# Patient Record
Sex: FEMALE | Employment: UNEMPLOYED | URBAN - METROPOLITAN AREA
[De-identification: names, ages, dates, MRNs, and addresses within clinical notes are randomized per-mention and may not be internally consistent; named-entity substitution may affect disease eponyms.]

---

## 2021-04-01 PROBLEM — F32.9 MAJOR DEPRESSION, SINGLE EPISODE: Status: ACTIVE | Noted: 2021-04-01

## 2021-04-02 ENCOUNTER — HOSPITAL ENCOUNTER (INPATIENT)
Age: 61
LOS: 7 days | Discharge: HOME | DRG: 885 | End: 2021-04-09
Attending: PSYCHIATRY & NEUROLOGY | Admitting: PSYCHIATRY & NEUROLOGY
Payer: COMMERCIAL

## 2021-04-02 PROBLEM — F33.3 SEVERE RECURRENT MAJOR DEPRESSION WITH PSYCHOTIC FEATURES (HCC): Status: ACTIVE | Noted: 2021-04-02

## 2021-04-02 PROCEDURE — 99223 1ST HOSP IP/OBS HIGH 75: CPT | Performed by: PSYCHIATRY & NEUROLOGY

## 2021-04-02 PROCEDURE — 6370000000 HC RX 637 (ALT 250 FOR IP): Performed by: PSYCHIATRY & NEUROLOGY

## 2021-04-02 PROCEDURE — 1240000000 HC EMOTIONAL WELLNESS R&B

## 2021-04-02 RX ORDER — OMEPRAZOLE 20 MG/1
20 CAPSULE, DELAYED RELEASE ORAL DAILY
Status: DISCONTINUED | OUTPATIENT
Start: 2021-04-02 | End: 2021-04-09 | Stop reason: HOSPADM

## 2021-04-02 RX ORDER — TRAZODONE HYDROCHLORIDE 50 MG/1
50 TABLET ORAL NIGHTLY PRN
Status: DISCONTINUED | OUTPATIENT
Start: 2021-04-02 | End: 2021-04-09 | Stop reason: HOSPADM

## 2021-04-02 RX ORDER — POLYETHYLENE GLYCOL 3350 17 G/17G
17 POWDER, FOR SOLUTION ORAL DAILY PRN
Status: DISCONTINUED | OUTPATIENT
Start: 2021-04-02 | End: 2021-04-09 | Stop reason: HOSPADM

## 2021-04-02 RX ORDER — CLONIDINE HYDROCHLORIDE 0.1 MG/1
0.1 TABLET ORAL NIGHTLY
Status: DISCONTINUED | OUTPATIENT
Start: 2021-04-02 | End: 2021-04-09 | Stop reason: HOSPADM

## 2021-04-02 RX ORDER — MAGNESIUM HYDROXIDE/ALUMINUM HYDROXICE/SIMETHICONE 120; 1200; 1200 MG/30ML; MG/30ML; MG/30ML
30 SUSPENSION ORAL EVERY 6 HOURS PRN
Status: DISCONTINUED | OUTPATIENT
Start: 2021-04-02 | End: 2021-04-09 | Stop reason: HOSPADM

## 2021-04-02 RX ORDER — BUSPIRONE HYDROCHLORIDE 15 MG/1
15 TABLET ORAL 2 TIMES DAILY
Status: DISCONTINUED | OUTPATIENT
Start: 2021-04-02 | End: 2021-04-09 | Stop reason: HOSPADM

## 2021-04-02 RX ORDER — ATORVASTATIN CALCIUM 20 MG/1
20 TABLET, FILM COATED ORAL DAILY
Status: DISCONTINUED | OUTPATIENT
Start: 2021-04-02 | End: 2021-04-09 | Stop reason: HOSPADM

## 2021-04-02 RX ORDER — HYDROXYZINE 50 MG/1
50 TABLET, FILM COATED ORAL 3 TIMES DAILY PRN
Status: DISCONTINUED | OUTPATIENT
Start: 2021-04-02 | End: 2021-04-09 | Stop reason: HOSPADM

## 2021-04-02 RX ORDER — OMEPRAZOLE 10 MG/1
20 CAPSULE, DELAYED RELEASE ORAL DAILY
Status: ON HOLD | COMMUNITY
End: 2021-04-08 | Stop reason: HOSPADM

## 2021-04-02 RX ORDER — ACETAMINOPHEN 325 MG/1
650 TABLET ORAL EVERY 4 HOURS PRN
Status: DISCONTINUED | OUTPATIENT
Start: 2021-04-02 | End: 2021-04-09 | Stop reason: HOSPADM

## 2021-04-02 RX ORDER — LITHIUM CARBONATE 300 MG/1
300 TABLET, FILM COATED, EXTENDED RELEASE ORAL DAILY
Status: DISCONTINUED | OUTPATIENT
Start: 2021-04-02 | End: 2021-04-09 | Stop reason: HOSPADM

## 2021-04-02 RX ORDER — BUSPIRONE HYDROCHLORIDE 15 MG/1
15 TABLET ORAL 2 TIMES DAILY
Status: ON HOLD | COMMUNITY
Start: 2021-03-05 | End: 2021-04-08 | Stop reason: SDUPTHER

## 2021-04-02 RX ORDER — SERTRALINE HYDROCHLORIDE 100 MG/1
150 TABLET, FILM COATED ORAL DAILY
Status: ON HOLD | COMMUNITY
Start: 2020-11-27 | End: 2021-04-08 | Stop reason: HOSPADM

## 2021-04-02 RX ORDER — ATORVASTATIN CALCIUM 10 MG/1
20 TABLET, FILM COATED ORAL DAILY
Status: ON HOLD | COMMUNITY
End: 2021-04-08 | Stop reason: SDUPTHER

## 2021-04-02 RX ORDER — IBUPROFEN 400 MG/1
400 TABLET ORAL EVERY 6 HOURS PRN
Status: DISCONTINUED | OUTPATIENT
Start: 2021-04-02 | End: 2021-04-09 | Stop reason: HOSPADM

## 2021-04-02 RX ORDER — ZOLPIDEM TARTRATE 10 MG/1
10 TABLET ORAL NIGHTLY PRN
Status: ON HOLD | COMMUNITY
End: 2021-04-02

## 2021-04-02 RX ADMIN — IBUPROFEN 400 MG: 400 TABLET, FILM COATED ORAL at 21:00

## 2021-04-02 RX ADMIN — BUSPIRONE HYDROCHLORIDE 15 MG: 15 TABLET ORAL at 20:58

## 2021-04-02 RX ADMIN — OMEPRAZOLE 20 MG: 20 CAPSULE, DELAYED RELEASE ORAL at 16:32

## 2021-04-02 RX ADMIN — LITHIUM CARBONATE 300 MG: 300 TABLET, FILM COATED, EXTENDED RELEASE ORAL at 18:33

## 2021-04-02 RX ADMIN — HYDROXYZINE HYDROCHLORIDE 50 MG: 50 TABLET, FILM COATED ORAL at 01:51

## 2021-04-02 RX ADMIN — CLONIDINE HYDROCHLORIDE 0.1 MG: 0.1 TABLET ORAL at 20:59

## 2021-04-02 RX ADMIN — HYDROXYZINE HYDROCHLORIDE 50 MG: 50 TABLET, FILM COATED ORAL at 21:00

## 2021-04-02 RX ADMIN — ATORVASTATIN CALCIUM 20 MG: 20 TABLET, FILM COATED ORAL at 16:32

## 2021-04-02 RX ADMIN — SERTRALINE HYDROCHLORIDE 150 MG: 100 TABLET ORAL at 16:32

## 2021-04-02 ASSESSMENT — SLEEP AND FATIGUE QUESTIONNAIRES
DIFFICULTY ARISING: NO
RESTFUL SLEEP: NO
DIFFICULTY STAYING ASLEEP: YES
RESTFUL SLEEP: NO
DO YOU USE A SLEEP AID: NO
AVERAGE NUMBER OF SLEEP HOURS: 6
DIFFICULTY ARISING: NO
DIFFICULTY STAYING ASLEEP: NO
SLEEP PATTERN: RESTLESSNESS
DIFFICULTY FALLING ASLEEP: YES
DO YOU HAVE DIFFICULTY SLEEPING: NO

## 2021-04-02 ASSESSMENT — PATIENT HEALTH QUESTIONNAIRE - PHQ9: SUM OF ALL RESPONSES TO PHQ QUESTIONS 1-9: 14

## 2021-04-02 NOTE — PROGRESS NOTES
Pharmacy Medication History Note      List of current medications patient is taking is complete. Source of information: [facility] [phone]    Changes made to medication list:  Medications removed (include reason, ex. therapy complete or physician discontinued, noncompliance):  Zolpidem (Ambien)- list clean up    Medications added/doses adjusted:  Atorvastatin (Lipitor) 20 mg daily  Omeprazole (Prilosec) 20 mg daily    Please let me know if you have any questions about this encounter. Thank you!     Electronically signed by Rupesh Navarrete on 4/2/2021 at 10:57 AM

## 2021-04-02 NOTE — PLAN OF CARE
Problem: Altered Mood, Depressive Behavior:  Goal: Ability to disclose and discuss suicidal ideas will improve  Description: Ability to disclose and discuss suicidal ideas will improve  Outcome: Ongoing  Note: Patient denies suicidal ideation at this time. Problem: Altered Mood, Depressive Behavior:  Goal: Absence of self-harm  Description: Absence of self-harm  Outcome: Ongoing  Note: Patient remains free from self-harm.

## 2021-04-02 NOTE — GROUP NOTE
Group Therapy Note    Date: 4/2/2021    Group Start Time: 1330  Group End Time: 1410  Group Topic: Psychoeducation    MARCOS Roberson, DANGELOS    Group Therapy Note    Attendees: 9    Patient's Goal:  Pt will demonstrate improved interpersonal skills    Notes:  Pt attended group and participated. Status After Intervention:  Unchanged    Participation Level:  Active Listener    Participation Quality: Appropriate, Attentive and Resistant      Speech:  normal      Thought Process/Content: Logical      Affective Functioning: Constricted/Restricted      Mood: anxious      Level of consciousness:  Alert and Inattentive      Response to Learning: Able to verbalize current knowledge/experience and Resistant      Endings: None Reported    Modes of Intervention: Education, Support, Socialization, Exploration and Activity      Discipline Responsible: Psychoeducational Specialist      Signature:  Mackenzie Arita

## 2021-04-02 NOTE — PLAN OF CARE
[facility]  Initial Interdisciplinary Treatment Plan NO      Original treatment plan Date & Time: 4/2/21    Admission Type:  Admission Type: Involuntary    Reason for admission:   Reason for Admission: Increased life stressors, Suicidal ideations related to financial problems with her . Estimated Length of Stay:  5-7days  Estimated Discharge Date: to be determined by physician    PATIENT STRENGTHS:  Patient Strengths:Strengths: Communication, Connection to output provider  Patient Strengths and Limitations:Limitations: Difficulty problem solving/relies on others to help solve problems, Hopeless about future  Addictive Behavior: Addictive Behavior  In the past 3 months, have you felt or has someone told you that you have a problem with:  : None  Do you have a history of Chemical Use?: No  Do you have a history of Alcohol Use?: No  Do you have a history of Street Drug Abuse?: No  Histroy of Prescripton Drug Abuse?: No  Medical Problems:History reviewed. No pertinent past medical history.   Status EXAM:Status and Exam  Normal: No  Facial Expression: Sad  Affect: Appropriate  Level of Consciousness: Alert  Mood:Normal: No  Mood: Depressed, Anxious, Sad, Terrified  Motor Activity:Normal: Yes  Interview Behavior: Cooperative  Preception: [place] to Person, Alanda Marten to Time, [place] to Place, [place] to Situation  Attention:Normal: No  Attention: Distractible, Unable to Concentrate  Thought Processes: Other(See comment)(WDL)  Thought Content:Normal: Yes  Hallucinations: None  Delusions: No  Memory:Normal: Yes  Insight and Judgment: No  Insight and Judgment: Poor Judgment  Present Suicidal Ideation: No  Present Homicidal Ideation: No    EDUCATION:   Learner Progress Toward Treatment Goals: reviewed group plans and strategies for care    Method:group therapy, medication compliance, individualized assessments and care planning    Outcome: needs reinforcement    PATIENT GOALS: to be discussed with patient within 72 hours    PLAN/TREATMENT RECOMMENDATIONS:     continue group therapy , medications compliance, goal setting, individualized assessments and care, continue to monitor pt on unit      SHORT-TERM GOALS:   Time frame for Short-Term Goals: 5-7 days    LONG-TERM GOALS:  Time frame for Long-Term Goals: 6 months  Members Present in Team Meeting: [facility]

## 2021-04-02 NOTE — BH NOTE
`[facility]  Admission Note     Admission Type:   Admission Type: Involuntary    Reason for admission:  Reason for Admission: Increased life stressors, Suicidal ideations related to financial problems with her . PATIENT STRENGTHS:  Strengths: Communication, Connection to output provider    Patient Strengths and Limitations:  Limitations: Difficulty problem solving/relies on others to help solve problems, Hopeless about future    Addictive Behavior:   Addictive Behavior  In the past 3 months, have you felt or has someone told you that you have a problem with:  : None  Do you have a history of Chemical Use?: No  Do you have a history of Alcohol Use?: No  Do you have a history of Street Drug Abuse?: No  Histroy of Prescripton Drug Abuse?: No    Medical Problems:   History reviewed. No pertinent past medical history.     Status EXAM:  Status and Exam  Normal: No  Facial Expression: Sad  Affect: Appropriate  Level of Consciousness: Alert  Mood:Normal: No  Mood: Depressed, Anxious, Sad, Terrified  Motor Activity:Normal: Yes  Interview Behavior: Cooperative  Preception: [place] to Person, Karen Putty to Time, [place] to Place, [place] to Situation  Attention:Normal: No  Attention: Distractible, Unable to Concentrate  Thought Processes: Other(See comment)(WDL)  Thought Content:Normal: Yes  Hallucinations: None  Delusions: No  Memory:Normal: Yes  Insight and Judgment: No  Insight and Judgment: Poor Judgment  Present Suicidal Ideation: No  Present Homicidal Ideation: No    Tobacco Screening:  Practical Counseling, on admission, cale X, if applicable and completed (first 3 are required if patient doesn't refuse):            ( )  Recognizing danger situations (included triggers and roadblocks)                    ( )  Coping skills (new ways to manage stress, exercise, relaxation techniques, changing routine, distraction)                                                           ( )  Basic information about quitting (benefits of quitting, techniques in how to quit, available resources  ( ) Referral for counseling faxed to Jessica                                           ( x) Patient refused counseling  ( ) Patient has not smoked in the last 30 days    Metabolic Screening:    No results found for: LABA1C    No results found for: CHOL  No results found for: TRIG  No results found for: HDL  No components found for: LDLCAL  No results found for: LABVLDL      Body mass index is 28.08 kg/m². BP Readings from Last 2 Encounters:   21 115/74           Pt admitted with followings belongings:  Dentures: None  Vision - Corrective Lenses: Glasses  Hearing Aid: None  Jewelry: None  Body Piercings Removed: N/A  Clothing: Footwear, Pants, Shirt, Socks, Undergarments (Comment), Pajamas  Were All Patient Medications Collected?: Not Applicable  Other Valuables: None     Valuables placed in safe in security envelope, number:  N/a. Patient's home medications need verified. Patient oriented to surroundings and program expectations and copy of patient rights given. Received admission packet:  no.  Consents reviewed, signed no. Refused yes. Patient verbalize understanding:  yes. Patient education on precautions: yes      Pt admitted from [address] for suicidal thoughts. Pt states she is really depressed and stressed over financial issues. Pt's  lost his job. Pt states her dog  recently as well. Pt denies drugs or alcohol use. Pt wanded and changed into hospital attire for safety. Pt sees Olya Barr out patient for therapy and Rosalina Anthony prescribes her medications.                    Martita Finn RN

## 2021-04-02 NOTE — CARE COORDINATION
BHI Biopsychosocial Assessment    Current Level of Psychosocial Functioning     Independent x  Dependent    Minimal Assist     Comments:    Psychosocial High Risk Factors (check all that apply)    Unable to obtain meds   Chronic illness/pain    Substance abuse   Lack of Family Support   Financial stress xx  Isolation xx  Inadequate Community Resources  Suicide attempt(s)  Not taking medications   Victim of crime   Developmental Delay  Unable to manage personal needs    Age 74 or older   Homeless  No transportation   Readmission within 30 days  Unemployment  Traumatic Event    Comments:   Psychiatric Advanced Directives: pt denies     Family to Involve in Treatment: pt reports her  and her adult children are supportive     Sexual Orientation:  N/A    Patient Strengths: pt has stable housing, supportive family, is linked with outpatient provider     Patient Barriers: pt reports her  lost long-term job 1 year ago and since the loss, the family has been struggling financially       Opiate Education Provided:  Pt denies       [facility]/mental health history: Pt is linked with Jorge [facility]as  in [place], [place]; Maria Eugenia John NP for medications     Plan of Care   medication management, group/individual therapies, family meetings, psycho -education, treatment team meetings to assist with stabilization    Initial Discharge Plan:  Pt to return home at discharge and f/up with Bella Garcia in [address] Summary:  Danette Winkler is a 64year old single female who has been admitted to [place] [address] after being brought to emergency department by her  and adult daughter with goal of mental health support as patient reports she has been having increased anxiety and suicidal ideation over the last 2 weeks. Pt reports history of outpatient mental health treatment with psychiatrist and therapist for treatment of anxiety disorder, denies history of inpatient admission.  Pt reports increase in excessive and constant worry, castastrophizing, feelings of panic. Pt reports disrupted sleep, feelings of restlessness and hopelessness. Pt reports history of anxiety since her teens, states increase in the last 1 year due to loss of job by  and financial struggle and application for bankruptcy. Pt reports her  of 36 years and 4 adult children are supportive. Pt reports she has attempted to work outside the home but is not able due to chronic feelings of panic and worry, pt reports she has applied for social security disability income but has been denied. Pt denies history of suicide attempts. Pt denies legal issues. Pt denies AOD history/issues. SW offered instruction on mindfulness techniques for coping/relaxation. Pt did attend psychotherapy group following assessment and participated appropriately. SW offered ongoing support, encouragement.

## 2021-04-02 NOTE — BH NOTE
Patient given tobacco quitline number 07832865015 at this time, refusing to call at this time, states \" I just dont want to quit now\"- patient given information as to the dangers of long term tobacco use. Continue to reinforce the importance of tobacco cessation.

## 2021-04-02 NOTE — H&P
Department of Psychiatry  Attending Physician Psychiatric Assessment     Reason for Admission to Psychiatric Unit:  Threat to self requiring 24 hour professional observation  Concerns about patient's safety in the community    CHIEF COMPLAINT:  Depression with suicidal ideation    History obtained from:  Patient and electronic medical record     HISTORY OF PRESENT ILLNESS:    Junito Pavon is a 64 y.o. female with significant past medical history of GERD and hyperlipidemia who was brought to the ER but her  and adult daughter for suicidal thoughts. Patient states she had a plan to stab herself with a knife but told her daughter. Patient reports that there has been a significant amount of stressors that have led to this including filling bankruptcy and \"having to be responsible. \"   Patient reports that she has been feeling down and depressed all day, everyday for a period of time longer than 2 weeks. She reports poor sleep despite the use of Ambien. She states she sleeps with a CPAP machine. She endorses significant anhedonia, decreased concentration, decreased energy, and feelings of hopeless and helplessness. She reports that her appetite is poor. She endorses feelings of guilt that she lacks so much energy and states that she just feels \"weak. \"  Patient has chronic passive suicidal thoughts. She states, \"I'm angry with God because he won't just let me die naturally. \" She states that she wants to die but she is \"too scared\" to do anything. Patient denies a time in her past where she has had grandiose thoughts of herself, gone 5 days or more without sleep, and made impulsive decisions. Patient endorses auditory hallucinations of doors opening and closing and of music but states this only happens when she is feeling down and depressed. She also reports she sometimes sees shadows when she is down and depressed. She endorses anxiety about anything and everything.   She reports she is often on edge and Wt 174 lb (78.9 kg)   BMI 28.08 kg/m²      Review of Systems   Constitutional: Negative for chills and weight loss. HENT: Negative for ear pain and nosebleeds. Eyes: Negative for blurred vision and photophobia. Respiratory: Negative for cough, shortness of breath and wheezing. Cardiovascular: Negative for chest pain and palpitations. Gastrointestinal: Negative for abdominal pain, diarrhea and vomiting. Genitourinary: Negative for dysuria and urgency. Musculoskeletal: Negative for falls and joint pain. Skin: Negative for itching and rash. Neurological: Negative for tremors, seizures and weakness. Endo/Heme/Allergies: Does not bruise/bleed easily. Physical Exam:      Constitutional:  Appears well-developed and well-nourished, no acute distress  HENT:   Head: Normocephalic and atraumatic. Eyes: Conjunctivae are normal. Right eye exhibits no discharge. Left eye exhibits no discharge. No scleral icterus. Neck: Normal range of motion. Neck supple. Pulmonary/Chest:  No respiratory distress or accessory muscle use, no wheezing. Abdominal: Soft. Exhibits no distension. Musculoskeletal: Normal range of motion. Exhibits no edema. Neurological: cranial nerves II-XII grossly in tact, normal gait and station  Skin: Skin is warm and dry. Patient is not diaphoretic. No erythema.          Mental Status Examination:    Level of consciousness:  within normal limits   Appearance:  Hospital attire, seated on the side of bed, fair grooming   Behavior/Motor: Tremerous  Attitude toward examiner:  Cooperative  Speech: normal rate and volume  Mood:  Depressed, anxious  Affect:  Mood-congruent, tearful  Thought processes:  Goal directed, linear  Thought content: active suicidal ideations without current plan or intent               Denies homicidal ideations               Endorses mood-congruent hallucinations              denies delusions  Cognition:  Oriented to self, location, time,
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
## 2021-04-02 NOTE — GROUP NOTE
Group Therapy Note    Date: 4/2/2021    Group Start Time: 1000  Group End Time: 3946  Group Topic: Psychotherapy    [facility] [address] DIANE Russo        Group Therapy Note    Attendees: 3/10         Patient's Goal:  Expression of feeling     Notes:  Therapeutic worksheet provided     Status After Intervention:  Unchanged    Participation Level:  Active Listener and Interactive    Participation Quality: Appropriate and Attentive      Speech:  normal      Thought Process/Content: Logical      Affective Functioning: Congruent      Mood: anxious      Level of consciousness:  Alert and Oriented x4      Response to Learning: Able to verbalize current knowledge/experience and Able to verbalize/acknowledge new learning      Endings: None Reported    Modes of Intervention: Education and Support      Discipline Responsible: /Counselor      Signature:  DIANE Moon

## 2021-04-02 NOTE — GROUP NOTE
Group Therapy Note    Date: 4/2/2021    Group Start Time: 1600  Group End Time: 36  Group Topic: Healthy Living/Wellness    MARCOS BHIDALMIS CARRASCO    Aneta Mackay RN        Group Therapy Note    Attendees: 10/18         Patient's Goal:   Increase intrapersonal skills, develop new coping skills    Notes:  Progressing to goal.    Status After Intervention:  Improved    Participation Level:  Active Listener    Participation Quality: Appropriate and Resistant      Speech:  normal      Thought Process/Content: Logical      Affective Functioning: Congruent      Mood: calm      Level of consciousness:  Alert and Oriented x4      Response to Learning: Progressing to goal      Endings: None Reported    Modes of Intervention: Education, Support, Socialization, Exploration and Problem-solving      Discipline Responsible: Registered Nurse      Signature:  Aneta Mackay RN

## 2021-04-03 PROCEDURE — 99232 SBSQ HOSP IP/OBS MODERATE 35: CPT | Performed by: NURSE PRACTITIONER

## 2021-04-03 PROCEDURE — 6370000000 HC RX 637 (ALT 250 FOR IP): Performed by: PSYCHIATRY & NEUROLOGY

## 2021-04-03 PROCEDURE — 1240000000 HC EMOTIONAL WELLNESS R&B

## 2021-04-03 RX ADMIN — OMEPRAZOLE 20 MG: 20 CAPSULE, DELAYED RELEASE ORAL at 12:02

## 2021-04-03 RX ADMIN — HYDROXYZINE HYDROCHLORIDE 50 MG: 50 TABLET, FILM COATED ORAL at 21:35

## 2021-04-03 RX ADMIN — BUSPIRONE HYDROCHLORIDE 15 MG: 15 TABLET ORAL at 12:02

## 2021-04-03 RX ADMIN — SERTRALINE HYDROCHLORIDE 150 MG: 100 TABLET ORAL at 12:02

## 2021-04-03 RX ADMIN — ATORVASTATIN CALCIUM 20 MG: 20 TABLET, FILM COATED ORAL at 12:02

## 2021-04-03 RX ADMIN — TRAZODONE HYDROCHLORIDE 50 MG: 50 TABLET ORAL at 21:35

## 2021-04-03 RX ADMIN — LITHIUM CARBONATE 300 MG: 300 TABLET, FILM COATED, EXTENDED RELEASE ORAL at 12:02

## 2021-04-03 RX ADMIN — BUSPIRONE HYDROCHLORIDE 15 MG: 15 TABLET ORAL at 21:35

## 2021-04-03 NOTE — GROUP NOTE
Group Therapy Note    Date: 4/3/2021    Group Start Time: 1100  Group End Time: 1130  Group Topic: Healthy Living/Wellness    [address], RN        Group Therapy Note    Attendees: 11/19         Patient refused to attend Wellness group at 1100 after encouragement from staff. 1:1 talk time offered as alternative to group session.       Signature:  Gaby Mcintyre RN

## 2021-04-03 NOTE — GROUP NOTE
Group Therapy Note    Date: 4/3/2021    Group Start Time: 1330  Group End Time: 2867  Group Topic: Cognitive Skills    MARCOS Jose    Patient declined to attend social skills group at 1330 despite encouragement from staff. 1:1 talk time offered by staff as alternative to group session.         Signature:  Becca Jose

## 2021-04-03 NOTE — PLAN OF CARE
[facility]  Day 3 Interdisciplinary Treatment Plan NOTE    Review Date & Time: 4/3/2021 1313    Admission Type:   Admission Type: Involuntary    Reason for admission:  Reason for Admission: Increased life stressors, Suicidal ideations related to financial problems with her . Estimated Length of Stay: 5-7 days  Estimated Discharge Date Update: to be determined by physician    PATIENT STRENGTHS:  Patient Strengths Strengths: Communication, Positive Support  Patient Strengths and Limitations:Limitations: Tendency to isolate self, Multiple barriers to leisure interests, General negative or hopeless attitude about future/recovery  Addictive Behavior:Addictive Behavior  In the past 3 months, have you felt or has someone told you that you have a problem with:  : None  Do you have a history of Chemical Use?: No  Do you have a history of Alcohol Use?: No  Do you have a history of Street Drug Abuse?: No  Histroy of Prescripton Drug Abuse?: No  Medical Problems:History reviewed. No pertinent past medical history.     Risk:  Fall RiskTotal: 55  Jeovn Scale Jevon Scale Score: 22  BVC Total: 0  Change in scores no Changes to plan of Care no    Status EXAM:   Status and Exam  Normal: No  Facial Expression: Flat, Sad  Affect: Appropriate  Level of Consciousness: Alert  Mood:Normal: No  Mood: Depressed, Sad  Motor Activity:Normal: No  Motor Activity: Decreased  Interview Behavior: Cooperative  Preception: [place] to Person, [place]abeline Keyes to Time, [place] to Place, [place] to Situation  Attention:Normal: Yes  Attention: Distractible  Thought Processes: Circumstantial  Thought Content:Normal: Yes  Hallucinations: None  Delusions: No  Memory:Normal: Yes  Insight and Judgment: Yes  Insight and Judgment: Poor Judgment  Present Suicidal Ideation: No  Present Homicidal Ideation: No    Daily Assessment Last Entry:   Daily Sleep (WDL): Within Defined Limits         Patient Currently in Pain: Denies  Daily Nutrition (WDL): Signature Sheet    Anjel Bachelor

## 2021-04-03 NOTE — GROUP NOTE
Group Therapy Note    Date: 4/3/2021    Group Start Time: 0900  Group End Time: 0920  Group Topic: Community Meeting    [address] [facility] [address]    Patient declined to attend community meeting/goal setting group at 0900 despite encouragement from staff. 1:1 talk time offered by staff as alternative to group session.       Signature:  Andrew Conteh

## 2021-04-03 NOTE — PLAN OF CARE
Problem: Altered Mood, Depressive Behavior:  Goal: Ability to disclose and discuss suicidal ideas will improve  Description: Ability to disclose and discuss suicidal ideas will improve  4/3/2021 0534 by Cora Hagan LPN  Outcome: Ongoing  Note: Patient denies suicidal ideation at this time. Problem: Altered Mood, Depressive Behavior:  Goal: Absence of self-harm  Description: Absence of self-harm  4/3/2021 0534 by Cora Hagan LPN  Outcome: Ongoing  Patient remains free from self harm. Patient agrees to seek staff if thoughts arise. 15 minute checks maintained for patient safety. Will continue to monitor and provide support and reassurance as needed.

## 2021-04-03 NOTE — GROUP NOTE
Group Therapy Note    Date: 4/3/2021    Group Start Time: 1000  Group End Time: 1050  Group Topic: Psychoeducation    MARCOS CARRASCO    DOTTIE Bhatia LSW        Group Therapy Note    Attendees: 9         Patient's Goal:  Patient will demonstrate increased interpersonal communication skills. Notes:  Patient participated appropriately in 10 am Psychoeducational group. Group topic was Boundaries setting. Status After Intervention:  Improved    Participation Level:  Active Listener    Participation Quality: Appropriate      Speech:  normal      Thought Process/Content: Logical      Affective Functioning: Congruent      Mood: euthymic      Level of consciousness:  Alert and Oriented x4      Response to Learning: Able to verbalize current knowledge/experience and Able to verbalize/acknowledge new learning      Endings: None Reported    Modes of Intervention: Education, Socialization and Problem-solving      Discipline Responsible: /Counselor      Signature:  DOTTIE Bhatia LSW

## 2021-04-03 NOTE — PROGRESS NOTES
Daily Progress Note  Humza Antunez, CNP  4/3/2021      CHIEF COMPLAINT: Depression with suicidal ideation    Reviewed patient's current plan of care and vital signs with nursing staff. Sleep:  a few hours last night  Attending groups: No, isolating to room    SUBJECTIVE:    Staff reports the patient remains medication compliant, they report that she remains mostly isolative to her room. She is using as needed Atarax on occasion for anxiety. She did not take trazodone last night to help with sleep, she reports that the clonidine was somewhat helpful in letting her obtain \"deeper\" sleep but she continues to endorse early awakening. We did discuss that she could try the trazodone tonight to see if it helps lengthen the amount of sleep she receives. She reports that she only had one nightmare that she recalls and denies any negative side effects from the clonidine which is new to her medication regimen. She continues to endorse significant depression, she is quite tearful throughout our entire interview. She reports that she feels guilty and feels like she is a burden on her family. She states that she is frustrated because she has \"no reason to feel the way that I do\". She states that she tries to enjoy life with her family and her grandchildren who she describes as \"wonderful\" but reports that she is still so sad and down. She endorses significant lack of motivation, she reports that she wants to work so that she can help out financially but she is just unable to do it. She states that she is scared because she has never, \"this close\" to wanting to kill herself. She states that she has often had thoughts that life is not worth living and had hoped that she would naturally die but describes this as \"really scary\". She reports frustration that medications have not worked, she states that the best she ever felt on medication was Wellbutrin and she had an allergic reaction to it.   She reports that she has had a

## 2021-04-03 NOTE — PLAN OF CARE
Problem: Altered Mood, Depressive Behavior:  Goal: Absence of self-harm  Description: Absence of self-harm  4/3/2021 1115 by Selwyn Nazario LPN  Outcome: Ongoing   Patient remains free from self harm.     Problem: Altered Mood, Depressive Behavior:  Goal: Ability to disclose and discuss suicidal ideas will improve  Description: Ability to disclose and discuss suicidal ideas will improve  4/3/2021 1115 by Selwyn Nazario LPN  Outcome: Ongoing   Patient denies suicidal ideations

## 2021-04-04 PROCEDURE — 6370000000 HC RX 637 (ALT 250 FOR IP): Performed by: PSYCHIATRY & NEUROLOGY

## 2021-04-04 PROCEDURE — 1240000000 HC EMOTIONAL WELLNESS R&B

## 2021-04-04 PROCEDURE — 99232 SBSQ HOSP IP/OBS MODERATE 35: CPT | Performed by: NURSE PRACTITIONER

## 2021-04-04 RX ADMIN — OMEPRAZOLE 20 MG: 20 CAPSULE, DELAYED RELEASE ORAL at 08:02

## 2021-04-04 RX ADMIN — BUSPIRONE HYDROCHLORIDE 15 MG: 15 TABLET ORAL at 20:46

## 2021-04-04 RX ADMIN — LITHIUM CARBONATE 300 MG: 300 TABLET, FILM COATED, EXTENDED RELEASE ORAL at 08:02

## 2021-04-04 RX ADMIN — ATORVASTATIN CALCIUM 20 MG: 20 TABLET, FILM COATED ORAL at 08:02

## 2021-04-04 RX ADMIN — HYDROXYZINE HYDROCHLORIDE 50 MG: 50 TABLET, FILM COATED ORAL at 20:46

## 2021-04-04 RX ADMIN — TRAZODONE HYDROCHLORIDE 50 MG: 50 TABLET ORAL at 20:46

## 2021-04-04 RX ADMIN — CLONIDINE HYDROCHLORIDE 0.1 MG: 0.1 TABLET ORAL at 20:46

## 2021-04-04 RX ADMIN — BUSPIRONE HYDROCHLORIDE 15 MG: 15 TABLET ORAL at 08:02

## 2021-04-04 RX ADMIN — SERTRALINE HYDROCHLORIDE 150 MG: 100 TABLET ORAL at 08:02

## 2021-04-04 NOTE — PROGRESS NOTES
Daily Progress Note  Lonnie Rey CNP  4/4/2021      CHIEF COMPLAINT: Depression with suicidal ideation    Reviewed patient's current plan of care and vital signs with nursing staff. Sleep: Slept better, continues to have early awakening  Attending groups: Yes, selectively    SUBJECTIVE:    Staff reports medication compliance, no overnight events. Rodríguez Modi was social with peers today and active in rec therapy this afternoon. She reports that she has been trying to come out of her room more to pass the time. She reports that she is \"forcing herself\" to engage to get her mind off of things. She remains depressed and continues to endorse a lot of tearfulness. She reports that she thought about our interview yesterday and states that she has in the past experienced dissociation where she feels \"outside of my body looking in\". She denies feeling that at present. She denies auditory or visual hallucinations. She continues to endorse suicidal ideation, she reports that she now wishes that God would take her more than having active intent or plan to harm herself. Rodríguez Modi reports that she spoke with her family last night regarding ECT, her family encouraged her to consider it. We did watch the video today and discussed at length the procedure itself, requirements for inpatient versus outpatient as well as potential side effects. She reports some concern that she would have to remain in the hospital for all treatments. We discussed that it is possible for her to transition to outpatient ECT once we have established enough stability of symptoms that she is safe and no longer suicidal.  She would like to engage in further discussion tomorrow with Dr. Brown Boards, we will move forward with consulting internal medicine for medical clearance in addition to prior authorization with her insurance. She remains somewhat reluctant though feels that she is at a point where she cannot control her depressive symptoms herself.   She repeats on multiple occasions \"I cannot believe I let things get this bad\". Agatha Schwartz is right-handed, she reports history of surgical procedure and denies any problems with anesthesia. Mental Status Exam  Level of consciousness:  Awake and alert  Appearance: Personal attire, seated on chair, with good grooming and hygiene   Behavior/Motor: Pleasant upon approach, less tearful today  Attitude toward examiner:  Cooperative, attentive, good eye contact  Speech:  spontaneous, normal rate, normal volume and well articulated  Mood: Depressed  Affect: Mood congruent, anxious-began shaking legs repeatedly while watching ECT video  Thought processes:  linear, goal directed and coherent  Thought content:  denies homicidal ideation  Suicidal Ideation: Endorses active suicidal ideation, verbally contracts for safety  Delusions:  no evidence of delusions  Perceptual Disturbance: Endorses improving auditory hallucinations  Cognition:  Oriented to person, place, time/date and situation  Memory: age appropriate  Insight & Judgement: Fair, improving  Medication side effects:  denies       Data   height is 5' 6\" (1.676 m) and weight is 174 lb (78.9 kg). Her oral temperature is 97.8 °F (36.6 °C). Her blood pressure is 92/68 and her pulse is 84. Her respiration is 14. Labs:   No results found for any previous visit.             Medications  Current Facility-Administered Medications: acetaminophen (TYLENOL) tablet 650 mg, 650 mg, Oral, Q4H PRN  aluminum & magnesium hydroxide-simethicone (MAALOX) 200-200-20 MG/5ML suspension 30 mL, 30 mL, Oral, Q6H PRN  hydrOXYzine (ATARAX) tablet 50 mg, 50 mg, Oral, TID PRN  ibuprofen (ADVIL;MOTRIN) tablet 400 mg, 400 mg, Oral, Q6H PRN  polyethylene glycol (GLYCOLAX) packet 17 g, 17 g, Oral, Daily PRN  traZODone (DESYREL) tablet 50 mg, 50 mg, Oral, Nightly PRN  nicotine polacrilex (NICORETTE) gum 2 mg, 2 mg, Oral, PRN  atorvastatin (LIPITOR) tablet 20 mg, 20 mg, Oral, Daily  busPIRone (BUSPAR) tablet 15 mg, 15 mg, Oral, BID  omeprazole (PRILOSEC) delayed release capsule 20 mg, 20 mg, Oral, Daily  sertraline (ZOLOFT) tablet 150 mg, 150 mg, Oral, Daily  cloNIDine (CATAPRES) tablet 0.1 mg, 0.1 mg, Oral, Nightly  lithium (LITHOBID) extended release tablet 300 mg, 300 mg, Oral, Daily    ASSESSMENT  Severe recurrent major depression with psychotic features (HCC)     PLAN  Patients symptoms show some improvement  Continue current medication regimen  Monitor need and frequency of as needed medication  Encourage participation in groups and milieu  Will prior auth for ECT, discussed with Dr. Lesly Ford and likely have a family meeting prior to moving forward with treatment  Consult internal medicine for medical clearance for ECT  Attempt to develop insight  Psycho-education conducted. Supportive Therapy conducted. Probable discharge is undetermined at this time  Follow-up daily while in the inpatient unit    Electronically signed by BLADIMIR Christensen CNP on 4/3/21 at 4:11 PM EDT    **This report has been created using voice recognition software. It may contain minor errors which are inherent in voice recognition technology. **

## 2021-04-04 NOTE — PLAN OF CARE
Problem: Altered Mood, Depressive Behavior:  Goal: Absence of self-harm  Description: Absence of self-harm  4/4/2021 0938 by Michelle Olmos LPN  Outcome: Ongoing   Patient remains free from self harm. Problem: Altered Mood, Depressive Behavior:  Goal: Ability to disclose and discuss suicidal ideas will improve  Description: Ability to disclose and discuss suicidal ideas will improve  4/4/2021 0938 by Michelle Olmos LPN  Outcome: Ongoing    Patient denies suicidal ideations.

## 2021-04-04 NOTE — PLAN OF CARE
Problem: Altered Mood, Depressive Behavior:  Goal: Absence of self-harm  Description: Absence of self-harm  4/3/2021 2136 by Alvaro Weaver LPN  Outcome: Ongoing     Problem: Altered Mood, Depressive Behavior:  Goal: Ability to disclose and discuss suicidal ideas will improve  Description: Ability to disclose and discuss suicidal ideas will improve  4/3/2021 2136 by Alvaro Weaver LPN  Outcome: Ongoing     Patient denies all thoughts or ideations to harm self. Patient states her anxiety and depression is a 4/10 but is improving. Patient is medication and behavorial compliant. Patient is provided with safe environment. Patient encouraged to seek nursing staff if thoughts of self harm arise, patient verbalized understanding. Will continue to monitor Q15 minute safety checks.

## 2021-04-04 NOTE — GROUP NOTE
Group Therapy Note    Date: 4/4/2021    Group Start Time: 1000  Group End Time: 1100  Group Topic: Psychoeducation    MAROCS CARRASCO    DOTTIE Pierson, DIANE        Group Therapy Note    Attendees: 12         Patient's Goal:  Pt will demonstrate increased interpersonal interaction. Notes:  Group topic was Strengths.      Status After Intervention:  Improved    Participation Level: Interactive    Participation Quality: Appropriate      Speech:  normal      Thought Process/Content: Logical      Affective Functioning: Congruent      Mood: anxious      Level of consciousness:  Alert      Response to Learning: Progressing to goal      Endings: None Reported    Modes of Intervention: Education      Discipline Responsible: /Counselor      Signature:  DOTTIE Pierson, DIANE

## 2021-04-04 NOTE — GROUP NOTE
Group Therapy Note    Date: 4/4/2021    Group Start Time: 1600  Group End Time: 5355  Group Topic: Healthy Living/Wellness    [address], RN        Group Therapy Note    Attendees: 11/19           Patient refused to attend Wellness group at 1600 after encouragement from staff. 1:1 talk time offered as alternative to group session.       Signature:  Kandice Paz RN

## 2021-04-04 NOTE — GROUP NOTE
Group Therapy Note    Date: 4/4/2021    Group Start Time: 0900  Group End Time: 1740  Group Topic: Community Meeting    [facility]    [place], [place]        Group Therapy Note    Attendees: 13/21         Patient's Goal:  To increase interpersonal interaction. Notes:  Pt attended and participated in group. Status After Intervention:  Improved    Participation Level:  Active Listener and Interactive    Participation Quality: Appropriate, Attentive and Sharing      Speech:  normal      Thought Process/Content: Logical      Affective Functioning: Congruent      Mood: euthymic      Level of consciousness:  Alert and Attentive      Response to Learning: Able to verbalize current knowledge/experience, Able to retain information and Progressing to goal      Endings: None Reported    Modes of Intervention: Education, Support, Socialization, Clarifying and Reality-testing      Discipline Responsible: Psychoeducational Specialist      Signature:  Russ Villa

## 2021-04-05 ENCOUNTER — APPOINTMENT (OUTPATIENT)
Dept: GENERAL RADIOLOGY | Age: 61
DRG: 885 | End: 2021-04-05
Attending: PSYCHIATRY & NEUROLOGY
Payer: COMMERCIAL

## 2021-04-05 LAB
ABSOLUTE EOS #: 0.1 K/UL (ref 0–0.4)
ABSOLUTE IMMATURE GRANULOCYTE: NORMAL K/UL (ref 0–0.3)
ABSOLUTE LYMPH #: 2 K/UL (ref 1–4.8)
ABSOLUTE MONO #: 0.4 K/UL (ref 0.1–1.3)
ALBUMIN SERPL-MCNC: 4.3 G/DL (ref 3.5–5.2)
ALBUMIN/GLOBULIN RATIO: ABNORMAL (ref 1–2.5)
ALP BLD-CCNC: 58 U/L (ref 35–104)
ALT SERPL-CCNC: 15 U/L (ref 5–33)
ANION GAP SERPL CALCULATED.3IONS-SCNC: 10 MMOL/L (ref 9–17)
ANION GAP SERPL CALCULATED.3IONS-SCNC: 7 MMOL/L (ref 9–17)
AST SERPL-CCNC: 11 U/L
BASOPHILS # BLD: 1 % (ref 0–2)
BASOPHILS ABSOLUTE: 0.1 K/UL (ref 0–0.2)
BILIRUB SERPL-MCNC: 0.29 MG/DL (ref 0.3–1.2)
BUN BLDV-MCNC: 14 MG/DL (ref 6–20)
BUN BLDV-MCNC: 15 MG/DL (ref 6–20)
BUN/CREAT BLD: ABNORMAL (ref 9–20)
BUN/CREAT BLD: ABNORMAL (ref 9–20)
CALCIUM SERPL-MCNC: 9 MG/DL (ref 8.6–10.4)
CALCIUM SERPL-MCNC: 9.2 MG/DL (ref 8.6–10.4)
CHLORIDE BLD-SCNC: 103 MMOL/L (ref 98–107)
CHLORIDE BLD-SCNC: 105 MMOL/L (ref 98–107)
CO2: 27 MMOL/L (ref 20–31)
CO2: 27 MMOL/L (ref 20–31)
CREAT SERPL-MCNC: 1.05 MG/DL (ref 0.5–0.9)
CREAT SERPL-MCNC: 1.07 MG/DL (ref 0.5–0.9)
DIFFERENTIAL TYPE: NORMAL
EOSINOPHILS RELATIVE PERCENT: 2 % (ref 0–4)
GFR AFRICAN AMERICAN: >60 ML/MIN
GFR AFRICAN AMERICAN: >60 ML/MIN
GFR NON-AFRICAN AMERICAN: 53 ML/MIN
GFR NON-AFRICAN AMERICAN: 54 ML/MIN
GFR SERPL CREATININE-BSD FRML MDRD: ABNORMAL ML/MIN/{1.73_M2}
GLUCOSE BLD-MCNC: 103 MG/DL (ref 70–99)
GLUCOSE BLD-MCNC: 74 MG/DL (ref 70–99)
HCT VFR BLD CALC: 41.6 % (ref 36–46)
HEMOGLOBIN: 13.9 G/DL (ref 12–16)
IMMATURE GRANULOCYTES: NORMAL %
LYMPHOCYTES # BLD: 34 % (ref 24–44)
MCH RBC QN AUTO: 30.9 PG (ref 26–34)
MCHC RBC AUTO-ENTMCNC: 33.4 G/DL (ref 31–37)
MCV RBC AUTO: 92.3 FL (ref 80–100)
MONOCYTES # BLD: 7 % (ref 1–7)
NRBC AUTOMATED: NORMAL PER 100 WBC
PDW BLD-RTO: 12.9 % (ref 11.5–14.9)
PLATELET # BLD: 283 K/UL (ref 150–450)
PLATELET ESTIMATE: NORMAL
PMV BLD AUTO: 7.4 FL (ref 6–12)
POTASSIUM SERPL-SCNC: 4.3 MMOL/L (ref 3.7–5.3)
POTASSIUM SERPL-SCNC: 5.1 MMOL/L (ref 3.7–5.3)
RBC # BLD: 4.51 M/UL (ref 4–5.2)
RBC # BLD: NORMAL 10*6/UL
SEG NEUTROPHILS: 56 % (ref 36–66)
SEGMENTED NEUTROPHILS ABSOLUTE COUNT: 3.3 K/UL (ref 1.3–9.1)
SODIUM BLD-SCNC: 139 MMOL/L (ref 135–144)
SODIUM BLD-SCNC: 140 MMOL/L (ref 135–144)
TOTAL PROTEIN: 6.4 G/DL (ref 6.4–8.3)
WBC # BLD: 5.9 K/UL (ref 3.5–11)
WBC # BLD: NORMAL 10*3/UL

## 2021-04-05 PROCEDURE — 6370000000 HC RX 637 (ALT 250 FOR IP): Performed by: INTERNAL MEDICINE

## 2021-04-05 PROCEDURE — 99232 SBSQ HOSP IP/OBS MODERATE 35: CPT | Performed by: PSYCHIATRY & NEUROLOGY

## 2021-04-05 PROCEDURE — 80053 COMPREHEN METABOLIC PANEL: CPT

## 2021-04-05 PROCEDURE — 6370000000 HC RX 637 (ALT 250 FOR IP): Performed by: PSYCHIATRY & NEUROLOGY

## 2021-04-05 PROCEDURE — 99222 1ST HOSP IP/OBS MODERATE 55: CPT | Performed by: INTERNAL MEDICINE

## 2021-04-05 PROCEDURE — 36415 COLL VENOUS BLD VENIPUNCTURE: CPT

## 2021-04-05 PROCEDURE — 71045 X-RAY EXAM CHEST 1 VIEW: CPT

## 2021-04-05 PROCEDURE — 1240000000 HC EMOTIONAL WELLNESS R&B

## 2021-04-05 PROCEDURE — 80048 BASIC METABOLIC PNL TOTAL CA: CPT

## 2021-04-05 PROCEDURE — 90833 PSYTX W PT W E/M 30 MIN: CPT | Performed by: PSYCHIATRY & NEUROLOGY

## 2021-04-05 PROCEDURE — 85025 COMPLETE CBC W/AUTO DIFF WBC: CPT

## 2021-04-05 RX ORDER — SODIUM POLYSTYRENE SULFONATE 4.1 MEQ/G
15 POWDER, FOR SUSPENSION ORAL; RECTAL ONCE
Status: COMPLETED | OUTPATIENT
Start: 2021-04-05 | End: 2021-04-05

## 2021-04-05 RX ADMIN — SERTRALINE HYDROCHLORIDE 150 MG: 100 TABLET ORAL at 08:28

## 2021-04-05 RX ADMIN — BUSPIRONE HYDROCHLORIDE 15 MG: 15 TABLET ORAL at 08:29

## 2021-04-05 RX ADMIN — SODIUM POLYSTYRENE SULFONATE 15 G: 1 POWDER ORAL; RECTAL at 10:46

## 2021-04-05 RX ADMIN — ATORVASTATIN CALCIUM 20 MG: 20 TABLET, FILM COATED ORAL at 08:29

## 2021-04-05 RX ADMIN — BUSPIRONE HYDROCHLORIDE 15 MG: 15 TABLET ORAL at 20:40

## 2021-04-05 RX ADMIN — CLONIDINE HYDROCHLORIDE 0.1 MG: 0.1 TABLET ORAL at 20:40

## 2021-04-05 RX ADMIN — OMEPRAZOLE 20 MG: 20 CAPSULE, DELAYED RELEASE ORAL at 08:29

## 2021-04-05 RX ADMIN — LITHIUM CARBONATE 300 MG: 300 TABLET, FILM COATED, EXTENDED RELEASE ORAL at 08:29

## 2021-04-05 RX ADMIN — TRAZODONE HYDROCHLORIDE 50 MG: 50 TABLET ORAL at 20:40

## 2021-04-05 NOTE — FLOWSHEET NOTE
*Patient participated in the Spirituality Group       04/05/21 1514   Encounter Summary   Services provided to: Patient   Referral/Consult From: Rounding   Continue Visiting   (4/5/21)   Complexity of Encounter Moderate   Length of Encounter 30 minutes   Spiritual/[facility]   Type Spiritual support   Assessment Calm; Approachable   Intervention Active listening   Outcome Receptive;Engaged in conversation

## 2021-04-05 NOTE — GROUP NOTE
Group Therapy Note    Date: 4/5/2021    Group Start Time: 1005  Group End Time: 9647  Group Topic: Psychoeducation    DANGELO RocaS    Group Therapy Note    Attendees: 8    Patient's Goal:  Pt will identify ways to advocate for mental health and identify ways to seek help when needed. Notes:  Pt attended group and participated    Status After Intervention:  Improved    Participation Level:  Active Listener and Interactive    Participation Quality: Appropriate, Attentive, Sharing and Supportive      Speech:  normal      Thought Process/Content: Logical  Linear      Affective Functioning: Congruent      Mood: euthymic      Level of consciousness:  Alert, Oriented x4 and Attentive      Response to Learning: Able to verbalize current knowledge/experience, Able to verbalize/acknowledge new learning, Able to retain information, Capable of insight, Able to change behavior and Progressing to goal      Endings: None Reported    Modes of Intervention: Education, Support, Socialization, Exploration and Problem-solving      Discipline Responsible: Psychoeducational Specialist      Signature:  Richard Rousseau [place]

## 2021-04-05 NOTE — GROUP NOTE
Group Therapy Note    Date: 4/4/2021    Group Start Time: 2000  Group End Time: 2025  Group Topic: Wrap-Up    [facility]    Allison Buckley RN        Group Therapy Note    Attendees: 14         Patient's Goal:  To relax and get ready for bed    Notes:  Patient was an active listener and participant    Status After Intervention:  Unchanged    Participation Level:  Active Listener    Participation Quality: Appropriate, Attentive, Sharing and Supportive      Speech:  normal      Thought Process/Content: Logical      Affective Functioning: Congruent      Mood: anxious and depressed      Level of consciousness:  Alert, Oriented x4 and Attentive      Response to Learning: Able to verbalize current knowledge/experience, Able to verbalize/acknowledge new learning, Able to retain information, Capable of insight and Able to change behavior, progressing towards goal      Endings: None Reported    Modes of Intervention: Education, Support, Socialization, Activity and Media      Discipline Responsible: Registered Nurse      Signature:  Allison Buckley RN

## 2021-04-05 NOTE — PLAN OF CARE
Patient has been out in the milieu social with peers. Patient states she has been eating and sleeping okay. Patient was compliant with scheduled medications this evening. Patient denies any suicidal thoughts at this time. Patient agrees to come talk with staff if having any thoughts to harm herself this shift. 15 min rounds continued for patient safety.    Problem: Altered Mood, Depressive Behavior:  Goal: Ability to disclose and discuss suicidal ideas will improve  Description: Ability to disclose and discuss suicidal ideas will improve  Outcome: Ongoing  Goal: Absence of self-harm  Description: Absence of self-harm  Outcome: Ongoing     Problem: Tobacco Use:  Goal: Inpatient tobacco use cessation counseling participation  Description: Inpatient tobacco use cessation counseling participation  Outcome: Ongoing

## 2021-04-05 NOTE — CONSULTS
[facility] Internal Medicine    CONSULTATION / HISTORY AND PHYSICAL EXAMINATION            Date:   4/5/2021  Patient name:  Yuliya Herzog  Date of admission:  4/2/2021 12:54 AM  MRN:   [id number]  Account:  [de-identified]  YOB: 1960  PCP:    No primary care provider on file. Room:   [address] [place] [address]  Code Status:    Full Code    Physician Requesting Consult: Wojciech Milner MD    Reason for Consult:  medical management    Chief Complaint:     No chief complaint on file. Hyperkalemia    History Obtained From:     Patient medical record nursing staff    History of Present Illness:     61-year-old lady with a history of depression anxiety for over 40 years  History of hyperlipidemia on Lipitor GERD on Prilosec  HLD  Onset more than 5 years ago  Severity is mild, not getting worse  Not associated with pancreatitis  Tolerating statin well no muscle pain        Past Medical History:     History reviewed. No pertinent past medical history. Past Surgical History:     History reviewed. No pertinent surgical history. Medications Prior to Admission:     Prior to Admission medications    Medication Sig Start Date End Date Taking? Authorizing Provider   atorvastatin (LIPITOR) 10 MG tablet Take 20 mg by mouth daily    Yes Historical Provider, MD   busPIRone (BUSPAR) 15 MG tablet Take 15 mg by mouth 2 times daily 3/5/21  Yes Historical Provider, MD   omeprazole (PRILOSEC) 10 MG delayed release capsule Take 20 mg by mouth daily    Yes Historical Provider, MD   sertraline (ZOLOFT) 100 MG tablet Take 150 mg by mouth daily 11/27/20  Yes Historical Provider, MD        Allergies:     Bupropion    Social History:     Tobacco:    reports that she has quit smoking. She has never used smokeless tobacco.  Alcohol:      has no history on file for alcohol. Drug Use:  has no history on file for drug. Family History:     History reviewed. No pertinent family history.     Review of Systems: Positive and Negative as described in HPI. CONSTITUTIONAL:  negative for fevers, chills, sweats, fatigue, weight loss  HEENT:  negative for vision, hearing changes, runny nose, throat pain  RESPIRATORY:  negative for shortness of breath, cough, congestion, wheezing. CARDIOVASCULAR:  negative for chest pain, palpitations. GASTROINTESTINAL:  negative for nausea, vomiting, diarrhea, constipation, change in bowel habits, abdominal pain   GENITOURINARY:  negative for difficulty of urination, burning with urination, frequency   INTEGUMENT:  negative for rash, skin lesions, easy bruising   HEMATOLOGIC/LYMPHATIC:  negative for swelling/edema   ALLERGIC/IMMUNOLOGIC:  negative for urticaria , itching  ENDOCRINE:  negative increase in drinking, increase in urination, hot or cold intolerance  MUSCULOSKELETAL:  negative joint pains, muscle aches, swelling of joints  NEUROLOGICAL:  negative for headaches, dizziness, lightheadedness, numbness, pain, tingling extremities       Physical Exam:     BP 96/62   Pulse 76   Temp 98 °F (36.7 °C) (Oral)   Resp 14   Ht 5' 6\" (1.676 m)   Wt 174 lb (78.9 kg)   BMI 28.08 kg/m²   Temp (24hrs), Av.2 °F (36.8 °C), Min:98 °F (36.7 °C), Max:98.4 °F (36.9 °C)    No results for input(s): POCGLU in the last 72 hours. No intake or output data in the 24 hours ending 21 1713    General Appearance:  alert, well appearing, and in no acute distress  Mental status: oriented to person, place, and time with normal affect  Head:  normocephalic, atraumatic.   Eye: no icterus, redness, pupils equal and reactive, extraocular eye movements intact, conjunctiva clear  Ear: normal external ear, no discharge, hearing intact  Nose:  no drainage noted  Mouth: mucous membranes moist  Neck: supple, no carotid bruits, thyroid not palpable  Lungs: Bilateral equal air entry, clear to ausculation, no wheezing, rales or rhonchi, normal effort  Cardiovascular: normal rate, regular rhythm, no murmur, gallop, rub.   Abdomen: Soft, nontender, nondistended, normal bowel sounds, no hepatomegaly or splenomegaly  Neurologic: There are no new focal motor or sensory deficits, normal muscle tone and bulk, no abnormal sensation, normal speech, cranial nerves II through XII grossly intact  Skin: No gross lesions, rashes, bruising or bleeding on exposed skin area  Extremities:  peripheral pulses palpable, no pedal edema or calf pain with palpation      Investigations:      Laboratory Testing:  Recent Results (from the past 24 hour(s))   EKG 12 Lead    Collection Time: 04/04/21  5:18 PM   Result Value Ref Range    Ventricular Rate 64 BPM    Atrial Rate 64 BPM    P-R Interval 144 ms    QRS Duration 104 ms    Q-T Interval 418 ms    QTc Calculation (Bazett) 431 ms    P Axis 28 degrees    R Axis 27 degrees    T Axis 27 degrees   Comp Metabolic Prof    Collection Time: 04/05/21  6:46 AM   Result Value Ref Range    Glucose 103 (H) 70 - 99 mg/dL    BUN 15 6 - 20 mg/dL    CREATININE 1.07 (H) 0.50 - 0.90 mg/dL    Bun/Cre Ratio NOT REPORTED 9 - 20    Calcium 9.2 8.6 - 10.4 mg/dL    Sodium 139 135 - 144 mmol/L    Potassium 5.1 3.7 - 5.3 mmol/L    Chloride 105 98 - 107 mmol/L    CO2 27 20 - 31 mmol/L    Anion Gap 7 (L) 9 - 17 mmol/L    Alkaline Phosphatase 58 35 - 104 U/L    ALT 15 5 - 33 U/L    AST 11 <32 U/L    Total Bilirubin 0.29 (L) 0.3 - 1.2 mg/dL    Total Protein 6.4 6.4 - 8.3 g/dL    Albumin 4.3 3.5 - 5.2 g/dL    Albumin/Globulin Ratio NOT REPORTED 1.0 - 2.5    GFR Non-African American 53 (L) >60 mL/min    GFR African American >60 >60 mL/min    GFR Comment          GFR Staging NOT REPORTED    CBC with DIFF    Collection Time: 04/05/21  6:46 AM   Result Value Ref Range    WBC 5.9 3.5 - 11.0 k/uL    RBC 4.51 4.0 - 5.2 m/uL    Hemoglobin 13.9 12.0 - 16.0 g/dL    Hematocrit 41.6 36 - 46 %    MCV 92.3 80 - 100 fL    MCH 30.9 26 - 34 pg    MCHC 33.4 31 - 37 g/dL    RDW 12.9 11.5 - 14.9 %    Platelets 166 011 - 923 k/uL    MPV 7.4 6.0 -

## 2021-04-05 NOTE — GROUP NOTE
Group Therapy Note    Date: 4/5/2021    Group Start Time: 0900  Group End Time: 8280  Group Topic: Community Meeting    [address] [place], [place]        Group Therapy Note    Attendees: 8/21           Patient's Goal: To set daily goals and discuss a safe discharge plan      Status After Intervention:  Improved    Participation Level:  Active Listener and Interactive    Participation Quality: Appropriate, Attentive, Sharing and Supportive      Speech:  normal      Thought Process/Content: Logical      Affective Functioning: Congruent      Mood: euphoric      Level of consciousness:  Alert, Oriented x4 and Attentive      Response to Learning: Able to verbalize current knowledge/experience, Able to verbalize/acknowledge new learning, Able to retain information and Capable of insight    Modes of Intervention: Education, Support, Socialization, Exploration, Clarifying and Problem-solving      Discipline Responsible: Psychoeducational Specialist      Signature:  Ara Patino

## 2021-04-05 NOTE — PROGRESS NOTES
Daily Progress Note  4/5/2021        CHIEF COMPLAINT: Depression with suicidal ideation    Reviewed patient's current plan of care and vital signs with nursing staff. Sleep: Patient reports restless sleep approximating 5+ hours last night  Attending groups: Yes, selectively    SUBJECTIVE:    Staff report patient has maintained medication adherence and has been without behavioral change in the last 24 hours. She did utilize as needed trazodone and hydroxyzine at 2046 with some effect. Patient shares that she is not accustomed to such a flat mattress and had multiple wakings during the night, and needed to use the bathroom. Additionally she shares that she has some anxiety regarding the decision to move forward with ECT and is looking forward to discussed with attending physician later in the day. Patient did speak to her  and is less concerned related to the wellbeing of her pets at home. She reports her mood today as \"hopeful \". She continues to endorse suicidal ideation but feels safe in current acute care setting. She denies auditory or visual hallucinations or side effects related to her current medication regimen.   She denies having questions or concerns related to current plan of care      Mental Status Exam  Level of consciousness:  Awake and alert  Appearance: Personal attire, seated on chair, with good grooming and hygiene   Behavior/Motor: Pleasant, no psychomotor abnormalities noted  Attitude toward examiner:  Cooperative, attentive, good eye contact  Speech:  spontaneous, normal rate, normal volume and well articulated  Mood: Depressed  Affect: Blunted  Thought processes:  linear, goal directed and coherent  Thought content:  denies homicidal ideation  Suicidal Ideation: Endorses active suicidal ideation, verbally contracts for safety  Delusions:  no evidence of delusions  Perceptual Disturbance: Endorses improving auditory hallucinations  Cognition:  Oriented to person, place, time/date and situation  Memory: age appropriate  Insight & Judgement: improving  Medication side effects:  denies       Data   height is 5' 6\" (1.676 m) and weight is 174 lb (78.9 kg). Her oral temperature is 98 °F (36.7 °C). Her blood pressure is 96/62 and her pulse is 76. Her respiration is 14.    Labs:   Admission on 04/02/2021   Component Date Value Ref Range Status    Ventricular Rate 04/04/2021 64  BPM Preliminary    Atrial Rate 04/04/2021 64  BPM Preliminary    P-R Interval 04/04/2021 144  ms Preliminary    QRS Duration 04/04/2021 104  ms Preliminary    Q-T Interval 04/04/2021 418  ms Preliminary    QTc Calculation (Bazett) 04/04/2021 431  ms Preliminary    P Axis 04/04/2021 28  degrees Preliminary    R Axis 04/04/2021 27  degrees Preliminary    T Axis 04/04/2021 27  degrees Preliminary    Glucose 04/05/2021 103* 70 - 99 mg/dL Final    BUN 04/05/2021 15  6 - 20 mg/dL Final    CREATININE 04/05/2021 1.07* 0.50 - 0.90 mg/dL Final    Bun/Cre Ratio 04/05/2021 NOT REPORTED  9 - 20 Final    Calcium 04/05/2021 9.2  8.6 - 10.4 mg/dL Final    Sodium 04/05/2021 139  135 - 144 mmol/L Final    Potassium 04/05/2021 5.1  3.7 - 5.3 mmol/L Final    Chloride 04/05/2021 105  98 - 107 mmol/L Final    CO2 04/05/2021 27  20 - 31 mmol/L Final    Anion Gap 04/05/2021 7* 9 - 17 mmol/L Final    Alkaline Phosphatase 04/05/2021 58  35 - 104 U/L Final    ALT 04/05/2021 15  5 - 33 U/L Final    AST 04/05/2021 11  <32 U/L Final    Total Bilirubin 04/05/2021 0.29* 0.3 - 1.2 mg/dL Final    Total Protein 04/05/2021 6.4  6.4 - 8.3 g/dL Final    Albumin 04/05/2021 4.3  3.5 - 5.2 g/dL Final    Albumin/Globulin Ratio 04/05/2021 NOT REPORTED  1.0 - 2.5 Final    GFR Non- 04/05/2021 53* >60 mL/min Final    GFR  04/05/2021 >60  >60 mL/min Final    GFR Comment 04/05/2021        Final    Comment: Average GFR for 52-63 years old:   80 mL/min/1.73sq m  Chronic Kidney Disease:   <60 mL/min/1.73sq m  Kidney failure:   <15 mL/min/1.73sq m              eGFR calculated using average adult body mass.  Additional eGFR calculator available at:        [email or web address]            GFR Staging 04/05/2021 NOT REPORTED   Final    WBC 04/05/2021 5.9  3.5 - 11.0 k/uL Final    RBC 04/05/2021 4.51  4.0 - 5.2 m/uL Final    Hemoglobin 04/05/2021 13.9  12.0 - 16.0 g/dL Final    Hematocrit 04/05/2021 41.6  36 - 46 % Final    MCV 04/05/2021 92.3  80 - 100 fL Final    MCH 04/05/2021 30.9  26 - 34 pg Final    MCHC 04/05/2021 33.4  31 - 37 g/dL Final    RDW 04/05/2021 12.9  11.5 - 14.9 % Final    Platelets 68/36/2033 283  150 - 450 k/uL Final    MPV 04/05/2021 7.4  6.0 - 12.0 fL Final    NRBC Automated 04/05/2021 NOT REPORTED  per 100 WBC Final    Differential Type 04/05/2021 NOT REPORTED   Final    Seg Neutrophils 04/05/2021 56  36 - 66 % Final    Lymphocytes 04/05/2021 34  24 - 44 % Final    Monocytes 04/05/2021 7  1 - 7 % Final    Eosinophils % 04/05/2021 2  0 - 4 % Final    Basophils 04/05/2021 1  0 - 2 % Final    Immature Granulocytes 04/05/2021 NOT REPORTED  0 % Final    Segs Absolute 04/05/2021 3.30  1.3 - 9.1 k/uL Final    Absolute Lymph # 04/05/2021 2.00  1.0 - 4.8 k/uL Final    Absolute Mono # 04/05/2021 0.40  0.1 - 1.3 k/uL Final    Absolute Eos # 04/05/2021 0.10  0.0 - 0.4 k/uL Final    Basophils Absolute 04/05/2021 0.10  0.0 - 0.2 k/uL Final    Absolute Immature Granulocyte 04/05/2021 NOT REPORTED  0.00 - 0.30 k/uL Final    WBC Morphology 04/05/2021 NOT REPORTED   Final    RBC Morphology 04/05/2021 NOT REPORTED   Final    Platelet Estimate 28/90/1913 NOT REPORTED   Final            Medications  Current Facility-Administered Medications: acetaminophen (TYLENOL) tablet 650 mg, 650 mg, Oral, Q4H PRN  aluminum & magnesium hydroxide-simethicone (MAALOX) 200-200-20 MG/5ML suspension 30 mL, 30 mL, Oral, Q6H PRN  hydrOXYzine (ATARAX) tablet 50 mg, 50 mg, Oral, TID PRN  ibuprofen (ADVIL;MOTRIN) tablet 400 mg, 400 mg, Oral, Q6H PRN  polyethylene glycol (GLYCOLAX) packet 17 g, 17 g, Oral, Daily PRN  traZODone (DESYREL) tablet 50 mg, 50 mg, Oral, Nightly PRN  nicotine polacrilex (NICORETTE) gum 2 mg, 2 mg, Oral, PRN  atorvastatin (LIPITOR) tablet 20 mg, 20 mg, Oral, Daily  busPIRone (BUSPAR) tablet 15 mg, 15 mg, Oral, BID  omeprazole (PRILOSEC) delayed release capsule 20 mg, 20 mg, Oral, Daily  sertraline (ZOLOFT) tablet 150 mg, 150 mg, Oral, Daily  cloNIDine (CATAPRES) tablet 0.1 mg, 0.1 mg, Oral, Nightly  lithium (LITHOBID) extended release tablet 300 mg, 300 mg, Oral, Daily    ASSESSMENT  Severe recurrent major depression with psychotic features (HCC)     PLAN  Discussed with Dr. Grace Steinberg and treatment team   Patients symptoms show some improvement  Continue current medication regimen  Monitor need and frequency of as needed medication  Encourage participation in groups and milieu  Moving forward with ECT after discussion with attending physician  Attempt to develop insight  Psycho-education conducted. Supportive Therapy conducted. Probable discharge is undetermined at this time  Follow-up daily while in the inpatient unit    Electronically signed by Alecia Ganser, APRN - CNP on 4/5/21 at 4:25 PM EDT    **This report has been created using voice recognition software. It may contain minor errors which are inherent in voice recognition technology. **    I independently saw and evaluated the patient. I reviewed the nurse practitioners documentation above. Any additional comments or changes to the nurse practitioners documentation are stated below otherwise agree with assessment. Plan will be as follows:  Spent 30 minutes with the patient. Of that greater than 16 minutes was spent in supportive psychotherapy. Discussed risk benefits and alternatives to treatment regimen. Also discussed risk benefits and alternatives to ECT.   Patient has now contemplated ECT for 3 days and wants to move

## 2021-04-05 NOTE — GROUP NOTE
Group Therapy Note    Date: 4/5/2021    Group Start Time: 8251  Group End Time: 7750  Group Topic: Psychoeducation    BEVERLY Roca    Group Therapy Note    Attendees: 9    Patient's Goal:  Pt will identify benefits of music for coping    Notes:  Pt attended group and participated    Status After Intervention:  Improved    Participation Level:  Active Listener and Interactive    Participation Quality: Appropriate, Attentive, Sharing and Supportive      Speech:  normal      Thought Process/Content: Logical  Linear      Affective Functioning: Congruent      Mood: euthymic      Level of consciousness:  Alert, Oriented x4 and Attentive      Response to Learning: Able to verbalize current knowledge/experience, Able to verbalize/acknowledge new learning, Able to retain information, Capable of insight, Able to change behavior and Progressing to goal      Endings: None Reported    Modes of Intervention: Education, Support, Socialization, Exploration and Media      Discipline Responsible: Psychoeducational Specialist      Signature:  Michael Theodore [place]

## 2021-04-06 DIAGNOSIS — F33.3 SEVERE RECURRENT MAJOR DEPRESSION WITH PSYCHOTIC FEATURES (HCC): Primary | ICD-10-CM

## 2021-04-06 LAB
ANION GAP SERPL CALCULATED.3IONS-SCNC: 10 MMOL/L (ref 9–17)
BUN BLDV-MCNC: 15 MG/DL (ref 6–20)
BUN/CREAT BLD: ABNORMAL (ref 9–20)
CALCIUM SERPL-MCNC: 9.5 MG/DL (ref 8.6–10.4)
CHLORIDE BLD-SCNC: 102 MMOL/L (ref 98–107)
CO2: 27 MMOL/L (ref 20–31)
CREAT SERPL-MCNC: 1.08 MG/DL (ref 0.5–0.9)
GFR AFRICAN AMERICAN: >60 ML/MIN
GFR NON-AFRICAN AMERICAN: 52 ML/MIN
GFR SERPL CREATININE-BSD FRML MDRD: ABNORMAL ML/MIN/{1.73_M2}
GFR SERPL CREATININE-BSD FRML MDRD: ABNORMAL ML/MIN/{1.73_M2}
GLUCOSE BLD-MCNC: 110 MG/DL (ref 70–99)
LITHIUM DATE LAST DOSE: ABNORMAL
LITHIUM DOSE AMOUNT: 300
LITHIUM DOSE TIME: 826
LITHIUM LEVEL: 0.2 MMOL/L (ref 0.6–1.2)
POTASSIUM SERPL-SCNC: 4.6 MMOL/L (ref 3.7–5.3)
SODIUM BLD-SCNC: 139 MMOL/L (ref 135–144)

## 2021-04-06 PROCEDURE — 80178 ASSAY OF LITHIUM: CPT

## 2021-04-06 PROCEDURE — 80048 BASIC METABOLIC PNL TOTAL CA: CPT

## 2021-04-06 PROCEDURE — 36415 COLL VENOUS BLD VENIPUNCTURE: CPT

## 2021-04-06 PROCEDURE — 6370000000 HC RX 637 (ALT 250 FOR IP): Performed by: PSYCHIATRY & NEUROLOGY

## 2021-04-06 PROCEDURE — 99231 SBSQ HOSP IP/OBS SF/LOW 25: CPT | Performed by: INTERNAL MEDICINE

## 2021-04-06 PROCEDURE — 99232 SBSQ HOSP IP/OBS MODERATE 35: CPT | Performed by: PSYCHIATRY & NEUROLOGY

## 2021-04-06 PROCEDURE — 1240000000 HC EMOTIONAL WELLNESS R&B

## 2021-04-06 RX ADMIN — BUSPIRONE HYDROCHLORIDE 15 MG: 15 TABLET ORAL at 08:12

## 2021-04-06 RX ADMIN — OMEPRAZOLE 20 MG: 20 CAPSULE, DELAYED RELEASE ORAL at 08:12

## 2021-04-06 RX ADMIN — CLONIDINE HYDROCHLORIDE 0.1 MG: 0.1 TABLET ORAL at 21:19

## 2021-04-06 RX ADMIN — BUSPIRONE HYDROCHLORIDE 15 MG: 15 TABLET ORAL at 21:19

## 2021-04-06 RX ADMIN — ATORVASTATIN CALCIUM 20 MG: 20 TABLET, FILM COATED ORAL at 08:12

## 2021-04-06 RX ADMIN — SERTRALINE HYDROCHLORIDE 150 MG: 100 TABLET ORAL at 08:12

## 2021-04-06 RX ADMIN — IBUPROFEN 400 MG: 400 TABLET, FILM COATED ORAL at 08:12

## 2021-04-06 RX ADMIN — LITHIUM CARBONATE 300 MG: 300 TABLET, FILM COATED, EXTENDED RELEASE ORAL at 08:12

## 2021-04-06 RX ADMIN — TRAZODONE HYDROCHLORIDE 50 MG: 50 TABLET ORAL at 21:19

## 2021-04-06 ASSESSMENT — PAIN SCALES - GENERAL: PAINLEVEL_OUTOF10: 8

## 2021-04-06 NOTE — GROUP NOTE
Group Therapy Note    Date: 4/6/2021    Group Start Time: 1100  Group End Time: 9862  Group Topic: Group Therapy    DOTTIE Dorado LSW        Group Therapy Note    Attendees: 4         Patient did not attend 1100 psychotherapy group.  1:1 talk time offered, patient refused    Signature:  DOTTIE Soriano LSW

## 2021-04-06 NOTE — GROUP NOTE
Group Therapy Note    Date: 4/6/2021    Group Start Time: 1600  Group End Time: 1700  Group Topic: Group Therapy    MARCOS Helton LPN        Group Therapy Note    Attendees: 8/17         Patient's Goal:  Healthy communication skills      Status After Intervention:  Improved    Participation Level: Interactive    Participation Quality: Appropriate      Speech:  normal      Thought Process/Content: Logical      Affective Functioning: Congruent      Mood: elevated      Level of consciousness:  Alert      Response to Learning: Able to verbalize current knowledge/experience      Endings: None Reported    Modes of Intervention: Activity      Discipline Responsible: Licensed Practical Nurse      Signature:  Yordan Helton LPN

## 2021-04-06 NOTE — GROUP NOTE
Group Therapy Note    Date: 4/6/2021    Group Start Time: 1100  Group End Time: 6642  Group Topic: Recreational    [address], [place]        Group Therapy Note    Attendees: 5         Patient's Goal:  Develop effective communication skills. Status After Intervention:  Improved    Participation Level:  Active Listener and Interactive    Participation Quality: Appropriate, Attentive, Sharing and Supportive      Speech:  normal      Thought Process/Content: Logical      Affective Functioning: Congruent      Mood: euphoric      Level of consciousness:  Alert, Oriented x4 and Attentive      Response to Learning: Able to verbalize current knowledge/experience, Able to verbalize/acknowledge new learning, Able to retain information and Capable of insight    Modes of Intervention: Education, Socialization, Exploration, Clarifying, Problem-solving and Activity      Discipline Responsible: Psychoeducational Specialist      Signature:  Barbra Alvarado

## 2021-04-06 NOTE — CONSULTS
Positive and Negative as described in HPI. CONSTITUTIONAL:  negative for fevers, chills, sweats, fatigue, weight loss  HEENT:  negative for vision, hearing changes, runny nose, throat pain  RESPIRATORY:  negative for shortness of breath, cough, congestion, wheezing. CARDIOVASCULAR:  negative for chest pain, palpitations. GASTROINTESTINAL:  negative for nausea, vomiting, diarrhea, constipation, change in bowel habits, abdominal pain   GENITOURINARY:  negative for difficulty of urination, burning with urination, frequency   INTEGUMENT:  negative for rash, skin lesions, easy bruising   HEMATOLOGIC/LYMPHATIC:  negative for swelling/edema   ALLERGIC/IMMUNOLOGIC:  negative for urticaria , itching  ENDOCRINE:  negative increase in drinking, increase in urination, hot or cold intolerance  MUSCULOSKELETAL:  negative joint pains, muscle aches, swelling of joints  NEUROLOGICAL:  negative for headaches, dizziness, lightheadedness, numbness, pain, tingling extremities       Physical Exam:     BP 99/61   Pulse 109   Temp 98 °F (36.7 °C) (Temporal)   Resp 15   Ht 5' 6\" (1.676 m)   Wt 174 lb (78.9 kg)   BMI 28.08 kg/m²   Temp (24hrs), Av °F (36.7 °C), Min:98 °F (36.7 °C), Max:98 °F (36.7 °C)    No results for input(s): POCGLU in the last 72 hours. No intake or output data in the 24 hours ending 21 1201    General Appearance:  alert, well appearing, and in no acute distress  Mental status: oriented to person, place, and time with normal affect  Head:  normocephalic, atraumatic.   Eye: no icterus, redness, pupils equal and reactive, extraocular eye movements intact, conjunctiva clear  Ear: normal external ear, no discharge, hearing intact  Nose:  no drainage noted  Mouth: mucous membranes moist  Neck: supple, no carotid bruits, thyroid not palpable  Lungs: Bilateral equal air entry, clear to ausculation, no wheezing, rales or rhonchi, normal effort  Cardiovascular: normal rate, regular rhythm, no murmur, gallop, rub.   Abdomen: Soft, nontender, nondistended, normal bowel sounds, no hepatomegaly or splenomegaly  Neurologic: There are no new focal motor or sensory deficits, normal muscle tone and bulk, no abnormal sensation, normal speech, cranial nerves II through XII grossly intact  Skin: No gross lesions, rashes, bruising or bleeding on exposed skin area  Extremities:  peripheral pulses palpable, no pedal edema or calf pain with palpation      Investigations:      Laboratory Testing:  Recent Results (from the past 24 hour(s))   BASIC METABOLIC PANEL    Collection Time: 04/05/21  5:34 PM   Result Value Ref Range    Glucose 74 70 - 99 mg/dL    BUN 14 6 - 20 mg/dL    CREATININE 1.05 (H) 0.50 - 0.90 mg/dL    Bun/Cre Ratio NOT REPORTED 9 - 20    Calcium 9.0 8.6 - 10.4 mg/dL    Sodium 140 135 - 144 mmol/L    Potassium 4.3 3.7 - 5.3 mmol/L    Chloride 103 98 - 107 mmol/L    CO2 27 20 - 31 mmol/L    Anion Gap 10 9 - 17 mmol/L    GFR Non-African American 54 (L) >60 mL/min    GFR African American >60 >60 mL/min    GFR Comment          GFR Staging NOT REPORTED    LITHIUM LEVEL    Collection Time: 04/06/21  7:44 AM   Result Value Ref Range    Lithium Lvl 0.2 (L) 0.6 - 1.2 mmol/L    Lithium Dose Amount 300     Lithium Date Last Dose 40,521     Lithium Dose Time 346    BASIC METABOLIC PANEL    Collection Time: 04/06/21  7:44 AM   Result Value Ref Range    Glucose 110 (H) 70 - 99 mg/dL    BUN 15 6 - 20 mg/dL    CREATININE 1.08 (H) 0.50 - 0.90 mg/dL    Bun/Cre Ratio NOT REPORTED 9 - 20    Calcium 9.5 8.6 - 10.4 mg/dL    Sodium 139 135 - 144 mmol/L    Potassium 4.6 3.7 - 5.3 mmol/L    Chloride 102 98 - 107 mmol/L    CO2 27 20 - 31 mmol/L    Anion Gap 10 9 - 17 mmol/L    GFR Non-African American 52 (L) >60 mL/min    GFR African American >60 >60 mL/min    GFR Comment          GFR Staging NOT REPORTED            Consultations:   IP CONSULT TO INTERNAL MEDICINE  Assessment :      Primary Problem  Severe recurrent major depression with psychotic features [facility])    Active Hospital Problems    Diagnosis Date Noted    Severe recurrent major depression with psychotic features ([facility] [address] [F33.3] 04/02/2021       Plan:   Medical evaluation preop clearance for electroconvulsive therapy    1. Borderline hyperkalemia potassium 5.1  2. Bladder scan rule out obstruction Kayexalate 15 g given this morning  3. Low potassium diet avoid banana and orange juice  4. BMP today  5. Borderline hypotension no dizziness if possible avoid clonidine  6. Low risk for procedure cleared if repeat BMP is normal  apirl 6  Hyperkalemia resolved  junie sign off   Pl call as needed      Tobias Handley MD  4/6/2021  12:01 PM    Copy sent to Dr. Amber Mathew primary care provider on file. Please note that this chart was generated using voice recognition Dragon dictation software. Although every effort was made to ensure the accuracy of this automated transcription, some errors in transcription may have occurred.

## 2021-04-06 NOTE — GROUP NOTE
Group Therapy Note    Date: 4/6/2021    Group Start Time: 0840  Group End Time: 0915  Group Topic: Community Meeting    [address] [place], [place]        Group Therapy Note    Attendees: 11/19         Patient's Goal:  Set daily goals, identify one positive coping mechanism    Status After Intervention:  Improved    Participation Level:  Active Listener and Interactive    Participation Quality: Appropriate, Attentive, Sharing and Supportive      Speech:  normal      Thought Process/Content: Logical      Affective Functioning: Congruent      Mood: euphoric      Level of consciousness:  Alert, Oriented x4 and Attentive      Response to Learning: Able to verbalize current knowledge/experience, Able to verbalize/acknowledge new learning, Able to retain information, Capable of insight and Able to change behavior        Modes of Intervention: Education, Support, Socialization, Exploration, Clarifying, Problem-solving and Activity      Discipline Responsible: Psychoeducational Specialist      Signature:  Trey Billy

## 2021-04-06 NOTE — GROUP NOTE
Group Therapy Note    Date: 4/6/2021    Group Start Time: 1350  Group End Time: 0304  Group Topic: Cognitive Skills    [address], [facility]        Group Therapy Note    Attendees: 9/18               Patient's Goal:  How to identify triggers to anxiety and depression, improve positive coping skills. Status After Intervention:  Improved    Participation Level:  Active Listener and Interactive    Participation Quality: Appropriate, Attentive and Sharing      Speech:  normal      Thought Process/Content: Logical      Affective Functioning: Congruent      Mood: euphoric      Level of consciousness:  Alert, Oriented x4 and Attentive      Response to Learning: Able to verbalize current knowledge/experience, Able to verbalize/acknowledge new learning, Able to retain information and Capable of insight      Modes of Intervention: Education, Support, Socialization, Exploration and Clarifying      Discipline Responsible: Psychoeducational Specialist      Signature:  Terrence Marti

## 2021-04-06 NOTE — PROGRESS NOTES
Daily Progress Note  4/6/2021        CHIEF COMPLAINT: Depression with suicidal ideation    Reviewed patient's current plan of care and vital signs with nursing staff. Sleep: Patient reports restless sleep approximating 8+ hours last night  Attending groups: Yes, selectively    SUBJECTIVE:    Staff report patient has maintained medication adherence and has been without behavioral change in the last 24 hours. She did utilize as needed trazodone and hydroxyzine at 2039 and reports experiencing the best sleep ever since arriving to the hospital.  Patient is agreeable to assessment while sitting in common area as she has been working to read a novel \"finding my bad ass self \"which she reports is beneficial to her limited attention span as it offers short chapters. She does confirm that she and her family have finalized plan to move forward with ECT treatments beginning tomorrow. Patient shares that she is nervous however hopeful as she has struggled with depression for a large percentage of her life and is looking forward to improving the stability of her mood. Patient does confirm that the addition of lithium to her medication regimen has been beneficial.  She denies side effects of her medications. She endorses a good appetite. She denies auditory or visual hallucinations. She continues to endorse suicidal ideations but is able to contract for safety on acute care setting. Discussed transitioning through menopause and the emotions that she associated with decreasing estrogen and patient agrees to explore topic in future with OB/GYN or PCP. Patient denies having additional questions or concerns.     Lithium level 0.2 today at 0 744    Mental Status Exam  Level of consciousness:  Awake and alert  Appearance: Personal attire, seated on chair, with good grooming and hygiene   Behavior/Motor: Pleasant, no psychomotor abnormalities noted  Attitude toward examiner:  Cooperative, attentive, good eye contact  Speech: spontaneous, normal rate, normal volume and well articulated  Mood: Depressed  Affect: Blunted  Thought processes:  linear, goal directed and coherent  Thought content:  denies homicidal ideation  Suicidal Ideation: Endorses active suicidal ideation, verbally contracts for safety while on unit  Delusions:  no evidence of delusions  Perceptual Disturbance: Endorses improving auditory hallucinations  Cognition:  Oriented to person, place, time/date and situation  Memory: age appropriate  Insight & Judgement: improving  Medication side effects:  denies       Data   height is 5' 6\" (1.676 m) and weight is 174 lb (78.9 kg). Her temporal temperature is 98 °F (36.7 °C). Her blood pressure is 99/61 and her pulse is 109. Her respiration is 15.    Labs:   Admission on 04/02/2021   Component Date Value Ref Range Status    Ventricular Rate 04/04/2021 64  BPM Preliminary    Atrial Rate 04/04/2021 64  BPM Preliminary    P-R Interval 04/04/2021 144  ms Preliminary    QRS Duration 04/04/2021 104  ms Preliminary    Q-T Interval 04/04/2021 418  ms Preliminary    QTc Calculation (Bazett) 04/04/2021 431  ms Preliminary    P Axis 04/04/2021 28  degrees Preliminary    R Axis 04/04/2021 27  degrees Preliminary    T Axis 04/04/2021 27  degrees Preliminary    Glucose 04/05/2021 103* 70 - 99 mg/dL Final    BUN 04/05/2021 15  6 - 20 mg/dL Final    CREATININE 04/05/2021 1.07* 0.50 - 0.90 mg/dL Final    Bun/Cre Ratio 04/05/2021 NOT REPORTED  9 - 20 Final    Calcium 04/05/2021 9.2  8.6 - 10.4 mg/dL Final    Sodium 04/05/2021 139  135 - 144 mmol/L Final    Potassium 04/05/2021 5.1  3.7 - 5.3 mmol/L Final    Chloride 04/05/2021 105  98 - 107 mmol/L Final    CO2 04/05/2021 27  20 - 31 mmol/L Final    Anion Gap 04/05/2021 7* 9 - 17 mmol/L Final    Alkaline Phosphatase 04/05/2021 58  35 - 104 U/L Final    ALT 04/05/2021 15  5 - 33 U/L Final    AST 04/05/2021 11  <32 U/L Final    Total Bilirubin 04/05/2021 0.29* 0.3 - 1.2 mg/dL Final    Total Protein 04/05/2021 6.4  6.4 - 8.3 g/dL Final    Albumin 04/05/2021 4.3  3.5 - 5.2 g/dL Final    Albumin/Globulin Ratio 04/05/2021 NOT REPORTED  1.0 - 2.5 Final    GFR Non- 04/05/2021 53* >60 mL/min Final    GFR  04/05/2021 >60  >60 mL/min Final    GFR Comment 04/05/2021        Final    Comment: Average GFR for 52-63 years old:   80 mL/min/1.73sq m  Chronic Kidney Disease:   <60 mL/min/1.73sq m  Kidney failure:   <15 mL/min/1.73sq m              eGFR calculated using average adult body mass.  Additional eGFR calculator available at:        [email or web address]            GFR Staging 04/05/2021 NOT REPORTED   Final    WBC 04/05/2021 5.9  3.5 - 11.0 k/uL Final    RBC 04/05/2021 4.51  4.0 - 5.2 m/uL Final    Hemoglobin 04/05/2021 13.9  12.0 - 16.0 g/dL Final    Hematocrit 04/05/2021 41.6  36 - 46 % Final    MCV 04/05/2021 92.3  80 - 100 fL Final    MCH 04/05/2021 30.9  26 - 34 pg Final    MCHC 04/05/2021 33.4  31 - 37 g/dL Final    RDW 04/05/2021 12.9  11.5 - 14.9 % Final    Platelets 68/45/8714 283  150 - 450 k/uL Final    MPV 04/05/2021 7.4  6.0 - 12.0 fL Final    NRBC Automated 04/05/2021 NOT REPORTED  per 100 WBC Final    Differential Type 04/05/2021 NOT REPORTED   Final    Seg Neutrophils 04/05/2021 56  36 - 66 % Final    Lymphocytes 04/05/2021 34  24 - 44 % Final    Monocytes 04/05/2021 7  1 - 7 % Final    Eosinophils % 04/05/2021 2  0 - 4 % Final    Basophils 04/05/2021 1  0 - 2 % Final    Immature Granulocytes 04/05/2021 NOT REPORTED  0 % Final    Segs Absolute 04/05/2021 3.30  1.3 - 9.1 k/uL Final    Absolute Lymph # 04/05/2021 2.00  1.0 - 4.8 k/uL Final    Absolute Mono # 04/05/2021 0.40  0.1 - 1.3 k/uL Final    Absolute Eos # 04/05/2021 0.10  0.0 - 0.4 k/uL Final    Basophils Absolute 04/05/2021 0.10  0.0 - 0.2 k/uL Final    Absolute Immature Granulocyte 04/05/2021 NOT REPORTED  0.00 - 0.30 k/uL Final    Rajwinder Mcconnell - CNP on 4/6/21 at 315 PM EDT    **This report has been created using voice recognition software. It may contain minor errors which are inherent in voice recognition technology. **    I independently saw and evaluated the patient. I reviewed the nurse practitioners documentation above. Any additional comments or changes to the nurse practitioners documentation are stated below otherwise agree with assessment. Plan will be as follows:  Patient expresses some anxiety over tomorrow's ECT treatment but still desiring to move forward. Denying any side effects to current medications. Reviewed ECT processes. Patient states that she is feeling hopeful for the first time in a long time as she contemplates ECT treatments. Able to contract for safety on the unit     PLAN  Patient s symptoms   are improving  Continue with current treatment plan including ECT treatments tomorrow  Attempt to develop insight  Psycho-education conducted. Supportive Therapy conducted.   Probable discharge is possibly by the end of the week or sooner pending response to ECT  Follow-up daily while on inpatient unit

## 2021-04-06 NOTE — PLAN OF CARE
Problem: Altered Mood, Depressive Behavior:  Goal: Ability to disclose and discuss suicidal ideas will improve  Description: Ability to disclose and discuss suicidal ideas will improve  Outcome: Ongoing   Patient currently denies any suicidal ideations. Patient did agree to let staff know should thoughts come back. Problem: Altered Mood, Depressive Behavior:  Goal: Absence of self-harm  Description: Absence of self-harm  4/6/2021 0820 by Margherita Libman, LPN  Outcome: Ongoing   Patient remains free from self harm. Every 15 min checks maintained for patient safety.

## 2021-04-06 NOTE — PLAN OF CARE
Problem: Altered Mood, Depressive Behavior:  Goal: Absence of self-harm  Description: Absence of self-harm  Outcome: Ongoing   Patient absent from self harm, denies suicidal ideation. Patient states that today has, been a better day for her. Patient states that sleep has gotten a little better but still hasn't slept through the night. Patient brightened and cooperative states anxiety comes and goes from being admitted here Q15  Problem: Tobacco Use:  Goal: Inpatient tobacco use cessation counseling participation  Description: Inpatient tobacco use cessation counseling participation  Outcome: Ongoing   Patient participates in tobacco counseling.

## 2021-04-07 ENCOUNTER — APPOINTMENT (OUTPATIENT)
Dept: POSTOP/PACU | Age: 61
End: 2021-04-07
Payer: COMMERCIAL

## 2021-04-07 ENCOUNTER — ANESTHESIA EVENT (OUTPATIENT)
Dept: POSTOP/PACU | Age: 61
End: 2021-04-07

## 2021-04-07 ENCOUNTER — ANESTHESIA (OUTPATIENT)
Dept: POSTOP/PACU | Age: 61
End: 2021-04-07

## 2021-04-07 VITALS — TEMPERATURE: 96.8 F | OXYGEN SATURATION: 100 %

## 2021-04-07 PROCEDURE — 90870 ELECTROCONVULSIVE THERAPY: CPT

## 2021-04-07 PROCEDURE — 90870 ELECTROCONVULSIVE THERAPY: CPT | Performed by: PSYCHIATRY & NEUROLOGY

## 2021-04-07 PROCEDURE — 6370000000 HC RX 637 (ALT 250 FOR IP): Performed by: PSYCHIATRY & NEUROLOGY

## 2021-04-07 PROCEDURE — 99232 SBSQ HOSP IP/OBS MODERATE 35: CPT | Performed by: PSYCHIATRY & NEUROLOGY

## 2021-04-07 PROCEDURE — 2580000003 HC RX 258: Performed by: PSYCHIATRY & NEUROLOGY

## 2021-04-07 PROCEDURE — 1240000000 HC EMOTIONAL WELLNESS R&B

## 2021-04-07 PROCEDURE — GZB3ZZZ ELECTROCONVULSIVE THERAPY, BILATERAL-MULTIPLE SEIZURE: ICD-10-PCS | Performed by: PSYCHIATRY & NEUROLOGY

## 2021-04-07 PROCEDURE — 6360000002 HC RX W HCPCS: Performed by: NURSE ANESTHETIST, CERTIFIED REGISTERED

## 2021-04-07 PROCEDURE — 3700000000 HC ANESTHESIA ATTENDED CARE

## 2021-04-07 PROCEDURE — 7100000001 HC PACU RECOVERY - ADDTL 15 MIN

## 2021-04-07 PROCEDURE — 7100000000 HC PACU RECOVERY - FIRST 15 MIN

## 2021-04-07 PROCEDURE — 3700000001 HC ADD 15 MINUTES (ANESTHESIA)

## 2021-04-07 PROCEDURE — 2500000003 HC RX 250 WO HCPCS: Performed by: NURSE ANESTHETIST, CERTIFIED REGISTERED

## 2021-04-07 RX ORDER — DIPHENHYDRAMINE HYDROCHLORIDE 50 MG/ML
12.5 INJECTION INTRAMUSCULAR; INTRAVENOUS
Status: ACTIVE | OUTPATIENT
Start: 2021-04-07 | End: 2021-04-07

## 2021-04-07 RX ORDER — MORPHINE SULFATE 2 MG/ML
2 INJECTION, SOLUTION INTRAMUSCULAR; INTRAVENOUS EVERY 5 MIN PRN
Status: DISCONTINUED | OUTPATIENT
Start: 2021-04-07 | End: 2021-04-09 | Stop reason: HOSPADM

## 2021-04-07 RX ORDER — SUCCINYLCHOLINE CHLORIDE 20 MG/ML
INJECTION INTRAMUSCULAR; INTRAVENOUS PRN
Status: DISCONTINUED | OUTPATIENT
Start: 2021-04-07 | End: 2021-04-07 | Stop reason: SDUPTHER

## 2021-04-07 RX ORDER — SODIUM CHLORIDE 9 MG/ML
INJECTION, SOLUTION INTRAVENOUS CONTINUOUS
Status: DISCONTINUED | OUTPATIENT
Start: 2021-04-07 | End: 2021-04-09 | Stop reason: HOSPADM

## 2021-04-07 RX ORDER — MEPERIDINE HYDROCHLORIDE 25 MG/ML
12.5 INJECTION INTRAMUSCULAR; INTRAVENOUS; SUBCUTANEOUS EVERY 5 MIN PRN
Status: DISCONTINUED | OUTPATIENT
Start: 2021-04-07 | End: 2021-04-09 | Stop reason: HOSPADM

## 2021-04-07 RX ORDER — SUCCINYLCHOLINE/SOD CL,ISO/PF 200MG/10ML
SYRINGE (ML) INTRAVENOUS
Status: DISPENSED
Start: 2021-04-07 | End: 2021-04-07

## 2021-04-07 RX ORDER — LABETALOL HYDROCHLORIDE 5 MG/ML
5 INJECTION, SOLUTION INTRAVENOUS EVERY 10 MIN PRN
Status: DISCONTINUED | OUTPATIENT
Start: 2021-04-07 | End: 2021-04-09 | Stop reason: HOSPADM

## 2021-04-07 RX ORDER — ONDANSETRON 2 MG/ML
4 INJECTION INTRAMUSCULAR; INTRAVENOUS
Status: ACTIVE | OUTPATIENT
Start: 2021-04-07 | End: 2021-04-07

## 2021-04-07 RX ADMIN — SERTRALINE HYDROCHLORIDE 150 MG: 100 TABLET ORAL at 09:08

## 2021-04-07 RX ADMIN — Medication 80 MG: at 08:05

## 2021-04-07 RX ADMIN — BUSPIRONE HYDROCHLORIDE 15 MG: 15 TABLET ORAL at 20:36

## 2021-04-07 RX ADMIN — LITHIUM CARBONATE 300 MG: 300 TABLET, FILM COATED, EXTENDED RELEASE ORAL at 09:09

## 2021-04-07 RX ADMIN — ATORVASTATIN CALCIUM 20 MG: 20 TABLET, FILM COATED ORAL at 09:08

## 2021-04-07 RX ADMIN — SODIUM CHLORIDE: 9 INJECTION, SOLUTION INTRAVENOUS at 07:37

## 2021-04-07 RX ADMIN — ACETAMINOPHEN 650 MG: 325 TABLET ORAL at 06:38

## 2021-04-07 RX ADMIN — TRAZODONE HYDROCHLORIDE 50 MG: 50 TABLET ORAL at 20:36

## 2021-04-07 RX ADMIN — BUSPIRONE HYDROCHLORIDE 15 MG: 15 TABLET ORAL at 09:08

## 2021-04-07 RX ADMIN — SUCCINYLCHOLINE CHLORIDE 80 MG: 20 INJECTION, SOLUTION INTRAMUSCULAR; INTRAVENOUS at 08:05

## 2021-04-07 RX ADMIN — HYDROXYZINE HYDROCHLORIDE 50 MG: 50 TABLET, FILM COATED ORAL at 20:36

## 2021-04-07 RX ADMIN — OMEPRAZOLE 20 MG: 20 CAPSULE, DELAYED RELEASE ORAL at 09:08

## 2021-04-07 ASSESSMENT — PAIN - FUNCTIONAL ASSESSMENT: PAIN_FUNCTIONAL_ASSESSMENT: 0-10

## 2021-04-07 ASSESSMENT — ENCOUNTER SYMPTOMS
STRIDOR: 0
SHORTNESS OF BREATH: 0

## 2021-04-07 ASSESSMENT — LIFESTYLE VARIABLES: SMOKING_STATUS: 0

## 2021-04-07 ASSESSMENT — PAIN SCALES - GENERAL
PAINLEVEL_OUTOF10: 0

## 2021-04-07 NOTE — BH NOTE
Patient returned to the unit, via wheelchair, no distress noted, no complaints voiced. Vitals obtained. Meal tray provided.

## 2021-04-07 NOTE — PROGRESS NOTES
Thorne and Asberg Depression Rating Scale  4/7/2021      Oswald Harris  1960  [id number]    Total Score = 42      1. Apparent Sadness = 4 (Representing despondency, gloom and despair, reflected in speech, facial expression, and posture)    Rate by depth and inability to brighten up.  0 - No sadness  1  2 - Looks dispirited but does brighten up without difficulty. 3  4 - Appears sad and unhappy most of the time. 5   6 - Looks miserable all the time. Extremely despondent. 2.   Reported Sadness = 4 (Representing reports of depressed mood, regardless of whether it is reflected in appearance or not. Includes low spirits, despondency or the feeling of being beyond help and without hope)      Rate according to intensity, duration and the extent to which the mood is reported to be influenced by events. 0 - Occasional sadness in keeping with the circumstances    1    2 - Sad or low but brightens up without difficulty. 3    4 - Pervasive feelings of sadness or gloominess. The mood is still influenced by external circumstances. 5    6 - Continuous or unvarying sadness, misery or despondency. 3.   Inner Tension = 6 (Representing feelings of ill-defined discomfort, edginess, inner turmoil, mental tension mounting to either panic, dread or anguish)        Rate according to intensity, frequency, duration and the extent of reassurance called for. 0 - Placid. Only fleeting inner tension. 1    2 - Occasional feelings of edginess and ill defined discomfort. 3    4 - Continuous feelings of inner tension or intermittent panic which the patient can only master with some difficulty. 5    6 - Unrelenting dread or anguish. Overwhelming panic.     4.   Reduced Sleep = 4 (Representing the experience of reduced duration or depth of sleep compared to the subject's own normal pattern when well)      0 - Sleeps as usual.    1    2 - Slightly difficulty dropping off to sleep or slightly reduced, light or fitful sleep.    3    4 - Sleep reduced or broken by at least two hours. 5    6 - Less than two or three hours sleep. 5.   Reduced Appetite = 3 (Representing the feeling of a loss of appetite compared with when well)      Rate by loss of desire for food or the need to force oneself to eat.    0 - Normal or increased appetite. 1    2 - Slightly reduced appetite. 3    4 - No appetite. Food is tasteless. 5    6 - Needs persuasion to eat at all. 6.   Concentration Difficulties = 4 (Representing difficulties in collecting one's thoughts mounting to incapacitating lack of concentration)        Rate according to intensity, frequency, and degree of incapacity produced. 0 - No difficulties in concentrating. 1    2 - Occasional difficulties in collecting one's thoughts. 3    4 - Difficulties in concentrating and sustaining thought which reduces ability to read or hold a conversation. 5    6 - Unable to read or converse without great difficulty. 7.   Lassitude = 5 (Representing a difficulty getting started or slowness initiating and performing everyday activities)      0 - Hardly any difficulty in getting started. No sluggishness. 1    2 - Difficulties in starting activities. 3    4 - Difficulties in starting simple routine activities which are carried out with effort. 5    6 - Complete lassitude. Unable to do anything without help. 8.   Inability to Feel = 4 (Representing the subjective experience of reduced interest in the surroundings, or activities that normally give pleasure. The ability to react with adequate emotion to circumstances or people is reduced)      0 - Normal interest in the surroundings and in other people. 1    2 - Reduced ability to enjoy usual interests. 3    4 - Loss of interest in the surroundings. Loss of feelings or friends and acquaintances.     5    6 - The experience of being emotionally paralyzed, inability to feel anger, grief or pleasure and a complete or even painful failure to feel for close relatives and friends. 9.   Pessimistic Thoughts = 5 (Representing thoughts of guilt, inferiority, self-reproach, sinfulness, remorse and ruin)      0 - No pessimistic thoughts. 1    2 - Fluctuating ideas of failure, self-reproach or self depreciation. 3    4 - Persistent self-accusations, or definite but still rational ideas of guilt or sin. Increasingly pessimistic about the future. 5    6 - Delusions of ruin, remorse or unredeemable sin. Self-accusations which are absurd and unshakable. 10.  Suicidal Thoughts = 3 (Representing the feeling that life is not worth living, that a natural death would be welcome, suicidal thoughts, and preparations for suicide)      Suicidal attempts should not in themselves influence the rating. 0 - Enjoys life or takes it as it comes. 1    2 - Weary of life. Only fleeting suicidal thoughts. 3    4 - Probably better off dead. Suicidal thoughts are common, and suicide is considered as a possible solution, but without specific plans or intention. 5    6 - Explicit plans for suicide when there is an opportunity. Active preparation for suicide.

## 2021-04-07 NOTE — PLAN OF CARE
Problem: Altered Mood, Depressive Behavior:  Goal: Ability to disclose and discuss suicidal ideas will improve  Description: Ability to disclose and discuss suicidal ideas will improve  Outcome: Ongoing  Note: Patient is accepting of 1:1 talk time with staff. Patient admits to some anxiety. Patient is isolative to self and room. Patient is eating and sleeping well. Reassurance and support provided. Q15 minute checks maintained. Patient remains safe at this time. Problem: Altered Mood, Depressive Behavior:  Goal: Absence of self-harm  Description: Absence of self-harm  Outcome: Ongoing  Note: No self harm behaviors noted. Patient denies suicidal and homicidal ideation and auditory and visual hallucinations and verbally agrees to approach staff if thoughts of self harm arises. Reassurance and support provided. Q15 minute checks maintained.  Patient remains safe at this time

## 2021-04-07 NOTE — GROUP NOTE
Group Therapy Note    Date: 4/7/2021    Group Start Time: 1000  Group End Time: 1030  Group Topic: Psychotherapy    [facility] Patty Rubinstein, [facility]        Group Therapy Note    Patient declined to attend Psychotherapy group at 1000 am despite encouragement. Patient was offered a 1:1 time to meet after group or alternative activity to do and patient refused.      Signature:  Patty Rubinstein [facility], [facility], [facility]

## 2021-04-07 NOTE — PROGRESS NOTES
Pharmacy Med Education Group Note    Date: 04/07/2021  Start Time: 4118  End Time: 1600    Number Participants in Group:  6    Goal:  Patient will demonstrate an understanding of the medications intended purpose and possible adverse effects  Topic: [place] for Pharmacy Med Ed Group    Discipline Responsible:     OT  AT  [facility].  RT     X Other       Participation Level:     None  Minimal      X Active Listener    X Interactive    Monopolizing         Participation Quality:    X Appropriate  Inappropriate     X       Attentive        Intrusive          Sharing        Resistant          Supportive        Lethargic       Affective:     X Congruent  Incongruent  Blunted  Flat    Constricted  Anxious  Elated  Angry    Labile  Depressed  Other         Cognitive:    X Alert  Oriented PPTP     Concentration   X G  F  P   Attention Span   X G  F  P   Short-Term Memory   X G  F  P   Long-Term Memory  G  F  P   ProblemSolving/  Decision Making  G  F  P   Ability to Process  Information   X G  F  P      Contributing Factors             Delusional             Hallucinating             Flight of Ideas             Other:       Modes of Intervention:    X Education   X Support  Exploration    Clarifying  Problem Solving  Confrontation    Socialization  Limit Setting  Reality Testing    Activity  Movement  Media    Other:            Response to Learning:    X Able to verbalize current knowledge/experience    Able to verbalize/acknowledge new learning    Able to retain information    Capable of insight    Able to change behavior    Progressing to goal    Other:        Comments: Patient was listening and was appropriately interactive  Jorge JIMÉNEZ candidate 2021

## 2021-04-07 NOTE — ANESTHESIA PRE PROCEDURE
Department of Anesthesiology  Preprocedure Note       Name:  June Marr   Age:  64 y.o.  :  1960                                          MRN:  [id number]         Date:  2021      Surgeon: Leisa Haley  Procedure:ECT  Medications prior to admission:   Prior to Admission medications    Medication Sig Start Date End Date Taking?  Authorizing Provider   atorvastatin (LIPITOR) 10 MG tablet Take 20 mg by mouth daily    Yes Historical Provider, MD   busPIRone (BUSPAR) 15 MG tablet Take 15 mg by mouth 2 times daily 3/5/21  Yes Historical Provider, MD   omeprazole (PRILOSEC) 10 MG delayed release capsule Take 20 mg by mouth daily    Yes Historical Provider, MD   sertraline (ZOLOFT) 100 MG tablet Take 150 mg by mouth daily 20  Yes Historical Provider, MD       Current medications:    Current Facility-Administered Medications   Medication Dose Route Frequency Provider Last Rate Last Admin    0.9 % sodium chloride infusion   Intravenous Continuous Girma Marti MD        acetaminophen (TYLENOL) tablet 650 mg  650 mg Oral Q4H PRN Sameera Overton MD   650 mg at 21 0638    aluminum & magnesium hydroxide-simethicone (MAALOX) 200-200-20 MG/5ML suspension 30 mL  30 mL Oral Q6H PRN Sameera Overton MD        hydrOXYzine (ATARAX) tablet 50 mg  50 mg Oral TID PRN Sameera Overton MD   50 mg at 21 204    ibuprofen (ADVIL;MOTRIN) tablet 400 mg  400 mg Oral Q6H PRN Sameera Overton MD   400 mg at 21 0870    polyethylene glycol (GLYCOLAX) packet 17 g  17 g Oral Daily PRN Sameera Overton MD        traZODone (DESYREL) tablet 50 mg  50 mg Oral Nightly PRN Sameera Overton MD   50 mg at 21    nicotine polacrilex (NICORETTE) gum 2 mg  2 mg Oral PRN Sameera Overton MD        atorvastatin (LIPITOR) tablet 20 mg  20 mg Oral Daily iGrma Marti MD   20 mg at 21 0812    busPIRone (BUSPAR) tablet 15 mg  15 mg Oral BID Girma Marti MD   15 mg at 04/06/21 2119    omeprazole (PRILOSEC) delayed release capsule 20 mg  20 mg Oral Daily Genevieve De Leon MD   20 mg at 04/06/21 0670    sertraline (ZOLOFT) tablet 150 mg  150 mg Oral Daily Genevieve De Leon MD   150 mg at 04/06/21 4778    cloNIDine (CATAPRES) tablet 0.1 mg  0.1 mg Oral Nightly Genevieve De Leon MD   0.1 mg at 04/06/21 2119    lithium (LITHOBID) extended release tablet 300 mg  300 mg Oral Daily Genevieve De Leon MD   300 mg at 04/06/21 4130       Allergies:     Allergies   Allergen Reactions    Bupropion        Problem List:    Patient Active Problem List   Diagnosis Code    Major depression, single episode F32.9    Severe recurrent major depression with psychotic features ([facility] [address] F33.3       Past Medical History:        Diagnosis Date    Depression     Fracture of right wrist     GERD (gastroesophageal reflux disease)        Past Surgical History:        Procedure Laterality Date    COSMETIC SURGERY      face    EYE SURGERY Bilateral     lasik    FRACTURE SURGERY Right     wrist    WISDOM TOOTH EXTRACTION         Social History:    Social History     Tobacco Use    Smoking status: Former Smoker    Smokeless tobacco: Never Used   Substance Use Topics    Alcohol use: Yes     Comment: occas wine                                Counseling given: Not Answered      Vital Signs (Current):   Vitals:    04/06/21 0745 04/06/21 2030 04/07/21 0700 04/07/21 0713   BP: 99/61 112/71 99/66    Pulse: 109 62 66    Resp: 15 14 14    Temp: 98 °F (36.7 °C) 98.1 °F (36.7 °C) 98.1 °F (36.7 °C)    TempSrc: Temporal Oral Oral    SpO2:    97%   Weight:       Height:                                                  BP Readings from Last 3 Encounters:   04/07/21 99/66       NPO Status: Time of last liquid consumption: 2200                        Time of last solid consumption: 2200                        Date of last liquid consumption: 04/06/21                        Date of last solid food consumption: 04/06/21    BMI:   Wt Readings from Last 3 Encounters:   04/02/21 174 lb (78.9 kg)     Body mass index is 28.08 kg/m². CBC:   Lab Results   Component Value Date    WBC 5.9 04/05/2021    RBC 4.51 04/05/2021    HGB 13.9 04/05/2021    HCT 41.6 04/05/2021    MCV 92.3 04/05/2021    RDW 12.9 04/05/2021     04/05/2021       CMP:   Lab Results   Component Value Date     04/06/2021    K 4.6 04/06/2021     04/06/2021    CO2 27 04/06/2021    BUN 15 04/06/2021    CREATININE 1.08 04/06/2021    GFRAA >60 04/06/2021    LABGLOM 52 04/06/2021    GLUCOSE 110 04/06/2021    PROT 6.4 04/05/2021    CALCIUM 9.5 04/06/2021    BILITOT 0.29 04/05/2021    ALKPHOS 58 04/05/2021    AST 11 04/05/2021    ALT 15 04/05/2021       POC Tests: No results for input(s): POCGLU, POCNA, POCK, POCCL, POCBUN, POCHEMO, POCHCT in the last 72 hours. Coags: No results found for: PROTIME, INR, APTT    HCG (If Applicable): No results found for: PREGTESTUR, PREGSERUM, HCG, HCGQUANT     ABGs: No results found for: PHART, PO2ART, QVT1KOW, RQT1XJQ, BEART, A0AQQEHS     Type & Screen (If Applicable):  No results found for: LABABO, LABRH    Drug/Infectious Status (If Applicable):  No results found for: HIV, HEPCAB    COVID-19 Screening (If Applicable): No results found for: COVID19        Anesthesia Evaluation  Patient summary reviewed and Nursing notes reviewed no history of anesthetic complications:   Airway: Mallampati: I  TM distance: >3 FB   Neck ROM: full  Mouth opening: > = 3 FB Dental: normal exam         Pulmonary:Negative Pulmonary ROS and normal exam  breath sounds clear to auscultation      (-) pneumonia, COPD, asthma, shortness of breath, recent URI, sleep apnea, rhonchi, wheezes, rales, stridor and not a current smoker          Patient did not smoke on day of surgery.                  Cardiovascular:  Exercise tolerance: good (>4 METS),       (-) pacemaker, hypertension, valvular problems/murmurs, past MI, CAD, CABG/stent,

## 2021-04-07 NOTE — PLAN OF CARE
Problem: Altered Mood, Depressive Behavior:  Goal: Ability to disclose and discuss suicidal ideas will improve  Description: Ability to disclose and discuss suicidal ideas will improve  Outcome: Ongoing  Goal: Absence of self-harm  Description: Absence of self-harm  Outcome: Ongoing      Pt denies suicidal ideations at this time. Pt agreed to seek staff at anytime she felt like any urges to harm self would arise. Safety checks maintained [id number]. Pt remains free from self harm this shift. Pt denies wanting to cause harm to self or others at this time. Pt encouraged to seek nursing staff at anytime if she felt at danger to themselves or others. Pt states understanding. Safety checks maintained [id number]  Pt out social with peers denies SI/HI. Reports anxiety only about upcoming ECT tx.   Given info about procedure

## 2021-04-07 NOTE — PROGRESS NOTES
hygiene   Behavior/Motor: Pleasant, no psychomotor abnormalities noted  Attitude toward examiner:  Cooperative, attentive, good eye contact  Speech:  spontaneous, normal rate, normal volume and well articulated  Mood: Depressed  Affect: Blunted  Thought processes:  linear, goal directed and coherent  Thought content:  denies homicidal ideation  Suicidal Ideation: Endorses active suicidal ideation, verbally contracts for safety while on unit  Delusions:  no evidence of delusions  Perceptual Disturbance: Endorses improving auditory hallucinations  Cognition:  Oriented to person, place, time/date and situation  Memory: age appropriate  Insight & Judgement: improving  Medication side effects:  denies       Data   height is 5' 6\" (1.676 m) and weight is 174 lb (78.9 kg). Her oral temperature is 98.4 °F (36.9 °C). Her blood pressure is 120/70 and her pulse is 74. Her respiration is 14 and oxygen saturation is 94%.    Labs:   Admission on 04/02/2021   Component Date Value Ref Range Status    Ventricular Rate 04/04/2021 64  BPM Preliminary    Atrial Rate 04/04/2021 64  BPM Preliminary    P-R Interval 04/04/2021 144  ms Preliminary    QRS Duration 04/04/2021 104  ms Preliminary    Q-T Interval 04/04/2021 418  ms Preliminary    QTc Calculation (Bazett) 04/04/2021 431  ms Preliminary    P Axis 04/04/2021 28  degrees Preliminary    R Axis 04/04/2021 27  degrees Preliminary    T Axis 04/04/2021 27  degrees Preliminary    Glucose 04/05/2021 103* 70 - 99 mg/dL Final    BUN 04/05/2021 15  6 - 20 mg/dL Final    CREATININE 04/05/2021 1.07* 0.50 - 0.90 mg/dL Final    Bun/Cre Ratio 04/05/2021 NOT REPORTED  9 - 20 Final    Calcium 04/05/2021 9.2  8.6 - 10.4 mg/dL Final    Sodium 04/05/2021 139  135 - 144 mmol/L Final    Potassium 04/05/2021 5.1  3.7 - 5.3 mmol/L Final    Chloride 04/05/2021 105  98 - 107 mmol/L Final    CO2 04/05/2021 27  20 - 31 mmol/L Final    Anion Gap 04/05/2021 7* 9 - 17 mmol/L Final    Alkaline Phosphatase 04/05/2021 58  35 - 104 U/L Final    ALT 04/05/2021 15  5 - 33 U/L Final    AST 04/05/2021 11  <32 U/L Final    Total Bilirubin 04/05/2021 0.29* 0.3 - 1.2 mg/dL Final    Total Protein 04/05/2021 6.4  6.4 - 8.3 g/dL Final    Albumin 04/05/2021 4.3  3.5 - 5.2 g/dL Final    Albumin/Globulin Ratio 04/05/2021 NOT REPORTED  1.0 - 2.5 Final    GFR Non- 04/05/2021 53* >60 mL/min Final    GFR  04/05/2021 >60  >60 mL/min Final    GFR Comment 04/05/2021        Final    Comment: Average GFR for 52-63 years old:   80 mL/min/1.73sq m  Chronic Kidney Disease:   <60 mL/min/1.73sq m  Kidney failure:   <15 mL/min/1.73sq m              eGFR calculated using average adult body mass.  Additional eGFR calculator available at:        [employer]            GFR Staging 04/05/2021 NOT REPORTED   Final    WBC 04/05/2021 5.9  3.5 - 11.0 k/uL Final    RBC 04/05/2021 4.51  4.0 - 5.2 m/uL Final    Hemoglobin 04/05/2021 13.9  12.0 - 16.0 g/dL Final    Hematocrit 04/05/2021 41.6  36 - 46 % Final    MCV 04/05/2021 92.3  80 - 100 fL Final    MCH 04/05/2021 30.9  26 - 34 pg Final    MCHC 04/05/2021 33.4  31 - 37 g/dL Final    RDW 04/05/2021 12.9  11.5 - 14.9 % Final    Platelets 34/31/0390 283  150 - 450 k/uL Final    MPV 04/05/2021 7.4  6.0 - 12.0 fL Final    NRBC Automated 04/05/2021 NOT REPORTED  per 100 WBC Final    Differential Type 04/05/2021 NOT REPORTED   Final    Seg Neutrophils 04/05/2021 56  36 - 66 % Final    Lymphocytes 04/05/2021 34  24 - 44 % Final    Monocytes 04/05/2021 7  1 - 7 % Final    Eosinophils % 04/05/2021 2  0 - 4 % Final    Basophils 04/05/2021 1  0 - 2 % Final    Immature Granulocytes 04/05/2021 NOT REPORTED  0 % Final    Segs Absolute 04/05/2021 3.30  1.3 - 9.1 k/uL Final    Absolute Lymph # 04/05/2021 2.00  1.0 - 4.8 k/uL Final    Absolute Mono # 04/05/2021 0.40  0.1 - 1.3 k/uL Final    Absolute Eos # 04/05/2021 0. 10  0.0 - 0.4 k/uL Final    Basophils Absolute 04/05/2021 0.10  0.0 - 0.2 k/uL Final    Absolute Immature Granulocyte 04/05/2021 NOT REPORTED  0.00 - 0.30 k/uL Final    WBC Morphology 04/05/2021 NOT REPORTED   Final    RBC Morphology 04/05/2021 NOT REPORTED   Final    Platelet Estimate 18/80/4850 NOT REPORTED   Final    Glucose 04/05/2021 74  70 - 99 mg/dL Final    BUN 04/05/2021 14  6 - 20 mg/dL Final    CREATININE 04/05/2021 1.05* 0.50 - 0.90 mg/dL Final    Bun/Cre Ratio 04/05/2021 NOT REPORTED  9 - 20 Final    Calcium 04/05/2021 9.0  8.6 - 10.4 mg/dL Final    Sodium 04/05/2021 140  135 - 144 mmol/L Final    Potassium 04/05/2021 4.3  3.7 - 5.3 mmol/L Final    Chloride 04/05/2021 103  98 - 107 mmol/L Final    CO2 04/05/2021 27  20 - 31 mmol/L Final    Anion Gap 04/05/2021 10  9 - 17 mmol/L Final    GFR Non- 04/05/2021 54* >60 mL/min Final    GFR  04/05/2021 >60  >60 mL/min Final    GFR Comment 04/05/2021        Final    Comment: Average GFR for 52-63 years old:   80 mL/min/1.73sq m  Chronic Kidney Disease:   <60 mL/min/1.73sq m  Kidney failure:   <15 mL/min/1.73sq m              eGFR calculated using average adult body mass.  Additional eGFR calculator available at:        [employer]            GFR Staging 04/05/2021 NOT REPORTED   Final    Lithium Lvl 04/06/2021 0.2* 0.6 - 1.2 mmol/L Final    Lithium Dose Amount 04/06/2021 300   Final    Lithium Date Last Dose 04/06/2021 40,521   Final    Lithium Dose Time 04/06/2021 826   Final    Glucose 04/06/2021 110* 70 - 99 mg/dL Final    BUN 04/06/2021 15  6 - 20 mg/dL Final    CREATININE 04/06/2021 1.08* 0.50 - 0.90 mg/dL Final    Bun/Cre Ratio 04/06/2021 NOT REPORTED  9 - 20 Final    Calcium 04/06/2021 9.5  8.6 - 10.4 mg/dL Final    Sodium 04/06/2021 139  135 - 144 mmol/L Final    Potassium 04/06/2021 4.6  3.7 - 5.3 mmol/L Final    Chloride 04/06/2021 102  98 - 107 mmol/L Final    CO2 04/06/2021 27  20 - 31 mmol/L Final    Anion Gap 04/06/2021 10  9 - 17 mmol/L Final    GFR Non- 04/06/2021 52* >60 mL/min Final    GFR  04/06/2021 >60  >60 mL/min Final    GFR Comment 04/06/2021        Final    Comment: Average GFR for 52-63 years old:   80 mL/min/1.73sq m  Chronic Kidney Disease:   <60 mL/min/1.73sq m  Kidney failure:   <15 mL/min/1.73sq m              eGFR calculated using average adult body mass.  Additional eGFR calculator available at:        [email or web address]            GFR Staging 04/06/2021 NOT REPORTED   Final            Medications  Current Facility-Administered Medications: 0.9 % sodium chloride infusion, , Intravenous, Continuous  meperidine (DEMEROL) injection 12.5 mg, 12.5 mg, Intravenous, Q5 Min PRN  HYDROmorphone (DILAUDID) injection 0.5 mg, 0.5 mg, Intravenous, Q5 Min PRN  morphine (PF) injection 2 mg, 2 mg, Intravenous, Q5 Min PRN  ondansetron (ZOFRAN) injection 4 mg, 4 mg, Intravenous, Once PRN  diphenhydrAMINE (BENADRYL) injection 12.5 mg, 12.5 mg, Intravenous, Once PRN  labetalol (NORMODYNE;TRANDATE) injection 5 mg, 5 mg, Intravenous, Q10 Min PRN  succinylcholine (ANECTINE) 200 MG/10ML injection, , ,   acetaminophen (TYLENOL) tablet 650 mg, 650 mg, Oral, Q4H PRN  aluminum & magnesium hydroxide-simethicone (MAALOX) 200-200-20 MG/5ML suspension 30 mL, 30 mL, Oral, Q6H PRN  hydrOXYzine (ATARAX) tablet 50 mg, 50 mg, Oral, TID PRN  ibuprofen (ADVIL;MOTRIN) tablet 400 mg, 400 mg, Oral, Q6H PRN  polyethylene glycol (GLYCOLAX) packet 17 g, 17 g, Oral, Daily PRN  traZODone (DESYREL) tablet 50 mg, 50 mg, Oral, Nightly PRN  nicotine polacrilex (NICORETTE) gum 2 mg, 2 mg, Oral, PRN  atorvastatin (LIPITOR) tablet 20 mg, 20 mg, Oral, Daily  busPIRone (BUSPAR) tablet 15 mg, 15 mg, Oral, BID  omeprazole (PRILOSEC) delayed release capsule 20 mg, 20 mg, Oral, Daily  sertraline (ZOLOFT) tablet 150 mg, 150 mg, Oral, Daily  cloNIDine (CATAPRES) tablet 0.1 mg, 0.1 mg, Oral, Nightly  lithium (LITHOBID) extended release tablet 300 mg, 300 mg, Oral, Daily    ASSESSMENT  Severe recurrent major depression with psychotic features ([facility] [address]     PLAN  Discussed with Dr. Emma Muñoz and treatment team   Patients symptoms show some improvement  Continue current medication regimen  Monitor need and frequency of as needed medication  Encourage participation in groups and milieu  Continue ECT inpatient Monday Wednesday and Friday  Attempt to develop insight  Psycho-education conducted. Supportive Therapy conducted. Probable discharge is undetermined at this time-Patient requests at d/c lithium & catapres prescriptions are [facility] as she has sufficient supply of Buspar and Zoloft at home. Follow-up daily while in the inpatient unit    Electronically signed by BLADIMIR Dominguez CNP on 4/7/21 at 255 PM EDT    I independently saw and evaluated the patient. I reviewed the nurse practitioners documentation above. Any additional comments or changes to the nurse practitioners documentation are stated below otherwise agree with assessment. Plan will be as follows:  Spoke with patient in the afternoon. She tolerated ECT well. She would like to do her second ECT treatment on Friday. She states she is feeling a little groggy after ECT and wants to assess how she feels tomorrow. Discussed reassessing her suicidal ideations tomorrow and they are improved to reduce would likely discharge Friday after ECT inpatient agreeable with this plan. PLAN  Patient s symptoms   are improving  Continue with current course of treatment  Attempt to develop insight  Psycho-education conducted. Supportive Therapy conducted.   Probable discharge is likely Friday after ECT  Follow-up daily while on inpatient unit

## 2021-04-07 NOTE — GROUP NOTE
Group Therapy Note    Date: 4/7/2021    Group Start Time: 0900  Group End Time: 0920  Group Topic: Community Meeting    [facility]    Patient refused to attend community meeting and goal setting group at 0900 after encouragement from staff. 1:1 talk time offered by staff as alternative to group session.

## 2021-04-07 NOTE — ANESTHESIA POSTPROCEDURE EVALUATION
POST- ANESTHESIA EVALUATION       Pt Name: Lesley Sapp  MRN: [id number]  YOB: 1960  Date of evaluation: 4/7/2021  Time:  11:22 AM      /71   Pulse 74   Temp 98.4 °F (36.9 °C) (Oral)   Resp 16   Ht 5' 6\" (1.676 m)   Wt 174 lb (78.9 kg)   SpO2 94%   BMI 28.08 kg/m²      Consciousness Level  Awake  Cardiopulmonary Status  Stable  Pain Adequately Treated YES  Nausea / Vomiting  NO  Adequate Hydration  YES  Anesthesia Related Complications NONE      Electronically signed by Aminta Nice MD on 4/7/2021 at 11:22 AM       Department of Anesthesiology  Postprocedure Note    Patient: Lesley Sapp  MRN: [id number]  YOB: 1960  Date of evaluation: 4/7/2021  Time:  11:22 AM     Procedure Summary     Date: 04/07/21 Room / Location: [address] [place] [address] PACU    Anesthesia Start: 0802 Anesthesia Stop: 7238    Procedure: ECT W/ ANESTHESIA Diagnosis:     Scheduled Providers:  Responsible Provider: Aminta Nice MD    Anesthesia Type: general ASA Status: 2          Anesthesia Type: general    Tarik Phase I: Tarik Score: 10    Tarik Phase II:      Last vitals: Reviewed and per EMR flowsheets.        Anesthesia Post Evaluation

## 2021-04-08 ENCOUNTER — TELEPHONE (OUTPATIENT)
Dept: PSYCHIATRY | Age: 61
End: 2021-04-08

## 2021-04-08 DIAGNOSIS — F33.3 SEVERE RECURRENT MAJOR DEPRESSION WITH PSYCHOTIC FEATURES (HCC): Primary | ICD-10-CM

## 2021-04-08 PROCEDURE — 99232 SBSQ HOSP IP/OBS MODERATE 35: CPT | Performed by: PSYCHIATRY & NEUROLOGY

## 2021-04-08 PROCEDURE — 1240000000 HC EMOTIONAL WELLNESS R&B

## 2021-04-08 PROCEDURE — 6370000000 HC RX 637 (ALT 250 FOR IP): Performed by: PSYCHIATRY & NEUROLOGY

## 2021-04-08 PROCEDURE — 90833 PSYTX W PT W E/M 30 MIN: CPT | Performed by: PSYCHIATRY & NEUROLOGY

## 2021-04-08 RX ORDER — LITHIUM CARBONATE 300 MG/1
300 TABLET, FILM COATED, EXTENDED RELEASE ORAL DAILY
Qty: 60 TABLET | Refills: 3 | Status: SHIPPED | OUTPATIENT
Start: 2021-04-09

## 2021-04-08 RX ORDER — CLONIDINE HYDROCHLORIDE 0.1 MG/1
0.1 TABLET ORAL NIGHTLY
Qty: 60 TABLET | Refills: 3 | Status: SHIPPED | OUTPATIENT
Start: 2021-04-08

## 2021-04-08 RX ORDER — OMEPRAZOLE 20 MG/1
20 CAPSULE, DELAYED RELEASE ORAL DAILY
Qty: 30 CAPSULE | Refills: 3 | Status: SHIPPED | OUTPATIENT
Start: 2021-04-09

## 2021-04-08 RX ORDER — HYDROXYZINE 50 MG/1
50 TABLET, FILM COATED ORAL 3 TIMES DAILY PRN
Qty: 30 TABLET | Refills: 0 | Status: SHIPPED | OUTPATIENT
Start: 2021-04-08 | End: 2021-04-18

## 2021-04-08 RX ORDER — TRAZODONE HYDROCHLORIDE 50 MG/1
50 TABLET ORAL NIGHTLY PRN
Qty: 30 TABLET | Refills: 0 | Status: SHIPPED | OUTPATIENT
Start: 2021-04-08

## 2021-04-08 RX ORDER — ATORVASTATIN CALCIUM 10 MG/1
20 TABLET, FILM COATED ORAL DAILY
Qty: 30 TABLET | Refills: 0 | Status: SHIPPED | OUTPATIENT
Start: 2021-04-08

## 2021-04-08 RX ORDER — BUSPIRONE HYDROCHLORIDE 15 MG/1
15 TABLET ORAL 2 TIMES DAILY
Qty: 60 TABLET | Refills: 0 | Status: SHIPPED | OUTPATIENT
Start: 2021-04-08

## 2021-04-08 RX ADMIN — BUSPIRONE HYDROCHLORIDE 15 MG: 15 TABLET ORAL at 09:59

## 2021-04-08 RX ADMIN — ATORVASTATIN CALCIUM 20 MG: 20 TABLET, FILM COATED ORAL at 09:59

## 2021-04-08 RX ADMIN — CLONIDINE HYDROCHLORIDE 0.1 MG: 0.1 TABLET ORAL at 20:53

## 2021-04-08 RX ADMIN — TRAZODONE HYDROCHLORIDE 50 MG: 50 TABLET ORAL at 20:53

## 2021-04-08 RX ADMIN — OMEPRAZOLE 20 MG: 20 CAPSULE, DELAYED RELEASE ORAL at 09:59

## 2021-04-08 RX ADMIN — LITHIUM CARBONATE 300 MG: 300 TABLET, FILM COATED, EXTENDED RELEASE ORAL at 09:59

## 2021-04-08 RX ADMIN — HYDROXYZINE HYDROCHLORIDE 50 MG: 50 TABLET, FILM COATED ORAL at 20:53

## 2021-04-08 RX ADMIN — BUSPIRONE HYDROCHLORIDE 15 MG: 15 TABLET ORAL at 20:53

## 2021-04-08 RX ADMIN — SERTRALINE HYDROCHLORIDE 150 MG: 100 TABLET ORAL at 09:59

## 2021-04-08 RX ADMIN — IBUPROFEN 400 MG: 400 TABLET, FILM COATED ORAL at 10:01

## 2021-04-08 NOTE — GROUP NOTE
Group Therapy Note    Date: 4/8/2021    Group Start Time: 1100  Group End Time: 4502  Group Topic: Recreational    [facility]    Mehreen Hoang        Group Therapy Note    Attendees: 5/13       Patient's Goal:  Patients engaged in [place] or Disagree\" group, where they expressed and shared their preferreences and thoughts on a variety of ideas as presented by this writer such as; \"it is never okay to lie\" \"a hotdog is a sandwich\" \"breakfast is the most important meal of the day\" and more. Goals to practice healthy communication and conflict resolution skill in low-risk scenario's; increase socialization; increase sense of community; practice limit setting and following limits; increase self-expression    Notes:  Patient attended and participated in group,having positive interactions with peers and staff. Appropriately engaged in conversation and able to follow along with topics. Status After Intervention:  Improved    Participation Level:  Active Listener and Interactive    Participation Quality: Appropriate, Attentive and Sharing      Speech:  normal      Thought Process/Content: Logical  Linear      Affective Functioning: Congruent      Mood: euthymic      Level of consciousness:  Alert and Attentive      Response to Learning: Able to verbalize current knowledge/experience, Able to verbalize/acknowledge new learning, Able to retain information, Capable of insight and Progressing to goal      Endings: None Reported    Modes of Intervention: Education, Support, Socialization, Exploration, Problem-solving, Confrontation, Limit-setting and Reality-testing      Discipline Responsible: Psychoeducational Specialist      Signature:  Mehreen Hoang

## 2021-04-08 NOTE — TELEPHONE ENCOUNTER
Patient is approved for 6 outpatient ECT treatments beginning 4/12/2021- 4/12/2022. Can request another auth if additional ECTs are required.

## 2021-04-08 NOTE — PLAN OF CARE
Problem: Altered Mood, Depressive Behavior:  Goal: Ability to disclose and discuss suicidal ideas will improve  Description: Ability to disclose and discuss suicidal ideas will improve       Problem: Altered Mood, Depressive Behavior:  Goal: Absence of self-harm  Description: Absence of self-harm     Pt denies suicidal ideations at this time. Pt agreed to seek staff at anytime she felt like any urges to harm self would arise. Safety checks maintained [id number]. Pt remains free from self harm this shift. Pt denies wanting to cause harm to self or others at this time. Pt encouraged to seek nursing staff at anytime if she felt at danger to themselves or others. Pt states understanding. Safety checks maintained [id number]    Pt out social with peers, denies SI/HI.

## 2021-04-08 NOTE — PLAN OF CARE
Problem: Altered Mood, Depressive Behavior:  Goal: Ability to disclose and discuss suicidal ideas will improve  Description: Ability to disclose and discuss suicidal ideas will improve  Outcome: Ongoing   Patient denies homicidal and suicidal ideations. Patient attends some groups but is mostly isolative to her room. Q 15 safety checks. Problem: Altered Mood, Depressive Behavior:  Goal: Absence of self-harm  Description: Absence of self-harm  Outcome: Ongoing   Patient remains free from self harm. Environment is checked for safety. Q 15 patient safety checks.      Problem: Tobacco Use:  Goal: Inpatient tobacco use cessation counseling participation  Description: Inpatient tobacco use cessation counseling participation  Outcome: Ongoing

## 2021-04-08 NOTE — PROGRESS NOTES
CLINICAL PHARMACY NOTE: MEDS TO [address] Select Patient?: No  Total # of Prescriptions Filled: 6   The following medications were delivered to the patient:  · Atorvastatin  · Lithium,   · Clonidine  · Hydroxyzine  · trazodone  · Sertraline  ·   Total # of Interventions Completed: 0  Time Spent (min): 30    Additional Documentation:

## 2021-04-08 NOTE — PROGRESS NOTES
Behavioral Services                                              Medicare Re-Certification    I certify that the inpatient psychiatric hospital services furnished since the previous certification/re-certification were, and continue to be, medically necessary for;    [x] (1) Treatment which could reasonably be expected to improve the patient's condition,    [x] (2) Or for diagnostic study. Estimated length of stay/service 1-2 days    Plan for post-hospital care Home with outpatient community mental health follow-up as well as transition to outpatient ECT    This patient continues to need, on a daily basis, active treatment furnished directly by or requiring the supervision of inpatient psychiatric personnel.     Electronically signed by Tammy Canales MD on 4/8/2021 at 2:19 PM

## 2021-04-08 NOTE — PROGRESS NOTES
Daily Progress Note  4/8/2021        CHIEF COMPLAINT: Depression with suicidal ideation    Reviewed patient's current plan of care and vital signs with nursing staff. Sleep: Reports sleep is \"fair\". Attending groups: Yes, selectively    SUBJECTIVE:    Staff reports no overnight events, patient remains medication compliant. She received her first ECT treatment yesterday, she reports that she tolerated it well with no significant side effects. She reports that she was tired afterwards and felt that she had increased urinary frequency secondary to the fluid she received. She reports no significant change in her mood, she remains depressed with frequent \"negative thoughts\". She reports that she did have some difficulty falling asleep last night because she kept \"over thinking\". She reports that she is hopeful that ECT will help diminish the negative thinking. She denies any cognitive side effects from yesterday's treatment. She did provide contact information for her , we will meet today with social work to discuss a potential discharge plan and likely discharge home after ECT tomorrow. She remains able to verbally contract for safety though she does continue to have active suicidal thoughts wishing \"God would just take me\".     Mental Status Exam  Level of consciousness:  Awake and alert  Appearance: Personal attire, laying in bed, with good grooming and hygiene   Behavior/Motor: Pleasant, no psychomotor abnormalities noted  Attitude toward examiner:  Cooperative, attentive, good eye contact  Speech:  spontaneous, normal rate, normal volume and well articulated  Mood: Depressed  Affect: Blunted  Thought processes:  linear, goal directed and coherent  Thought content:  denies homicidal ideation  Suicidal Ideation: Endorses active suicidal ideation, verbally contracts for safety while on unit  Delusions:  no evidence of delusions  Perceptual Disturbance: Endorses improving auditory hallucinations, describes it as \"negative voice in the back of her head\"  Cognition:  Oriented to person, place, time/date and situation  Memory: age appropriate  Insight & Judgement: improving  Medication side effects:  denies       Data   height is 5' 6\" (1.676 m) and weight is 174 lb (78.9 kg). Her temporal temperature is 97.8 °F (36.6 °C). Her blood pressure is 120/77 and her pulse is 87. Her respiration is 14 and oxygen saturation is 94%.    Labs:   Admission on 04/02/2021   Component Date Value Ref Range Status    Ventricular Rate 04/04/2021 64  BPM Preliminary    Atrial Rate 04/04/2021 64  BPM Preliminary    P-R Interval 04/04/2021 144  ms Preliminary    QRS Duration 04/04/2021 104  ms Preliminary    Q-T Interval 04/04/2021 418  ms Preliminary    QTc Calculation (Bazett) 04/04/2021 431  ms Preliminary    P Axis 04/04/2021 28  degrees Preliminary    R Axis 04/04/2021 27  degrees Preliminary    T Axis 04/04/2021 27  degrees Preliminary    Glucose 04/05/2021 103* 70 - 99 mg/dL Final    BUN 04/05/2021 15  6 - 20 mg/dL Final    CREATININE 04/05/2021 1.07* 0.50 - 0.90 mg/dL Final    Bun/Cre Ratio 04/05/2021 NOT REPORTED  9 - 20 Final    Calcium 04/05/2021 9.2  8.6 - 10.4 mg/dL Final    Sodium 04/05/2021 139  135 - 144 mmol/L Final    Potassium 04/05/2021 5.1  3.7 - 5.3 mmol/L Final    Chloride 04/05/2021 105  98 - 107 mmol/L Final    CO2 04/05/2021 27  20 - 31 mmol/L Final    Anion Gap 04/05/2021 7* 9 - 17 mmol/L Final    Alkaline Phosphatase 04/05/2021 58  35 - 104 U/L Final    ALT 04/05/2021 15  5 - 33 U/L Final    AST 04/05/2021 11  <32 U/L Final    Total Bilirubin 04/05/2021 0.29* 0.3 - 1.2 mg/dL Final    Total Protein 04/05/2021 6.4  6.4 - 8.3 g/dL Final    Albumin 04/05/2021 4.3  3.5 - 5.2 g/dL Final    Albumin/Globulin Ratio 04/05/2021 NOT REPORTED  1.0 - 2.5 Final    GFR Non- 04/05/2021 53* >60 mL/min Final    GFR  04/05/2021 >60  >60 mL/min Final    GFR Comment 04/05/2021        Final    Comment: Average GFR for 52-63 years old:   80 mL/min/1.73sq m  Chronic Kidney Disease:   <60 mL/min/1.73sq m  Kidney failure:   <15 mL/min/1.73sq m              eGFR calculated using average adult body mass.  Additional eGFR calculator available at:        [employer]            GFR Staging 04/05/2021 NOT REPORTED   Final    WBC 04/05/2021 5.9  3.5 - 11.0 k/uL Final    RBC 04/05/2021 4.51  4.0 - 5.2 m/uL Final    Hemoglobin 04/05/2021 13.9  12.0 - 16.0 g/dL Final    Hematocrit 04/05/2021 41.6  36 - 46 % Final    MCV 04/05/2021 92.3  80 - 100 fL Final    MCH 04/05/2021 30.9  26 - 34 pg Final    MCHC 04/05/2021 33.4  31 - 37 g/dL Final    RDW 04/05/2021 12.9  11.5 - 14.9 % Final    Platelets 13/71/1636 283  150 - 450 k/uL Final    MPV 04/05/2021 7.4  6.0 - 12.0 fL Final    NRBC Automated 04/05/2021 NOT REPORTED  per 100 WBC Final    Differential Type 04/05/2021 NOT REPORTED   Final    Seg Neutrophils 04/05/2021 56  36 - 66 % Final    Lymphocytes 04/05/2021 34  24 - 44 % Final    Monocytes 04/05/2021 7  1 - 7 % Final    Eosinophils % 04/05/2021 2  0 - 4 % Final    Basophils 04/05/2021 1  0 - 2 % Final    Immature Granulocytes 04/05/2021 NOT REPORTED  0 % Final    Segs Absolute 04/05/2021 3.30  1.3 - 9.1 k/uL Final    Absolute Lymph # 04/05/2021 2.00  1.0 - 4.8 k/uL Final    Absolute Mono # 04/05/2021 0.40  0.1 - 1.3 k/uL Final    Absolute Eos # 04/05/2021 0.10  0.0 - 0.4 k/uL Final    Basophils Absolute 04/05/2021 0.10  0.0 - 0.2 k/uL Final    Absolute Immature Granulocyte 04/05/2021 NOT REPORTED  0.00 - 0.30 k/uL Final    WBC Morphology 04/05/2021 NOT REPORTED   Final    RBC Morphology 04/05/2021 NOT REPORTED   Final    Platelet Estimate 23/37/3398 NOT REPORTED   Final    Glucose 04/05/2021 74  70 - 99 mg/dL Final    BUN 04/05/2021 14  6 - 20 mg/dL Final    CREATININE 04/05/2021 1.05* 0.50 - 0.90 mg/dL Final    Bun/Cre Ratio 04/05/2021 NOT REPORTED  9 - 20 Final    Calcium 04/05/2021 9.0  8.6 - 10.4 mg/dL Final    Sodium 04/05/2021 140  135 - 144 mmol/L Final    Potassium 04/05/2021 4.3  3.7 - 5.3 mmol/L Final    Chloride 04/05/2021 103  98 - 107 mmol/L Final    CO2 04/05/2021 27  20 - 31 mmol/L Final    Anion Gap 04/05/2021 10  9 - 17 mmol/L Final    GFR Non- 04/05/2021 54* >60 mL/min Final    GFR  04/05/2021 >60  >60 mL/min Final    GFR Comment 04/05/2021        Final    Comment: Average GFR for 52-63 years old:   80 mL/min/1.73sq m  Chronic Kidney Disease:   <60 mL/min/1.73sq m  Kidney failure:   <15 mL/min/1.73sq m              eGFR calculated using average adult body mass. Additional eGFR calculator available at:        [email or web address]            GFR Staging 04/05/2021 NOT REPORTED   Final    Lithium Lvl 04/06/2021 0.2* 0.6 - 1.2 mmol/L Final    Lithium Dose Amount 04/06/2021 300   Final    Lithium Date Last Dose 04/06/2021 40,521   Final    Lithium Dose Time 04/06/2021 826   Final    Glucose 04/06/2021 110* 70 - 99 mg/dL Final    BUN 04/06/2021 15  6 - 20 mg/dL Final    CREATININE 04/06/2021 1.08* 0.50 - 0.90 mg/dL Final    Bun/Cre Ratio 04/06/2021 NOT REPORTED  9 - 20 Final    Calcium 04/06/2021 9.5  8.6 - 10.4 mg/dL Final    Sodium 04/06/2021 139  135 - 144 mmol/L Final    Potassium 04/06/2021 4.6  3.7 - 5.3 mmol/L Final    Chloride 04/06/2021 102  98 - 107 mmol/L Final    CO2 04/06/2021 27  20 - 31 mmol/L Final    Anion Gap 04/06/2021 10  9 - 17 mmol/L Final    GFR Non- 04/06/2021 52* >60 mL/min Final    GFR  04/06/2021 >60  >60 mL/min Final    GFR Comment 04/06/2021        Final    Comment: Average GFR for 52-63 years old:   80 mL/min/1.73sq m  Chronic Kidney Disease:   <60 mL/min/1.73sq m  Kidney failure:   <15 mL/min/1.73sq m              eGFR calculated using average adult body mass.  Additional eGFR calculator available at:        [employer]            GFR Staging 04/06/2021 NOT REPORTED   Final            Medications  Current Facility-Administered Medications: 0.9 % sodium chloride infusion, , Intravenous, Continuous  meperidine (DEMEROL) injection 12.5 mg, 12.5 mg, Intravenous, Q5 Min PRN  HYDROmorphone (DILAUDID) injection 0.5 mg, 0.5 mg, Intravenous, Q5 Min PRN  morphine (PF) injection 2 mg, 2 mg, Intravenous, Q5 Min PRN  labetalol (NORMODYNE;TRANDATE) injection 5 mg, 5 mg, Intravenous, Q10 Min PRN  acetaminophen (TYLENOL) tablet 650 mg, 650 mg, Oral, Q4H PRN  aluminum & magnesium hydroxide-simethicone (MAALOX) 200-200-20 MG/5ML suspension 30 mL, 30 mL, Oral, Q6H PRN  hydrOXYzine (ATARAX) tablet 50 mg, 50 mg, Oral, TID PRN  ibuprofen (ADVIL;MOTRIN) tablet 400 mg, 400 mg, Oral, Q6H PRN  polyethylene glycol (GLYCOLAX) packet 17 g, 17 g, Oral, Daily PRN  traZODone (DESYREL) tablet 50 mg, 50 mg, Oral, Nightly PRN  nicotine polacrilex (NICORETTE) gum 2 mg, 2 mg, Oral, PRN  atorvastatin (LIPITOR) tablet 20 mg, 20 mg, Oral, Daily  busPIRone (BUSPAR) tablet 15 mg, 15 mg, Oral, BID  omeprazole (PRILOSEC) delayed release capsule 20 mg, 20 mg, Oral, Daily  sertraline (ZOLOFT) tablet 150 mg, 150 mg, Oral, Daily  cloNIDine (CATAPRES) tablet 0.1 mg, 0.1 mg, Oral, Nightly  lithium (LITHOBID) extended release tablet 300 mg, 300 mg, Oral, Daily    ASSESSMENT  Severe recurrent major depression with psychotic features (HCC)     PLAN  Discussed with Dr. Bruna Bass and treatment team   Patients symptoms show minimal improvement  Continue current medication regimen  Monitor need and frequency of as needed medication  Encourage participation in groups and milieu  Continue index ECT course, next treatment tomorrow  Attempt to develop insight  Psycho-education conducted. Supportive Therapy conducted.   Probable discharge is likely tomorrow after ECT with establish safety plan  We will transition her to outpatient ECT, will conduct family meeting today to discuss outpatient requirements  follow-up daily while in the inpatient unit    I independently saw and evaluated the patient. I reviewed the nurse practitioners documentation above. Any additional comments or changes to the nurse practitioners documentation are stated below otherwise agree with assessment. Plan will be as follows:  Spent 30 minutes with the patient, of that greater than 16 minutes spent in supportive psychotherapy. Patient does report improving suicidal ideations. Still reporting that she would be okay if she did not wake up or that if God took her home but denying any active intent or plan. She is denying any significant side effects to the ECT. Reports feeling comfortable with the plan of discharging home after tomorrow's ECT to transition to outpatient ECT as long as she remains free of any suicidal intent or plan. Discussed other stressors in her life such as filing for bankruptcy. PLAN  Patient s symptoms   are improving  Continue with current medications  Continue with ECT  Transition to outpatient ECT planned after tomorrow's treatment  Attempt to develop insight  Psycho-education conducted. Supportive Therapy conducted.   Probable discharge is tomorrow  Follow-up daily while on inpatient unit

## 2021-04-08 NOTE — GROUP NOTE
Group Therapy Note    Date: 4/8/2021    Group Start Time: 1600  Group End Time: 36  Group Topic: Healthy Living/Wellness    MARCOS CARRASCO    Abdullahi Crockett RN        Group Therapy Note    Attendees: 10/12         Patient's Goal:  Learn benefits of smoking cessation    Notes:  Handout and discussion    Status After Intervention:  Unchanged    Participation Level:  Active Listener    Participation Quality: Appropriate      Speech:  normal      Thought Process/Content: Logical      Affective Functioning: Congruent      Mood: euthymic      Level of consciousness:  Alert      Response to Learning: Able to verbalize current knowledge/experience      Endings: None Reported    Modes of Intervention: Education      Discipline Responsible: Registered Nurse      Signature:  Abdullahi Crockett RN

## 2021-04-08 NOTE — GROUP NOTE
Group Therapy Note    Date: 4/8/2021    Group Start Time: 0900  Group End Time: 0915  Group Topic: Community Meeting    MARCOS CARRASCO    Dony Jamison        Group Therapy Note    Attendees: 3/11         Patient's Goal:  To orient to unit and set a daily goal     Notes:  Patient attended and participated in group. Daily goal: finish a book she has been reading, and D/C planning for tomorrow    Status After Intervention:  Improved    Participation Level:  Active Listener and Interactive    Participation Quality: Appropriate, Attentive and Sharing      Speech:  normal      Thought Process/Content: Logical  Linear      Affective Functioning: Congruent      Mood: euthymic      Level of consciousness:  Alert and Attentive      Response to Learning: Able to verbalize current knowledge/experience and Progressing to goal      Endings: None Reported    Modes of Intervention: Education, Support, Socialization, Exploration, Limit-setting and Reality-testing      Discipline Responsible: Psychoeducational Specialist      Signature:  Dony Jamison

## 2021-04-09 ENCOUNTER — ANESTHESIA EVENT (OUTPATIENT)
Dept: POSTOP/PACU | Age: 61
End: 2021-04-09

## 2021-04-09 ENCOUNTER — ANESTHESIA (OUTPATIENT)
Dept: POSTOP/PACU | Age: 61
End: 2021-04-09

## 2021-04-09 ENCOUNTER — APPOINTMENT (OUTPATIENT)
Dept: POSTOP/PACU | Age: 61
End: 2021-04-09
Payer: COMMERCIAL

## 2021-04-09 VITALS — OXYGEN SATURATION: 99 % | TEMPERATURE: 98.6 F

## 2021-04-09 VITALS
HEIGHT: 66 IN | HEART RATE: 76 BPM | BODY MASS INDEX: 27.97 KG/M2 | WEIGHT: 174 LBS | DIASTOLIC BLOOD PRESSURE: 87 MMHG | OXYGEN SATURATION: 96 % | RESPIRATION RATE: 15 BRPM | SYSTOLIC BLOOD PRESSURE: 119 MMHG | TEMPERATURE: 97.6 F

## 2021-04-09 DIAGNOSIS — Z01.818 PREOPERATIVE CLEARANCE: ICD-10-CM

## 2021-04-09 DIAGNOSIS — Z01.818 PREOPERATIVE CLEARANCE: Primary | ICD-10-CM

## 2021-04-09 LAB
INFLUENZA A: NEGATIVE
INFLUENZA B: NEGATIVE
SARS-COV-2 RNA, RT PCR: NOT DETECTED
SOURCE: NORMAL
SPECIMEN DESCRIPTION: NORMAL

## 2021-04-09 PROCEDURE — 6370000000 HC RX 637 (ALT 250 FOR IP): Performed by: PSYCHIATRY & NEUROLOGY

## 2021-04-09 PROCEDURE — 3700000000 HC ANESTHESIA ATTENDED CARE

## 2021-04-09 PROCEDURE — 87636 SARSCOV2 & INF A&B AMP PRB: CPT

## 2021-04-09 PROCEDURE — 2580000003 HC RX 258: Performed by: PSYCHIATRY & NEUROLOGY

## 2021-04-09 PROCEDURE — 99239 HOSP IP/OBS DSCHRG MGMT >30: CPT | Performed by: PSYCHIATRY & NEUROLOGY

## 2021-04-09 PROCEDURE — 7100000001 HC PACU RECOVERY - ADDTL 15 MIN

## 2021-04-09 PROCEDURE — 90870 ELECTROCONVULSIVE THERAPY: CPT | Performed by: PSYCHIATRY & NEUROLOGY

## 2021-04-09 PROCEDURE — 90870 ELECTROCONVULSIVE THERAPY: CPT

## 2021-04-09 PROCEDURE — 2500000003 HC RX 250 WO HCPCS: Performed by: NURSE ANESTHETIST, CERTIFIED REGISTERED

## 2021-04-09 PROCEDURE — 6360000002 HC RX W HCPCS: Performed by: NURSE ANESTHETIST, CERTIFIED REGISTERED

## 2021-04-09 PROCEDURE — 7100000000 HC PACU RECOVERY - FIRST 15 MIN

## 2021-04-09 RX ORDER — PROMETHAZINE HYDROCHLORIDE 25 MG/ML
6.25 INJECTION, SOLUTION INTRAMUSCULAR; INTRAVENOUS
Status: DISCONTINUED | OUTPATIENT
Start: 2021-04-09 | End: 2021-04-09 | Stop reason: CLARIF

## 2021-04-09 RX ORDER — HYDRALAZINE HYDROCHLORIDE 20 MG/ML
5 INJECTION INTRAMUSCULAR; INTRAVENOUS EVERY 10 MIN PRN
Status: DISCONTINUED | OUTPATIENT
Start: 2021-04-09 | End: 2021-04-09 | Stop reason: HOSPADM

## 2021-04-09 RX ORDER — SUCCINYLCHOLINE CHLORIDE 20 MG/ML
INJECTION INTRAMUSCULAR; INTRAVENOUS PRN
Status: DISCONTINUED | OUTPATIENT
Start: 2021-04-09 | End: 2021-04-09 | Stop reason: SDUPTHER

## 2021-04-09 RX ORDER — SUCCINYLCHOLINE/SOD CL,ISO/PF 200MG/10ML
SYRINGE (ML) INTRAVENOUS
Status: DISCONTINUED
Start: 2021-04-09 | End: 2021-04-09 | Stop reason: HOSPADM

## 2021-04-09 RX ORDER — DIPHENHYDRAMINE HYDROCHLORIDE 50 MG/ML
12.5 INJECTION INTRAMUSCULAR; INTRAVENOUS
Status: DISCONTINUED | OUTPATIENT
Start: 2021-04-09 | End: 2021-04-09 | Stop reason: HOSPADM

## 2021-04-09 RX ORDER — 0.9 % SODIUM CHLORIDE 0.9 %
500 INTRAVENOUS SOLUTION INTRAVENOUS
Status: DISCONTINUED | OUTPATIENT
Start: 2021-04-09 | End: 2021-04-09 | Stop reason: HOSPADM

## 2021-04-09 RX ORDER — ONDANSETRON 2 MG/ML
4 INJECTION INTRAMUSCULAR; INTRAVENOUS
Status: DISCONTINUED | OUTPATIENT
Start: 2021-04-09 | End: 2021-04-09 | Stop reason: HOSPADM

## 2021-04-09 RX ADMIN — SERTRALINE HYDROCHLORIDE 150 MG: 100 TABLET ORAL at 08:35

## 2021-04-09 RX ADMIN — Medication 80 MG: at 07:26

## 2021-04-09 RX ADMIN — OMEPRAZOLE 20 MG: 20 CAPSULE, DELAYED RELEASE ORAL at 08:36

## 2021-04-09 RX ADMIN — LITHIUM CARBONATE 300 MG: 300 TABLET, FILM COATED, EXTENDED RELEASE ORAL at 08:36

## 2021-04-09 RX ADMIN — SUCCINYLCHOLINE CHLORIDE 80 MG: 20 INJECTION, SOLUTION INTRAMUSCULAR; INTRAVENOUS at 07:26

## 2021-04-09 RX ADMIN — SODIUM CHLORIDE: 9 INJECTION, SOLUTION INTRAVENOUS at 07:22

## 2021-04-09 RX ADMIN — ACETAMINOPHEN 650 MG: 325 TABLET ORAL at 06:08

## 2021-04-09 RX ADMIN — BUSPIRONE HYDROCHLORIDE 15 MG: 15 TABLET ORAL at 08:36

## 2021-04-09 RX ADMIN — SODIUM CHLORIDE: 9 INJECTION, SOLUTION INTRAVENOUS at 06:48

## 2021-04-09 RX ADMIN — ATORVASTATIN CALCIUM 20 MG: 20 TABLET, FILM COATED ORAL at 08:36

## 2021-04-09 ASSESSMENT — PAIN SCALES - GENERAL
PAINLEVEL_OUTOF10: 0

## 2021-04-09 NOTE — GROUP NOTE
Group Therapy Note    Date: 4/9/2021    Group Start Time: 1100  Group End Time: 2415  Group Topic: Recreational    [address], [facility]        Group Therapy Note    Attendees: 5/12         Patient's Goal:  Identifying positive coping skills through recreation and leisure. Status After Intervention:  Improved    Participation Level:  Active Listener and Interactive    Participation Quality: Appropriate, Attentive, Sharing and Supportive      Speech:  normal      Thought Process/Content: Logical      Affective Functioning: Congruent      Mood: euphoric      Level of consciousness:  Alert, Oriented x4 and Attentive      Response to Learning: Able to verbalize current knowledge/experience, Able to verbalize/acknowledge new learning, Able to retain information and Capable of insight      Modes of Intervention: Education, Support, Socialization, Exploration, Clarifying and Problem-solving      Discipline Responsible: Psychoeducational Specialist      Signature:  Terrence Marti

## 2021-04-09 NOTE — ANESTHESIA PRE PROCEDURE
Department of Anesthesiology  Preprocedure Note       Name:  Stu Sosa   Age:  64 y.o.  :  1960                                          MRN:  [id number]         Date:  2021      Surgeon: * No surgeons listed * Dr. Darline Hernandez    Procedure: * No procedures listed * ECT    Diagnosis: Major depressive disorder with psychotic features    Medications prior to admission:   Prior to Admission medications    Medication Sig Start Date End Date Taking?  Authorizing Provider   atorvastatin (LIPITOR) 10 MG tablet Take 2 tablets by mouth daily 21  Yes Al Leo MD   busPIRone (BUSPAR) 15 MG tablet Take 15 mg by mouth 2 times daily 21  Yes Al Leo MD   hydrOXYzine (ATARAX) 50 MG tablet Take 1 tablet by mouth 3 times daily as needed for Anxiety 21 Yes Al Leo MD   cloNIDine (CATAPRES) 0.1 MG tablet Take 1 tablet by mouth nightly 21  Yes Al Leo MD   lithium (LITHOBID) 300 MG extended release tablet Take 1 tablet by mouth daily 21  Yes Al Leo MD   omeprazole (PRILOSEC) 20 MG delayed release capsule Take 1 capsule by mouth daily 21  Yes Al Leo MD   sertraline (ZOLOFT) 50 MG tablet Take 3 tablets by mouth daily 21  Yes Al Leo MD   traZODone (DESYREL) 50 MG tablet Take 1 tablet by mouth nightly as needed for Sleep 21  Yes Al Leo MD       Current medications:    Current Facility-Administered Medications   Medication Dose Route Frequency Provider Last Rate Last Admin    0.9 % sodium chloride infusion   Intravenous Continuous Al Leo  mL/hr at 21 0648 New Bag at 21 0648    meperidine (DEMEROL) injection 12.5 mg  12.5 mg Intravenous Q5 Min PRN Marc Molina MD        HYDROmorphone (DILAUDID) injection 0.5 mg  0.5 mg Intravenous Q5 Min PRN Marc Molina MD        morphine (PF) injection 2 mg  2 mg Intravenous Q5 Min PRN Marc Molina MD        labetalol (NORMODYNE;TRANDATE) injection 5 mg  5 mg Intravenous Q10 Min PRN German Regalado MD        acetaminophen (TYLENOL) tablet 650 mg  650 mg Oral Q4H PRN Hermes Rice MD   650 mg at 04/09/21 0608    aluminum & magnesium hydroxide-simethicone (MAALOX) 200-200-20 MG/5ML suspension 30 mL  30 mL Oral Q6H PRN Hermes Rice MD        hydrOXYzine (ATARAX) tablet 50 mg  50 mg Oral TID PRN Hermes Rice MD   50 mg at 04/08/21 2053    ibuprofen (ADVIL;MOTRIN) tablet 400 mg  400 mg Oral Q6H PRN Hermes Rice MD   400 mg at 04/08/21 1001    polyethylene glycol (GLYCOLAX) packet 17 g  17 g Oral Daily PRN Hermes Rice MD        traZODone (DESYREL) tablet 50 mg  50 mg Oral Nightly PRN Hermes Rice MD   50 mg at 04/08/21 2053    nicotine polacrilex (NICORETTE) gum 2 mg  2 mg Oral PRN Hermes Rice MD        atorvastatin (LIPITOR) tablet 20 mg  20 mg Oral Daily Verona Shin MD   20 mg at 04/08/21 0959    busPIRone (BUSPAR) tablet 15 mg  15 mg Oral BID Verona Shin MD   15 mg at 04/08/21 2053    omeprazole (PRILOSEC) delayed release capsule 20 mg  20 mg Oral Daily Verona Shin MD   20 mg at 04/08/21 0959    sertraline (ZOLOFT) tablet 150 mg  150 mg Oral Daily Verona Shin MD   150 mg at 04/08/21 0959    cloNIDine (CATAPRES) tablet 0.1 mg  0.1 mg Oral Nightly Verona Shin MD   0.1 mg at 04/08/21 2053    lithium (LITHOBID) extended release tablet 300 mg  300 mg Oral Daily Verona Shin MD   300 mg at 04/08/21 0959       Allergies:     Allergies   Allergen Reactions    Bupropion        Problem List:    Patient Active Problem List   Diagnosis Code    Major depression, single episode F32.9    Severe recurrent major depression with psychotic features ([facility] [address] F33.3       Past Medical History:        Diagnosis Date    Depression     Fracture of right wrist     GERD (gastroesophageal reflux disease)        Past Surgical History:        Procedure INR, APTT    HCG (If Applicable): No results found for: PREGTESTUR, PREGSERUM, HCG, HCGQUANT     ABGs: No results found for: PHART, PO2ART, WST9FUW, DOQ4ZEN, BEART, P5MAXZLF     Type & Screen (If Applicable):  No results found for: LABABO, LABRH    Drug/Infectious Status (If Applicable):  No results found for: HIV, HEPCAB    COVID-19 Screening (If Applicable): No results found for: COVID19        Anesthesia Evaluation  Patient summary reviewed and Nursing notes reviewed no history of anesthetic complications:   Airway: Mallampati: II  TM distance: >3 FB   Neck ROM: full  Mouth opening: > = 3 FB Dental: normal exam         Pulmonary:Negative Pulmonary ROS and normal exam  breath sounds clear to auscultation                             Cardiovascular:    (+) hyperlipidemia        Rhythm: regular  Rate: normal                    Neuro/Psych:   (+) psychiatric history:depression/anxiety             GI/Hepatic/Renal:   (+) GERD:,           Endo/Other: Negative Endo/Other ROS                    Abdominal:           Vascular: negative vascular ROS. Anesthesia Plan      general     ASA 3       Induction: intravenous. MIPS: Prophylactic antiemetics administered. Anesthetic plan and risks discussed with patient. Plan discussed with CRNA.                   Margot Sahu MD   4/9/2021
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
## 2021-04-09 NOTE — ANESTHESIA POSTPROCEDURE EVALUATION
Department of Anesthesiology  Postprocedure Note    Patient: Omi Silva  MRN: [id number]  YOB: 1960  Date of evaluation: 4/9/2021  Time:  8:35 AM     Procedure Summary     Date: 04/09/21 Room / Location: [facility] PACU    Anesthesia Start: 9534 Anesthesia Stop: 9624    Procedure: ECT W/ ANESTHESIA Diagnosis: Major depressive disorder, recurrent, severe with psychotic symptoms    Scheduled Providers:  Responsible Provider: Sarah Muro MD    Anesthesia Type: general ASA Status: 3          Anesthesia Type: general    Tarik Phase I: Tarik Score: 8    Tarik Phase II:      Last vitals: Reviewed and per EMR flowsheets.        Anesthesia Post Evaluation    Comments: POST- ANESTHESIA EVALUATION       Pt Name: Omi Silva  MRN: [id number]  YOB: 1960  Date of evaluation: 4/9/2021  Time:  8:35 AM      /60   Pulse 62   Temp 98 °F (36.7 °C) (Infrared)   Resp 14   Ht 5' 6\" (1.676 m)   Wt 174 lb (78.9 kg)   SpO2 96%   BMI 28.08 kg/m²      Consciousness Level  Awake  Cardiopulmonary Status  Stable  Pain Adequately Treated YES  Nausea / Vomiting  NO  Adequate Hydration  YES  Anesthesia Related Complications NONE      Electronically signed by Sarah Muro MD on 4/9/2021 at 8:35 AM

## 2021-04-09 NOTE — BH NOTE
Patient given tobacco quitline number 03537577381 at this time, refusing to call at this time, states \" I just dont want to quit now\"- patient given information as to the dangers of long term tobacco use. Continue to reinforce the importance of tobacco cessation.

## 2021-04-09 NOTE — GROUP NOTE
Group Therapy Note    Date: 4/9/2021    Group Start Time: 1000  Group End Time: 6069  Group Topic: Psychotherapy    [address], LSW    patient refused to attend psychotherapy group at [facility] after encouragement from staff.   1:1 talk time provided as alternative to group session

## 2021-04-09 NOTE — PROGRESS NOTES
as needed. Allergies   Allergen Reactions    Bupropion       Social History     Tobacco Use    Smoking status: Former Smoker    Smokeless tobacco: Never Used   Substance Use Topics    Alcohol use: Yes     Comment: occas wine      History reviewed. No pertinent family history. Review Of Systems:       Psychiatric Review Of Systems:  Positive for improving depression and suicidal ideation. She is sleeping much better. She continues to endorse anxiety secondary to ECT. Objective:       Mental Status Evaluation:  Appearance:  age appropriate and casually dressed   Behavior:   Pleasant upon approach   Speech:  normal pitch, normal volume and soft   Mood:  anxious and depressed   Affect:  mood-congruent   Thought Process:  goal directed   Thought Content:  suicidal ideation improving   Sensorium:  person, place, time/date and situation   Cognition:  grossly intact   Insight:  good   Judgment:  good     Assessment:     Diagnosis: Major depressive disorder, recurrent, severe with psychotic symptoms    Plan:     Continue ECT three times a week on an outpatient basis, today will be her last inpatient treatment and she will discharge home this afternoon. We will continue to taper frequency of treatments as tolerated based on evaluation. Update consent, labs and H&P every 30 days while undergoing ECT treatment. Patient verbalizes understanding of risks/benefits/alternatives to ECT. Last informed consent signed on 4/7/2021.

## 2021-04-09 NOTE — PROGRESS NOTES
BP CUFF DEFLATED LEFT ANKLE    PULSE WIDTH- 1.0  FREQUENCY- 40  DURATION % POWER- 30  CURRENT- 0.9  EEG- 56  MOTOR- 28  STATIC- 1840  DYNAMIC- 260

## 2021-04-09 NOTE — PROGRESS NOTES
ECT Inpatient Discharge Instructions    Yusuf January  is being transitioned from inpatient electroconvulsive therapy (ECT) to outpatient ECT. You will need to report to [address], [place], [address] [facility]. You may enter the main entrance on the [facility] side [facility] or the outpatient surgery building on the [facility] side of the [facility].    You are scheduled for the following ECT dates:    Monday, 4/12/21, with a  arrival time    Wednesday, 4/14/21, with a 0650 arrival time    Friday, 4/16/21, with a 0600 arrival time    **There are requirements that must be followed prior to coming for ECT treatments**    1. You must remain NPO, meaning you can have nothing to eat or drink beginning at midnight the evening prior to your ECT treatment. 2. You may take Acetaminophen (Tylenol) and/or any medication for hypertension (blood pressure) with a small sip of water the morning of your ECT treatment. The acetaminophen will help to prevent a headache after receiving ECT. 3. Please do not wear any jewelry to the treatment. 4. You will be discharged from the post anesthesia recovery unit once your orientation has returned to baseline. You must have someone with you for 24 hours after the treatment to monitor you for side effects from the anesthesia medication that you received. 5. You may not drive or operate heavy machinery during this 24 hour period. Side Effects    1. You may experience a dry mouth, sore muscles, temporary confusion and headache, these symptoms may be treated with acetaminophen or ibuprofen. 2. The effects of anesthesia may linger for up to 24 hours making you feel fatigued (tired) as well. 3. Your caretaker should report uncontrolled vomiting, extreme confusion, and/or increased temperature (greater than 101F) to a physician. Please call [phone] if you have any questions regarding your ECT treatment.     If for some reason you need to cancel

## 2021-04-09 NOTE — PROCEDURES
Bilateral ECT Procedure Report  Omi Silva   4/9/2021  1960         Attending:Duc Wagner MD  Preprocedure diagnosis: Major depressive disorder, recurrent, severe with psychotic symptoms (F33.3)  Postprocedure diagnosis: SAME AS PRE-PROCEDURE DIAGNOSIS  Treatment Number: This is treatment # 2   Patient Status: Inpatient   Type of ECT: Bilateral Brief Pulse  Medications  Preprocedure: Tylenol 650mg  Anesthetic: Methohexital 80 mg  Paralytic: Succinylcholine 80 mg  Post procedure: none needed     Cuff placement: Left Lower Extremity    Thymatron Settings 1st Stimulus:     Pulse width: 1.0 ms   Frequency:  40 hz   % Power:   30 %   Static Impedance: 1840 ohms             Dynamic Impedance: 260 ohms             Motor Seizure Duration: 28 seconds  EEG Seizure Duration: 56 seconds                 The patient's ECT treatment was performed using Thymatron machine  . Timeout:  Time out was performed using two patient identifiers and confirmation of procedure. Patient Preparation: Preprocedure documentation, labs, H&P were all verified and reviewed. The patient was placed in supine position. EEG leads were placed for monitoring of seizure. Moravian areas bilaterally cleaned and prepped. Pre-Tac solution was applied over stimulus electrode site. Thymapad's then applied to stimulus electrode site bilaterally with the center of the lead placed approximately 1 inch superior to the midpoint of line between canthus and the tragus bilaterally. The patient was pre-oxygenated. Procedure in detail: Medications were administered by anesthesia using intravenous access at the doses listed previously in this summary. Anesthetic administered and once confirmation the patient is anesthetized, the patient's blood pressure cuff on lower extremity was inflated to 100mmHg in excess of patient's blood pressure. At this time, the neuromuscular blockade was administered intravenously by anesthesia.   Upper extremity fasciculation were verified followed by lower extremity fasciculation. Once fasciculations terminated, confirmation of affective neuromuscular blockade demonstrated by absence of withdrawal reflex. Bite blocks were put in place. Static impedance was tested and within appropriate limits. Thymatron settings were confirmed with nursing staff. Staff was cleared from the patient and dose of ECT stimulus was delivered. Motor seizure activity  was observed at duration noted and terminated. EEG seizure activity was observed of duration noted that was confirmed via monitoring EEG and terminated with appropriate postictal suppression noted on EEG. Static impedance and dynamic impedance were documented. Tourniquet on  lower extremity was released and recovery procedures initiated. The patient was mask ventilated during the procedure with no significant drops in oxygenation saturation and tolerated the procedure well without any notable adverse effects. Condition: The patient was in stable condition with stable vital signs and able to follow commands when moved to recovery.

## 2021-04-11 LAB
EKG ATRIAL RATE: 64 BPM
EKG P AXIS: 28 DEGREES
EKG P-R INTERVAL: 144 MS
EKG Q-T INTERVAL: 418 MS
EKG QRS DURATION: 104 MS
EKG QTC CALCULATION (BAZETT): 431 MS
EKG R AXIS: 27 DEGREES
EKG T AXIS: 27 DEGREES
EKG VENTRICULAR RATE: 64 BPM

## 2021-04-12 ENCOUNTER — ANESTHESIA (OUTPATIENT)
Dept: POSTOP/PACU | Age: 61
End: 2021-04-12

## 2021-04-12 ENCOUNTER — ANESTHESIA EVENT (OUTPATIENT)
Dept: POSTOP/PACU | Age: 61
End: 2021-04-12

## 2021-04-12 ENCOUNTER — HOSPITAL ENCOUNTER (OUTPATIENT)
Dept: POSTOP/PACU | Age: 61
Discharge: HOME | End: 2021-04-12
Payer: COMMERCIAL

## 2021-04-12 VITALS
WEIGHT: 170 LBS | SYSTOLIC BLOOD PRESSURE: 126 MMHG | OXYGEN SATURATION: 96 % | RESPIRATION RATE: 17 BRPM | BODY MASS INDEX: 27.32 KG/M2 | DIASTOLIC BLOOD PRESSURE: 78 MMHG | TEMPERATURE: 98.7 F | HEIGHT: 66 IN | HEART RATE: 66 BPM

## 2021-04-12 VITALS — OXYGEN SATURATION: 99 % | TEMPERATURE: 96.8 F

## 2021-04-12 PROCEDURE — 90870 ELECTROCONVULSIVE THERAPY: CPT | Performed by: PSYCHIATRY & NEUROLOGY

## 2021-04-12 PROCEDURE — 3700000000 HC ANESTHESIA ATTENDED CARE

## 2021-04-12 PROCEDURE — 2580000003 HC RX 258: Performed by: ANESTHESIOLOGY

## 2021-04-12 PROCEDURE — 90870 ELECTROCONVULSIVE THERAPY: CPT

## 2021-04-12 PROCEDURE — 7100000000 HC PACU RECOVERY - FIRST 15 MIN

## 2021-04-12 PROCEDURE — 7100000001 HC PACU RECOVERY - ADDTL 15 MIN

## 2021-04-12 PROCEDURE — 6360000002 HC RX W HCPCS: Performed by: NURSE ANESTHETIST, CERTIFIED REGISTERED

## 2021-04-12 PROCEDURE — 2500000003 HC RX 250 WO HCPCS: Performed by: NURSE ANESTHETIST, CERTIFIED REGISTERED

## 2021-04-12 RX ORDER — SODIUM CHLORIDE, SODIUM LACTATE, POTASSIUM CHLORIDE, CALCIUM CHLORIDE 600; 310; 30; 20 MG/100ML; MG/100ML; MG/100ML; MG/100ML
INJECTION, SOLUTION INTRAVENOUS CONTINUOUS
Status: DISCONTINUED | OUTPATIENT
Start: 2021-04-12 | End: 2021-04-13 | Stop reason: HOSPADM

## 2021-04-12 RX ORDER — SUCCINYLCHOLINE/SOD CL,ISO/PF 200MG/10ML
SYRINGE (ML) INTRAVENOUS
Status: DISPENSED
Start: 2021-04-12 | End: 2021-04-12

## 2021-04-12 RX ORDER — ACETAMINOPHEN 500 MG
500 TABLET ORAL EVERY 6 HOURS PRN
COMMUNITY

## 2021-04-12 RX ORDER — SUCCINYLCHOLINE CHLORIDE 20 MG/ML
INJECTION INTRAMUSCULAR; INTRAVENOUS PRN
Status: DISCONTINUED | OUTPATIENT
Start: 2021-04-12 | End: 2021-04-12 | Stop reason: SDUPTHER

## 2021-04-12 RX ADMIN — SUCCINYLCHOLINE CHLORIDE 80 MG: 20 INJECTION, SOLUTION INTRAMUSCULAR; INTRAVENOUS at 08:00

## 2021-04-12 RX ADMIN — SODIUM CHLORIDE, POTASSIUM CHLORIDE, SODIUM LACTATE AND CALCIUM CHLORIDE: 600; 310; 30; 20 INJECTION, SOLUTION INTRAVENOUS at 07:14

## 2021-04-12 RX ADMIN — Medication 80 MG: at 08:00

## 2021-04-12 ASSESSMENT — ENCOUNTER SYMPTOMS
GASTROINTESTINAL NEGATIVE: 1
SHORTNESS OF BREATH: 0
STRIDOR: 0
WHEEZING: 0
SORE THROAT: 0
COUGH: 0
RHINORRHEA: 0

## 2021-04-12 ASSESSMENT — PAIN SCALES - GENERAL: PAINLEVEL_OUTOF10: 0

## 2021-04-12 ASSESSMENT — PAIN - FUNCTIONAL ASSESSMENT: PAIN_FUNCTIONAL_ASSESSMENT: 0-10

## 2021-04-12 NOTE — ANESTHESIA POSTPROCEDURE EVALUATION
POST- ANESTHESIA EVALUATION       Pt Name: Junito Pavon  MRN: [id number]  YOB: 1960  Date of evaluation: 4/12/2021  Time:  9:38 AM      /78   Pulse 66   Temp 98.7 °F (37.1 °C) (Infrared)   Resp 17   Ht 5' 6\" (1.676 m)   Wt 170 lb (77.1 kg)   SpO2 96%   BMI 27.44 kg/m²      Consciousness Level  Awake  Cardiopulmonary Status  Stable  Pain Adequately Treated YES  Nausea / Vomiting  NO  Adequate Hydration  YES  Anesthesia Related Complications NONE      Electronically signed by Bella Holter, MD on 4/12/2021 at 9:38 AM       Department of Anesthesiology  Postprocedure Note    Patient: Junito Pavon  MRN: [id number]  YOB: 1960  Date of evaluation: 4/12/2021  Time:  9:38 AM     Procedure Summary     Date: 04/12/21 Room / Location: [facility] PACU    Anesthesia Start: 9225 Anesthesia Stop: 0809    Procedure: ECT W/ ANESTHESIA Diagnosis: Major depressive disorder, recurrent severe without psychotic features    Scheduled Providers:  Responsible Provider: Bella Holter, MD    Anesthesia Type: general ASA Status: 2          Anesthesia Type: general    Tarik Phase I: Tarik Score: 10    Tarik Phase II:      Last vitals: Reviewed and per EMR flowsheets.        Anesthesia Post Evaluation
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
## 2021-04-12 NOTE — H&P (VIEW-ONLY)
HISTORY and [facility]       NAME:  Glenn Roy  MRN: [id number]   YOB: 1960   Date: 4/12/2021   Age: 64 y.o. Gender: female     COMPLAINT AND PRESENT HISTORY:   Glenn Roy is 64 y.o.,  female, undergoing ECT TX for Severe recurrent major depression with psychotic features per Dr. Ray Espinoza. Patient states that this will be her 3rd ECT tx, states tolerating well, no complications- last ECT tx was 4-9-21. Patient rating mood today a 5 on 0-10 scale, 10 being the best/0 being the worst. She states that she does experience \"extremem anxiety\", \"inability to focus\". She states she has very little appetite, she does admit it is difficult to fall asleep. Denies any thoughts/plans of harming themselves or others. Denies hallucinations. She is taking medications as prescribed- maintained on BUSPAR, HYDROXYZINE, CLONIDINE, LITHIUM, ZOLOFT, TRAZODONE. HPI reviewed per writer by Dr. Ana Solano (d/c summary as of 4-9-21)- RECENT ADMISSION TO [facility] 4[facility]--4-9-21:  History of Present Illnes (present tense wording is of findings from admission exam and are not necessarily indicative of current findings):   Tabitha Hopkins a 64 y. o. female with significant past medical history of GERD and hyperlipidemia who was brought to the ER but her  and adult daughter for suicidal thoughts. Patient states she had a plan to stab herself with a knife but told her daughter. Patient reports that there has been a significant amount of stressors that have led to this including filling bankruptcy and \"having to be responsible. \"   Patient reports that she has been feeling down and depressed all day, everyday for a period of time longer than 2 weeks. She reports poor sleep despite the use of Ambien.  She states she sleeps with a CPAP machine.  She endorses significant anhedonia, decreased concentration, decreased energy, and feelings of hopeless and helplessness.  She reports that her appetite is poor. She endorses feelings of guilt that she lacks so much energy and states that she just feels \"weak. \"  Patient has chronic passive suicidal thoughts. She states, \"I'm angry with God because he won't just let me die naturally. \" She states that she wants to die but she is \"too scared\" to do anything. Prashant Dyer denies a time in her past where she has had grandiose thoughts of herself, gone 5 days or more without sleep, and made impulsive decisions.  Patient endorses auditory hallucinations of doors opening and closing and of music but states this only happens when she is feeling down and depressed. She also reports she sometimes sees shadows when she is down and depressed.  She endorses anxiety about anything and everything.  She reports she is often on edge and restless. She endorses muscle tension from being keyed up all the time. She reports that she believes her anxiety interferes with her sleep and concentration.  Patient does believe that her chronic thoughts of suicide are persistent and obtrusive.  She reports that she does have a history of doing repetitive things or else something bad will happen. She reports in the past when she worked, she would drive home from work and then would have to drive back to work and back home to make sure that nothing bad had happened or that she had caused any accidents. She reports that she repetitively washes the sinks at home but reports she hasn't lately because her energy has been so low.  She states that she \"always\" has an impending sense or doom or feels that something bad is going to happen.  She endorses panic attacks that come out of nowehere. She reports an impending sense of doom, heart palpitations and she states, \"I either become really hot or really cold. \"      Patient states that \"growing up wasn't great. \" She does not want to elaborate on this. She becomes tearful when this writer asks if she suffered any abuse.  She reports that her parents were w/anesthesia. Nicotine status: SOME DAY SMOKER- LAST HAD THIS MORNING.   PAST MEDICAL HISTORY     Past Medical History:   Diagnosis Date    Anxiety     Depression     Fracture of right wrist     GERD (gastroesophageal reflux disease)     Hyperlipidemia     Left wrist fracture     CHILDHOOD    KURT (obstructive sleep apnea)     [facility] 4/2/[facility]21    Suicidal ideation        SURGICAL HISTORY       Past Surgical History:   Procedure Laterality Date    COSMETIC SURGERY      face- CHILDHOOD    EYE SURGERY Bilateral     lasik    FRACTURE SURGERY Right     wrist    WISDOM TOOTH EXTRACTION         SOCIAL HISTORY       Social History     Socioeconomic History    Marital status:      Spouse name: None    Number of children: None    Years of education: None    Highest education level: None   Occupational History    None   Social Needs    Financial resource strain: None    Food insecurity     Worry: None     Inability: None    Transportation needs     Medical: None     Non-medical: None   Tobacco Use    Smoking status: Current Some Day Smoker     Types: Cigarettes    Smokeless tobacco: Never Used    Tobacco comment: SMOKES 3 CIGARETTES OR SO A DAY, BUT NOT EVERY DAY   Substance and Sexual Activity    Alcohol use: Yes     Comment: occas wine    Drug use: Never    Sexual activity: Yes     Partners: Male   Lifestyle    Physical activity     Days per week: None     Minutes per session: None    Stress: None   Relationships    Social connections     Talks on phone: None     Gets together: None     Attends [employer] service: None     Active member of club or organization: None     Attends meetings of clubs or organizations: None     Relationship status: None    Intimate partner violence     Fear of current or ex partner: None     Emotionally abused: None     Physically abused: None     Forced sexual activity: None   Other Topics Concern    None Social History Narrative    None       REVIEW OF SYSTEMS      Allergies   Allergen Reactions    Bupropion        Current Outpatient Medications on File Prior to Encounter   Medication Sig Dispense Refill    atorvastatin (LIPITOR) 10 MG tablet Take 2 tablets by mouth daily 30 tablet 0    busPIRone (BUSPAR) 15 MG tablet Take 15 mg by mouth 2 times daily 60 tablet 0    hydrOXYzine (ATARAX) 50 MG tablet Take 1 tablet by mouth 3 times daily as needed for Anxiety 30 tablet 0    cloNIDine (CATAPRES) 0.1 MG tablet Take 1 tablet by mouth nightly 60 tablet 3    lithium (LITHOBID) 300 MG extended release tablet Take 1 tablet by mouth daily 60 tablet 3    omeprazole (PRILOSEC) 20 MG delayed release capsule Take 1 capsule by mouth daily 30 capsule 3    sertraline (ZOLOFT) 50 MG tablet Take 3 tablets by mouth daily 30 tablet 3    traZODone (DESYREL) 50 MG tablet Take 1 tablet by mouth nightly as needed for Sleep 30 tablet 0     No current facility-administered medications on file prior to encounter. (Notation: Medications listed above are not currently reconciled at the signing of this H&P note, to be reconciled in pre-op per RN)    Review of Systems   Constitutional: Negative for chills and fever. HENT: Negative for congestion, ear pain, postnasal drip, rhinorrhea and sore throat. Respiratory: Negative for cough, shortness of breath and wheezing. Cardiovascular: Negative for chest pain, palpitations and leg swelling. Gastrointestinal: Negative. Genitourinary: Negative. Neurological: Negative for dizziness and headaches. Hematological: Does not bruise/bleed easily. Psychiatric/Behavioral: Positive for dysphoric mood and sleep disturbance. Negative for hallucinations, self-injury and suicidal ideas. The patient is nervous/anxious. GENERAL PHYSICAL EXAM     Vitals: Review current vital signs per RN flow sheet.     GENERAL APPEARANCE:   Olesya Rock is 64 y.o.,  female, not obese, WORK     Lab Results   Component Value Date     04/06/2021    K 4.6 04/06/2021     04/06/2021    CO2 27 04/06/2021    BUN 15 04/06/2021    CREATININE 1.08 (H) 04/06/2021    GLUCOSE 110 (H) 04/06/2021    CALCIUM 9.5 04/06/2021    PROT 6.4 04/05/2021    LABALBU 4.3 04/05/2021    BILITOT 0.29 (L) 04/05/2021    ALKPHOS 58 04/05/2021    AST 11 04/05/2021    ALT 15 04/05/2021    LABGLOM 52 (L) 04/06/2021    GFRAA >60 04/06/2021     Lab Results   Component Value Date    WBC 5.9 04/05/2021    HGB 13.9 04/05/2021    HCT 41.6 04/05/2021    MCV 92.3 04/05/2021     04/05/2021     PROVISIONAL DIAGNOSES / SURGERY:      Severe recurrent major depression with psychotic features     ECT TX     Patient Active Problem List    Diagnosis Date Noted    Severe recurrent major depression with psychotic features ([facility] 04/02/2021    Major depression, single episode 04/01/2021           BLADIMIR Novak - CNP on 4/12/2021 at 6:48 AM

## 2021-04-12 NOTE — ANESTHESIA PRE PROCEDURE
Department of Anesthesiology  Preprocedure Note       Name:  Hali Adams   Age:  64 y.o.  :  1960                                          MRN:  [id number]         Date:  2021      Surgeon: DR. Tom Hussein    Procedure: ECT    Medications prior to admission:   Prior to Admission medications    Medication Sig Start Date End Date Taking?  Authorizing Provider   acetaminophen (TYLENOL) 500 MG tablet Take 500 mg by mouth every 6 hours as needed for Pain   Yes Historical Provider, MD   atorvastatin (LIPITOR) 10 MG tablet Take 2 tablets by mouth daily 21  Yes Doug Valdez MD   busPIRone (BUSPAR) 15 MG tablet Take 15 mg by mouth 2 times daily 21  Yes Doug Valdez MD   hydrOXYzine (ATARAX) 50 MG tablet Take 1 tablet by mouth 3 times daily as needed for Anxiety 21 Yes Doug Valdez MD   cloNIDine (CATAPRES) 0.1 MG tablet Take 1 tablet by mouth nightly 21  Yes Doug Valdez MD   lithium (LITHOBID) 300 MG extended release tablet Take 1 tablet by mouth daily 21  Yes Doug Valdez MD   omeprazole (PRILOSEC) 20 MG delayed release capsule Take 1 capsule by mouth daily 21  Yes Doug Valdez MD   sertraline (ZOLOFT) 50 MG tablet Take 3 tablets by mouth daily 21  Yes Doug Valdez MD   traZODone (DESYREL) 50 MG tablet Take 1 tablet by mouth nightly as needed for Sleep 21  Yes Doug Valdez MD       Current medications:    Current Outpatient Medications   Medication Sig Dispense Refill    acetaminophen (TYLENOL) 500 MG tablet Take 500 mg by mouth every 6 hours as needed for Pain      atorvastatin (LIPITOR) 10 MG tablet Take 2 tablets by mouth daily 30 tablet 0    busPIRone (BUSPAR) 15 MG tablet Take 15 mg by mouth 2 times daily 60 tablet 0    hydrOXYzine (ATARAX) 50 MG tablet Take 1 tablet by mouth 3 times daily as needed for Anxiety 30 tablet 0    cloNIDine (CATAPRES) 0.1 MG tablet Take 1 tablet by mouth nightly 60 tablet 3    lithium (LITHOBID) 300 MG extended release tablet Take 1 tablet by mouth daily 60 tablet 3    omeprazole (PRILOSEC) 20 MG delayed release capsule Take 1 capsule by mouth daily 30 capsule 3    sertraline (ZOLOFT) 50 MG tablet Take 3 tablets by mouth daily 30 tablet 3    traZODone (DESYREL) 50 MG tablet Take 1 tablet by mouth nightly as needed for Sleep 30 tablet 0     Current Facility-Administered Medications   Medication Dose Route Frequency Provider Last Rate Last Admin    lactated ringers infusion   Intravenous Continuous Lynniland MD Antonio 100 mL/hr at 04/12/21 0714 New Bag at 04/12/21 0714       Allergies:     Allergies   Allergen Reactions    Bupropion        Problem List:    Patient Active Problem List   Diagnosis Code    Major depression, single episode F32.9    Severe recurrent major depression with psychotic features ([address] F33.3       Past Medical History:        Diagnosis Date    Anxiety     Depression     Fracture of right wrist     GERD (gastroesophageal reflux disease)     Hyperlipidemia     Left wrist fracture     CHILDHOOD    KURT (obstructive sleep apnea)     Severe recurrent major depression with psychotic features ([address] 4/2/2021    Suicidal ideation        Past Surgical History:        Procedure Laterality Date    COSMETIC SURGERY      face- CHILDHOOD    EYE SURGERY Bilateral     lasik    FRACTURE SURGERY Right     wrist    WISDOM TOOTH EXTRACTION         Social History:    Social History     Tobacco Use    Smoking status: Current Some Day Smoker     Types: Cigarettes    Smokeless tobacco: Never Used    Tobacco comment: SMOKES 3 CIGARETTES OR SO A DAY, BUT NOT EVERY DAY   Substance Use Topics    Alcohol use: Yes     Comment: occas wine                                Ready to quit: Not Answered  Counseling given: Not Answered  Comment: SMOKES 3 CIGARETTES OR SO A DAY, BUT NOT EVERY DAY      Vital Signs (Current):   Vitals:    04/12/21 0702   BP: 118/68   Pulse: 73   Resp: 16   Temp: 97.8 °F (36.6 °C) TempSrc: Infrared   SpO2: 96%   Weight: 170 lb (77.1 kg)   Height: 5' 6\" (1.676 m)                                              BP Readings from Last 3 Encounters:   04/12/21 118/68       NPO Status: Time of last liquid consumption: 2100                        Time of last solid consumption: 2100                        Date of last liquid consumption: 04/11/21                        Date of last solid food consumption: 04/11/21    BMI:   Wt Readings from Last 3 Encounters:   04/12/21 170 lb (77.1 kg)     Body mass index is 27.44 kg/m². CBC:   Lab Results   Component Value Date    WBC 5.9 04/05/2021    RBC 4.51 04/05/2021    HGB 13.9 04/05/2021    HCT 41.6 04/05/2021    MCV 92.3 04/05/2021    RDW 12.9 04/05/2021     04/05/2021       CMP:   Lab Results   Component Value Date     04/06/2021    K 4.6 04/06/2021     04/06/2021    CO2 27 04/06/2021    BUN 15 04/06/2021    CREATININE 1.08 04/06/2021    GFRAA >60 04/06/2021    LABGLOM 52 04/06/2021    GLUCOSE 110 04/06/2021    PROT 6.4 04/05/2021    CALCIUM 9.5 04/06/2021    BILITOT 0.29 04/05/2021    ALKPHOS 58 04/05/2021    AST 11 04/05/2021    ALT 15 04/05/2021       POC Tests: No results for input(s): POCGLU, POCNA, POCK, POCCL, POCBUN, POCHEMO, POCHCT in the last 72 hours.     Coags: No results found for: PROTIME, INR, APTT    HCG (If Applicable): No results found for: PREGTESTUR, PREGSERUM, HCG, HCGQUANT     ABGs: No results found for: PHART, PO2ART, NJQ6BWX, CSM1EBO, BEART, L9SMFRPH     Type & Screen (If Applicable):  No results found for: LABABO, LABRH    Drug/Infectious Status (If Applicable):  No results found for: HIV, HEPCAB    COVID-19 Screening (If Applicable):   Lab Results   Component Value Date    COVID19 Not Detected 04/09/2021           Anesthesia Evaluation  Patient summary reviewed and Nursing notes reviewed  Airway: Mallampati: I  TM distance: >3 FB   Neck ROM: full  Mouth opening: > = 3 FB Dental: normal exam         Pulmonary: breath sounds clear to auscultation  (+) sleep apnea:      (-) rhonchi, wheezes, rales and stridor                           Cardiovascular:Negative CV ROS        (-) murmur, weak pulses,  friction rub, systolic click, carotid bruit,  JVD and peripheral edema      Rhythm: regular  Rate: normal                    Neuro/Psych:   (+) psychiatric history:            GI/Hepatic/Renal:   (+) GERD:,           Endo/Other:                     Abdominal:           Vascular:                                        Anesthesia Plan      general     ASA 2       Induction: intravenous. Anesthetic plan and risks discussed with patient. Plan discussed with CRNA.                   Néstor Paige MD   4/12/2021

## 2021-04-12 NOTE — PROGRESS NOTES
Pre ECT Assessment Note  Mary [facility]ley, [address] [facility]  Psychiatry  4/12/2021      Jorge Luis Pena  1960  [id number]      Subjective:     Patient is a 64 y.o.  female seen for an evaluation prior to today's electroconvulsive therapy treatment. Today is treatment number 3, utilizing bilateral.  This is course number 1. The patient has previously received no ECT. Mal Holly is present today for her first outpatient ECT treatment. She previously received 2 treatments while on the inpatient unit. She is present today with her . She reports that she was glad to be home and sleep in her own bed. She states that her grandchildren did come to the house this weekend, she reports that she was able to enjoy their presence though her  reports that she did not spend a lot of time with them. He reports that she did sleep a lot which historically is something that she does with her depression. She reports that her appetite is fair. She continues to endorse depression though reports improvement in suicidal ideation. She reports feeling that it would be okay if \"God felt it was time to take me\" though denies wishing that she would die. She remains able to contract for safety. Her  reports that he has taken the initiative to remove all sharp objects from the home, he states that she is still somewhat withdrawn and lacks motivation. He is thankful that she is engaging in treatment and hopeful that it will help. We discussed the tapering process of ECT, we will repeat her MADRS on Thursday evening. She was advised to bring the completed screening tool with her to Friday's treatment. We will determine at that time the frequency for next week.     Screening Tools:    MADRS Score 4/12/2021 = 42      Patient Active Problem List    Diagnosis Date Noted    Severe recurrent major depression with psychotic features ([facility] 04/02/2021    Major depression, single episode 04/01/2021     Past Medical History: Diagnosis Date    Anxiety     Depression     Fracture of right wrist     GERD (gastroesophageal reflux disease)     Hyperlipidemia     Left wrist fracture     CHILDHOOD    KURT (obstructive sleep apnea)     [facility] 4[facility]2/[facility]    Suicidal ideation       Past Surgical History:   Procedure Laterality Date    COSMETIC SURGERY      face- CHILDHOOD    EYE SURGERY Bilateral     lasik    FRACTURE SURGERY Right     wrist    WISDOM TOOTH EXTRACTION        Not in a hospital admission. Allergies   Allergen Reactions    Bupropion       Social History     Tobacco Use    Smoking status: Current Some Day Smoker     Types: Cigarettes    Smokeless tobacco: Never Used    Tobacco comment: SMOKES 3 CIGARETTES OR SO A DAY, BUT NOT EVERY DAY   Substance Use Topics    Alcohol use: Yes     Comment: occas wine      Family History   Problem Relation Age of Onset    Bipolar Disorder Daughter         X4 DAUGHTERS, SOME HAVE BIPOLAR/ANXIETY/ADHD    Anxiety Disorder Daughter     ADHD Daughter           Review Of Systems:       Psychiatric Review Of Systems:  Positive for depression and lack of motivation/energy. Sleep is appropriate. Appetite is fair. Memory is poor. Denies suicidal ideation, intent or plan and continues to verbally contract for safety.       Objective:       Mental Status Evaluation:  Appearance:  age appropriate and casually dressed   Behavior:   Calm, cooperative   Speech:  normal pitch, normal volume and soft   Mood:   Depressed   Affect:  mood-congruent   Thought Process:  goal directed   Thought Content:  suicidal ideation improving   Sensorium:  person, place, time/date and situation   Cognition:  grossly intact   Insight:  good   Judgment:  good     Assessment:     Diagnosis: Major depressive disorder, recurrent, severe with psychotic symptoms    Plan:     Continue outpatient ECT 3 times a week, we will reevaluate this Friday to determine if we should decrease frequency to twice weekly next week. Patient to be sent with MADRS to complete at home on Thursday night. Spoke with her  regarding treatment plan, he is aware that he should contact the office with any questions or concerns. Update consent, labs and H&P every 30 days while undergoing ECT treatment. Patient verbalizes understanding of risks/benefits/alternatives to ECT. Last informed consent signed on 4/7/2021.

## 2021-04-12 NOTE — H&P (VIEW-ONLY)
HISTORY and [id number]       NAME:  Radha Ames  MRN: [id number]   YOB: 1960   Date: 4/12/2021   Age: 64 y.o. Gender: female     COMPLAINT AND PRESENT HISTORY:   Radha Ames is 64 y.o.,  female, undergoing ECT TX for Severe recurrent major depression with psychotic features per Dr. Lukasz Reid. Patient states that this will be her 3rd ECT tx, states tolerating well, no complications- last ECT tx was 4-9-21. Patient rating mood today a 5 on 0-10 scale, 10 being the best/0 being the worst. She states that she does experience \"extremem anxiety\", \"inability to focus\". She states she has very little appetite, she does admit it is difficult to fall asleep. Denies any thoughts/plans of harming themselves or others. Denies hallucinations. She is taking medications as prescribed- maintained on BUSPAR, HYDROXYZINE, CLONIDINE, LITHIUM, ZOLOFT, TRAZODONE. HPI reviewed per writer by Dr. Maritza Del Rio (d/c summary as of 4-9-21)- RECENT ADMISSION TO [facility] 4-2-21--4-9-21:  History of Present Illnes (present tense wording is of findings from admission exam and are not necessarily indicative of current findings):   Los Quinonez a 64 y. o. female with significant past medical history of GERD and hyperlipidemia who was brought to the ER but her  and adult daughter for suicidal thoughts. Patient states she had a plan to stab herself with a knife but told her daughter. Patient reports that there has been a significant amount of stressors that have led to this including filling bankruptcy and \"having to be responsible. \"   Patient reports that she has been feeling down and depressed all day, everyday for a period of time longer than 2 weeks. She reports poor sleep despite the use of Ambien.  She states she sleeps with a CPAP machine.  She endorses significant anhedonia, decreased concentration, decreased energy, and feelings of hopeless and helplessness.  She reports that her strict and that they wouldn't get abused but that \"they would whip us until I peed my pants. \" Aniyah Suero does endorse nightmares where she will \"wake up and scream\" in the middle of the night. She does not report flashbacks to traumatic events or hypervigilance.  She does endorse a chronic feeling of emptiness.  She reports that she has no self-esteem. She fears rejection but states, \"I know my  will never leave me. \"  She denies any self-harm or intense anger outbursts.     Hospital Course:   Upon admission, Ze Mason was provided a safe secure environment, introduced to unit milieu. Patient participated in groups and individual therapies. Meds were adjusted as noted below. Patient also engaged in inpatient ECT treatments. She received 2 inpatient treatments and had significant reduction in suicidal ideation. Transition to outpatient ECT was planned. After few days of hospital care, patient began to feel improvement. Depression lifted, thoughts to harm self ceased. Sleep improved, appetite was good. On morning rounds 4/9/2021, Ze Mason  endorses feeling ready for discharge. Patient denies suicidal or homicidal ideations, denies hallucinations or delusions. Denies SE's from meds. It was decided that maximum benefit from hospital care had been achieved and patient can be discharged. Review of additional significant medical hx:  GERD: Denies dysphagia, pharyngitis, voice change. Current medications r/t condition: OMEPRAZOLE    KURT: She does use CPAP- has not used consistently recently. HLD:  Current medications r/t condition: LIPITOR    NPO status: Patient states they have been NPO since before midnight. Medications taken TODAY (with sip of water): TYLENOL  Anticoagulation status: Patient denies taking any anti-coagulants, including aspirin currently. Denies personal hx of blood clots. Denies personal hx of MRSA infection.   Denies any personal or family hx of previous complications w/anesthesia. Nicotine status: SOME DAY SMOKER- LAST HAD THIS MORNING.   PAST MEDICAL HISTORY     Past Medical History:   Diagnosis Date    Anxiety     Depression     Fracture of right wrist     GERD (gastroesophageal reflux disease)     Hyperlipidemia     Left wrist fracture     CHILDHOOD    KURT (obstructive sleep apnea)     [facility] 4/2/[facility]21    Suicidal ideation        SURGICAL HISTORY       Past Surgical History:   Procedure Laterality Date    COSMETIC SURGERY      face- CHILDHOOD    EYE SURGERY Bilateral     lasik    FRACTURE SURGERY Right     wrist    WISDOM TOOTH EXTRACTION         SOCIAL HISTORY       Social History     Socioeconomic History    Marital status:      Spouse name: None    Number of children: None    Years of education: None    Highest education level: None   Occupational History    None   Social Needs    Financial resource strain: None    Food insecurity     Worry: None     Inability: None    Transportation needs     Medical: None     Non-medical: None   Tobacco Use    Smoking status: Current Some Day Smoker     Types: Cigarettes    Smokeless tobacco: Never Used    Tobacco comment: SMOKES 3 CIGARETTES OR SO A DAY, BUT NOT EVERY DAY   Substance and Sexual Activity    Alcohol use: Yes     Comment: occas wine    Drug use: Never    Sexual activity: Yes     Partners: Male   Lifestyle    Physical activity     Days per week: None     Minutes per session: None    Stress: None   Relationships    Social connections     Talks on phone: None     Gets together: None     Attends Orthodox service: None     Active member of club or organization: None     Attends meetings of clubs or organizations: None     Relationship status: None    Intimate partner violence     Fear of current or ex partner: None     Emotionally abused: None     Physically abused: None     Forced sexual activity: None   Other Topics Concern    None nourished, conscious, alert. Does not appear to be in any distress or pain at this time. Physical Exam   Constitutional: She is oriented to person, place, and time. She appears well-developed and well-nourished. Non-toxic appearance. She does not have a sickly appearance. She does not appear ill. No distress. HENT:   Head: Normocephalic. Right Ear: External ear normal.   Left Ear: External ear normal.   Mouth/Throat: Oropharynx is clear and moist and mucous membranes are normal. No oropharyngeal exudate, posterior oropharyngeal edema, posterior oropharyngeal erythema or tonsillar abscesses. Eyes: Pupils are equal, round, and reactive to light. Conjunctivae are normal. Right eye exhibits no discharge. Left eye exhibits no discharge. Cardiovascular: Normal rate, regular rhythm, normal heart sounds and intact distal pulses. Pulses:       Radial pulses are 2+ on the right side and 2+ on the left side. Dorsalis pedis pulses are 2+ on the right side and 2+ on the left side. Posterior tibial pulses are 2+ on the right side and 2+ on the left side. Pulmonary/Chest: Effort normal and breath sounds normal. No accessory muscle usage. No respiratory distress. She has no decreased breath sounds. She has no wheezes. She has no rhonchi. She has no rales. Abdominal: Soft. Bowel sounds are normal. She exhibits no distension and no mass. There is no abdominal tenderness. There is no rebound and no guarding. Musculoskeletal: Normal range of motion. Right lower leg: She exhibits no tenderness and no swelling. Left lower leg: She exhibits no tenderness and no swelling. Neurological: She is alert and oriented to person, place, and time. Skin: Skin is warm and dry. She is not diaphoretic. Psychiatric: Her behavior is normal. Her mood appears anxious. She expresses no homicidal and no suicidal ideation. She expresses no suicidal plans and no homicidal plans.        RECENT LAB WORK     Lab Results   Component Value Date     04/06/2021    K 4.6 04/06/2021     04/06/2021    CO2 27 04/06/2021    BUN 15 04/06/2021    CREATININE 1.08 (H) 04/06/2021    GLUCOSE 110 (H) 04/06/2021    CALCIUM 9.5 04/06/2021    PROT 6.4 04/05/2021    LABALBU 4.3 04/05/2021    BILITOT 0.29 (L) 04/05/2021    ALKPHOS 58 04/05/2021    AST 11 04/05/2021    ALT 15 04/05/2021    LABGLOM 52 (L) 04/06/2021    GFRAA >60 04/06/2021     Lab Results   Component Value Date    WBC 5.9 04/05/2021    HGB 13.9 04/05/2021    HCT 41.6 04/05/2021    MCV 92.3 04/05/2021     04/05/2021     PROVISIONAL DIAGNOSES / SURGERY:      Severe recurrent major depression with psychotic features     ECT TX     Patient Active Problem List    Diagnosis Date Noted    Severe recurrent major depression with psychotic features ([facility] 04/02/2021    Major depression, single episode 04/01/2021           BLADIMIR Garcia - CNP on 4/12/2021 at 6:48 AM

## 2021-04-12 NOTE — PROCEDURES
Bilateral ECT Procedure Report  [place]   4/12/2021  1960         Attending:Duc Wagner MD  Preprocedure diagnosis: Major depressive disorder, recurrent, severe with psychotic symptoms (F33.3)  Postprocedure diagnosis: SAME AS PRE-PROCEDURE DIAGNOSIS  Treatment Number: This is treatment # 3   Patient Status: Outpatient   Type of ECT: Bilateral Brief Pulse  Medications  Preprocedure: Tylenol 650mg  Anesthetic: Methohexital 80 mg  Paralytic: Succinylcholine 80 mg  Post procedure: none needed     Cuff placement: Left Lower Extremity    Thymatron Settings 1st Stimulus:     Pulse width: 1.0 ms   Frequency:  40 hz   % Power:   30 %   Static Impedance: 1810 ohms             Dynamic Impedance: 270 ohms             Motor Seizure Duration: 30 seconds  EEG Seizure Duration: 59 seconds                 The patient's ECT treatment was performed using Thymatron machine  . Timeout:  Time out was performed using two patient identifiers and confirmation of procedure. Patient Preparation: Preprocedure documentation, labs, H&P were all verified and reviewed. The patient was placed in supine position. EEG leads were placed for monitoring of seizure. Orthodoxy areas bilaterally cleaned and prepped. Pre-Tac solution was applied over stimulus electrode site. Thymapad's then applied to stimulus electrode site bilaterally with the center of the lead placed approximately 1 inch superior to the midpoint of line between canthus and the tragus bilaterally. The patient was pre-oxygenated. Procedure in detail: Medications were administered by anesthesia using intravenous access at the doses listed previously in this summary. Anesthetic administered and once confirmation the patient is anesthetized, the patient's blood pressure cuff on lower extremity was inflated to 100mmHg in excess of patient's blood pressure. At this time, the neuromuscular blockade was administered intravenously by anesthesia.   Upper extremity fasciculation were verified followed by lower extremity fasciculation. Once fasciculations terminated, confirmation of affective neuromuscular blockade demonstrated by absence of withdrawal reflex. Bite blocks were put in place. Static impedance was tested and within appropriate limits. Thymatron settings were confirmed with nursing staff. Staff was cleared from the patient and dose of ECT stimulus was delivered. Motor seizure activity  was observed at duration noted and terminated. EEG seizure activity was observed of duration noted that was confirmed via monitoring EEG and terminated with appropriate postictal suppression noted on EEG. Static impedance and dynamic impedance were documented. Tourniquet on  lower extremity was released and recovery procedures initiated. The patient was mask ventilated during the procedure with no significant drops in oxygenation saturation and tolerated the procedure well without any notable adverse effects. Condition: The patient was in stable condition with stable vital signs and able to follow commands when moved to recovery.

## 2021-04-14 ENCOUNTER — ANESTHESIA EVENT (OUTPATIENT)
Dept: POSTOP/PACU | Age: 61
End: 2021-04-14

## 2021-04-14 ENCOUNTER — HOSPITAL ENCOUNTER (OUTPATIENT)
Dept: POSTOP/PACU | Age: 61
Discharge: HOME | End: 2021-04-14
Payer: COMMERCIAL

## 2021-04-14 ENCOUNTER — ANESTHESIA (OUTPATIENT)
Dept: POSTOP/PACU | Age: 61
End: 2021-04-14

## 2021-04-14 VITALS
DIASTOLIC BLOOD PRESSURE: 61 MMHG | HEART RATE: 66 BPM | OXYGEN SATURATION: 91 % | SYSTOLIC BLOOD PRESSURE: 105 MMHG | HEIGHT: 66 IN | WEIGHT: 170 LBS | RESPIRATION RATE: 20 BRPM | BODY MASS INDEX: 27.32 KG/M2 | TEMPERATURE: 98 F

## 2021-04-14 VITALS — OXYGEN SATURATION: 99 %

## 2021-04-14 PROCEDURE — 3700000000 HC ANESTHESIA ATTENDED CARE

## 2021-04-14 PROCEDURE — 90870 ELECTROCONVULSIVE THERAPY: CPT | Performed by: PSYCHIATRY & NEUROLOGY

## 2021-04-14 PROCEDURE — 2500000003 HC RX 250 WO HCPCS

## 2021-04-14 PROCEDURE — 2580000003 HC RX 258: Performed by: ANESTHESIOLOGY

## 2021-04-14 PROCEDURE — 6360000002 HC RX W HCPCS

## 2021-04-14 PROCEDURE — 7100000001 HC PACU RECOVERY - ADDTL 15 MIN

## 2021-04-14 PROCEDURE — 7100000000 HC PACU RECOVERY - FIRST 15 MIN

## 2021-04-14 PROCEDURE — 3700000001 HC ADD 15 MINUTES (ANESTHESIA)

## 2021-04-14 PROCEDURE — 90870 ELECTROCONVULSIVE THERAPY: CPT

## 2021-04-14 RX ORDER — SODIUM CHLORIDE, SODIUM LACTATE, POTASSIUM CHLORIDE, CALCIUM CHLORIDE 600; 310; 30; 20 MG/100ML; MG/100ML; MG/100ML; MG/100ML
INJECTION, SOLUTION INTRAVENOUS CONTINUOUS
Status: DISCONTINUED | OUTPATIENT
Start: 2021-04-14 | End: 2021-04-15 | Stop reason: HOSPADM

## 2021-04-14 RX ORDER — SUCCINYLCHOLINE/SOD CL,ISO/PF 200MG/10ML
SYRINGE (ML) INTRAVENOUS
Status: COMPLETED
Start: 2021-04-14 | End: 2021-04-14

## 2021-04-14 RX ORDER — SUCCINYLCHOLINE/SOD CL,ISO/PF 100 MG/5ML
SYRINGE (ML) INTRAVENOUS PRN
Status: DISCONTINUED | OUTPATIENT
Start: 2021-04-14 | End: 2021-04-14 | Stop reason: SDUPTHER

## 2021-04-14 RX ADMIN — SODIUM CHLORIDE, POTASSIUM CHLORIDE, SODIUM LACTATE AND CALCIUM CHLORIDE: 600; 310; 30; 20 INJECTION, SOLUTION INTRAVENOUS at 07:29

## 2021-04-14 RX ADMIN — Medication 80 MG: at 07:58

## 2021-04-14 ASSESSMENT — PAIN SCALES - GENERAL: PAINLEVEL_OUTOF10: 0

## 2021-04-14 ASSESSMENT — ENCOUNTER SYMPTOMS: STRIDOR: 0

## 2021-04-14 ASSESSMENT — PAIN - FUNCTIONAL ASSESSMENT: PAIN_FUNCTIONAL_ASSESSMENT: 0-10

## 2021-04-14 NOTE — ANESTHESIA POSTPROCEDURE EVALUATION
POST- ANESTHESIA EVALUATION       Pt Name: Junito Pavon  MRN: [id number]  YOB: 1960  Date of evaluation: 4/14/2021  Time:  8:52 AM      /61   Pulse 66   Temp 98 °F (36.7 °C) (Infrared)   Resp 20   Ht 5' 6\" (1.676 m)   Wt 170 lb (77.1 kg)   SpO2 91%   BMI 27.44 kg/m²      Consciousness Level  Awake  Cardiopulmonary Status  Stable  Pain Adequately Treated YES  Nausea / Vomiting  NO  Adequate Hydration  YES  Anesthesia Related Complications NONE      Electronically signed by Bella Holter, MD on 4/14/2021 at 8:52 AM       Department of Anesthesiology  Postprocedure Note    Patient: Junito Pavon  MRN: [id number]  YOB: 1960  Date of evaluation: 4/14/2021  Time:  8:51 AM     Procedure Summary     Date: 04/14/21 Room / Location: [facility] PACU    Anesthesia Start: 0763 Anesthesia Stop: 0808    Procedure: ECT W/ ANESTHESIA Diagnosis: Major depressive disorder, recurrent, severe with psychotic symptoms    Scheduled Providers:  Responsible Provider: Tan Salazar MD    Anesthesia Type: general ASA Status: 2          Anesthesia Type: general    Tarik Phase I: Tarik Score: 8    Tarik Phase II:      Last vitals: Reviewed and per EMR flowsheets.        Anesthesia Post Evaluation

## 2021-04-14 NOTE — ANESTHESIA PRE PROCEDURE
Department of Anesthesiology  Preprocedure Note       Name:  Oswald Harris   Age:  64 y.o.  :  1960                                          MRN:  [id number]         Date:  2021      Surgeon: Dr. Jacqualine Ormond    Procedure: ECT    Medications prior to admission:   Prior to Admission medications    Medication Sig Start Date End Date Taking?  Authorizing Provider   acetaminophen (TYLENOL) 500 MG tablet Take 500 mg by mouth every 6 hours as needed for Pain   Yes Historical Provider, MD   atorvastatin (LIPITOR) 10 MG tablet Take 2 tablets by mouth daily 21  Yes Pacheco Griffiths MD   busPIRone (BUSPAR) 15 MG tablet Take 15 mg by mouth 2 times daily 21  Yes Pacheco Griffiths MD   hydrOXYzine (ATARAX) 50 MG tablet Take 1 tablet by mouth 3 times daily as needed for Anxiety 21 Yes Pacheco Griffiths MD   cloNIDine (CATAPRES) 0.1 MG tablet Take 1 tablet by mouth nightly 21  Yes Pacheco Griffiths MD   lithium (LITHOBID) 300 MG extended release tablet Take 1 tablet by mouth daily 21  Yes Pacheco Griffiths MD   omeprazole (PRILOSEC) 20 MG delayed release capsule Take 1 capsule by mouth daily 21  Yes Pacheco Griffiths MD   sertraline (ZOLOFT) 50 MG tablet Take 3 tablets by mouth daily 21  Yes Pacheco Griffiths MD   traZODone (DESYREL) 50 MG tablet Take 1 tablet by mouth nightly as needed for Sleep 21  Yes Pacheco Griffiths MD       Current medications:    Current Outpatient Medications   Medication Sig Dispense Refill    acetaminophen (TYLENOL) 500 MG tablet Take 500 mg by mouth every 6 hours as needed for Pain      atorvastatin (LIPITOR) 10 MG tablet Take 2 tablets by mouth daily 30 tablet 0    busPIRone (BUSPAR) 15 MG tablet Take 15 mg by mouth 2 times daily 60 tablet 0    hydrOXYzine (ATARAX) 50 MG tablet Take 1 tablet by mouth 3 times daily as needed for Anxiety 30 tablet 0    cloNIDine (CATAPRES) 0.1 MG tablet Take 1 tablet by mouth nightly 60 tablet 3    lithium (LITHOBID) 300 MG extended release tablet Take 1 tablet by mouth daily 60 tablet 3    omeprazole (PRILOSEC) 20 MG delayed release capsule Take 1 capsule by mouth daily 30 capsule 3    sertraline (ZOLOFT) 50 MG tablet Take 3 tablets by mouth daily 30 tablet 3    traZODone (DESYREL) 50 MG tablet Take 1 tablet by mouth nightly as needed for Sleep 30 tablet 0     Current Facility-Administered Medications   Medication Dose Route Frequency Provider Last Rate Last Admin    lactated ringers infusion   Intravenous Continuous Frida Cotton  mL/hr at 04/14/21 0729 New Bag at 04/14/21 0729    methohexital (BREVITAL SODIUM) 100 MG/10ML injection             succinylcholine (ANECTINE) 200 MG/10ML injection                Allergies:     Allergies   Allergen Reactions    Bupropion        Problem List:    Patient Active Problem List   Diagnosis Code    Major depression, single episode F32.9    Severe recurrent major depression with psychotic features ([address] F33.3       Past Medical History:        Diagnosis Date    Anxiety     Depression     Fracture of right wrist     GERD (gastroesophageal reflux disease)     Hyperlipidemia     Left wrist fracture     CHILDHOOD    KURT (obstructive sleep apnea)     Severe recurrent major depression with psychotic features ([address] 4/2/2021    Suicidal ideation        Past Surgical History:        Procedure Laterality Date    COSMETIC SURGERY      face- CHILDHOOD    EYE SURGERY Bilateral     lasik    FRACTURE SURGERY Right     wrist    WISDOM TOOTH EXTRACTION         Social History:    Social History     Tobacco Use    Smoking status: Current Some Day Smoker     Types: Cigarettes    Smokeless tobacco: Never Used    Tobacco comment: SMOKES 3 CIGARETTES OR SO A DAY, BUT NOT EVERY DAY   Substance Use Topics    Alcohol use: Yes     Comment: occas wine                                Ready to quit: Not Answered  Counseling given: Not Answered  Comment: SMOKES 3 CIGARETTES OR SO A DAY, BUT NOT EVERY DAY      Vital Signs (Current):   Vitals:    04/14/21 0720   BP: 92/62   Pulse: 62   Resp: 16   Temp: 97.5 °F (36.4 °C)   TempSrc: Infrared   SpO2: 97%   Weight: 170 lb (77.1 kg)   Height: 5' 6\" (1.676 m)                                              BP Readings from Last 3 Encounters:   04/14/21 92/62   04/12/21 126/78       NPO Status: Time of last liquid consumption: 2100                        Time of last solid consumption: 2100                        Date of last liquid consumption: 04/13/21                        Date of last solid food consumption: 04/13/21    BMI:   Wt Readings from Last 3 Encounters:   04/14/21 170 lb (77.1 kg)   04/12/21 170 lb (77.1 kg)     Body mass index is 27.44 kg/m². CBC:   Lab Results   Component Value Date    WBC 5.9 04/05/2021    RBC 4.51 04/05/2021    HGB 13.9 04/05/2021    HCT 41.6 04/05/2021    MCV 92.3 04/05/2021    RDW 12.9 04/05/2021     04/05/2021       CMP:   Lab Results   Component Value Date     04/06/2021    K 4.6 04/06/2021     04/06/2021    CO2 27 04/06/2021    BUN 15 04/06/2021    CREATININE 1.08 04/06/2021    GFRAA >60 04/06/2021    LABGLOM 52 04/06/2021    GLUCOSE 110 04/06/2021    PROT 6.4 04/05/2021    CALCIUM 9.5 04/06/2021    BILITOT 0.29 04/05/2021    ALKPHOS 58 04/05/2021    AST 11 04/05/2021    ALT 15 04/05/2021       POC Tests: No results for input(s): POCGLU, POCNA, POCK, POCCL, POCBUN, POCHEMO, POCHCT in the last 72 hours.     Coags: No results found for: PROTIME, INR, APTT    HCG (If Applicable): No results found for: PREGTESTUR, PREGSERUM, HCG, HCGQUANT     ABGs: No results found for: PHART, PO2ART, GSC5UIQ, FKJ9DFJ, BEART, Q3CGKLLT     Type & Screen (If Applicable):  No results found for: LABABO, LABRH    Drug/Infectious Status (If Applicable):  No results found for: HIV, HEPCAB    COVID-19 Screening (If Applicable):   Lab Results   Component Value Date    COVID19 Not Detected 04/09/2021           Anesthesia Evaluation Patient summary reviewed and Nursing notes reviewed  Airway: Mallampati: II  TM distance: >3 FB   Neck ROM: full  Mouth opening: > = 3 FB Dental:          Pulmonary: breath sounds clear to auscultation  (+) sleep apnea: on CPAP,      (-) rhonchi, wheezes, rales and stridor                           Cardiovascular:        (-) murmur, weak pulses,  friction rub, systolic click, carotid bruit,  JVD and peripheral edema      Rhythm: regular  Rate: normal                    Neuro/Psych:   (+) psychiatric history:            GI/Hepatic/Renal:   (+) GERD:,           Endo/Other:                     Abdominal:           Vascular:                                        Anesthesia Plan      general     ASA 2       Induction: intravenous. Anesthetic plan and risks discussed with patient. Plan discussed with CRNA.                   Lili Monday, MD   4/14/2021

## 2021-04-14 NOTE — PROGRESS NOTES
BP CUFF DEFLATED LEFT ANKLE    PULSE WIDTH- 1.0  FREQUENCY- 40  DURATION % POWER- 30  CURRENT- 0.9  EEG- 43  MOTOR- 20  STATIC- 1420  DYNAMIC- 280    Bilateral ECT

## 2021-04-14 NOTE — PROGRESS NOTES
Pre ECT Assessment Note  Ute Johnston, [facility]  Psychiatry  4/14/2021      Stu Sosa  1960  [id number]      Subjective:     Patient is a 64 y.o.  female seen for an evaluation prior to today's electroconvulsive therapy treatment. Today is treatment number 4, utilizing bilateral.  This is course number 1. The patient has previously received no ECT. Charity Daugherty presents today with her . She reports that yesterday she was more anxious. Her  reports that she did start smoking cigarettes again. She reports that she is smoking 3 to 5 cigarettes daily to help with her anxiety. She continues to endorse depression and reports that she is just laying around. Her  reports that she remains mostly in bed and is not even watching television. She denies any paranoia or auditory/visual hallucinations. She continues to have thoughts that it would be \"okay if the good Lajuanda Corn takes me\". She is experiencing mild cognitive side effects from ECT, at this time they are not distressing. She reports that her memory was \"terrible\" prior to getting ECT. Screening Tools:    MADRS Score 4/14/2021 = 42      Patient Active Problem List    Diagnosis Date Noted    Severe recurrent major depression with psychotic features ([address] 04/02/2021    Major depression, single episode 04/01/2021     Past Medical History:   Diagnosis Date    Anxiety     Depression     Fracture of right wrist     GERD (gastroesophageal reflux disease)     Hyperlipidemia     Left wrist fracture     CHILDHOOD    KURT (obstructive sleep apnea)     Severe recurrent major depression with psychotic features ([address] 4/2/2021    Suicidal ideation       Past Surgical History:   Procedure Laterality Date    COSMETIC SURGERY      face- CHILDHOOD    EYE SURGERY Bilateral     lasik    FRACTURE SURGERY Right     wrist    WISDOM TOOTH EXTRACTION        Not in a hospital admission.   Allergies   Allergen Reactions    Bupropion       Social History     Tobacco Use    Smoking status: Current Some Day Smoker     Types: Cigarettes    Smokeless tobacco: Never Used    Tobacco comment: SMOKES 3 CIGARETTES OR SO A DAY, BUT NOT EVERY DAY   Substance Use Topics    Alcohol use: Yes     Comment: occas wine      Family History   Problem Relation Age of Onset    Bipolar Disorder Daughter         X4 DAUGHTERS, SOME HAVE BIPOLAR/ANXIETY/ADHD    Anxiety Disorder Daughter     ADHD Daughter           Review Of Systems:       Psychiatric Review Of Systems:  Positive for depression and lack of motivation/energy. Sleep is appropriate. Appetite is fair. Memory is poor. Denies suicidal ideation, intent or plan and continues to verbally contract for safety. Objective:       Mental Status Evaluation:  Appearance:  age appropriate and casually dressed   Behavior:   Calm, cooperative   Speech:  normal pitch, normal volume and soft   Mood:   Depressed   Affect:  mood-congruent   Thought Process:  goal directed   Thought Content:  suicidal ideation improved   Sensorium:  person, place, time/date and situation   Cognition:   Impaired   Insight:  good   Judgment:  good     Assessment:     Diagnosis: Major depressive disorder, recurrent, severe with psychotic symptoms    Plan:     Continue outpatient ECT 3 times a week, she was provided an MADRS to take home and complete tomorrow evening. She is aware that she should return this depression screening scale on Friday at her next treatment. Update consent, labs and H&P every 30 days while undergoing ECT treatment. Patient verbalizes understanding of risks/benefits/alternatives to ECT. Last informed consent signed on 4/7/2021.

## 2021-04-14 NOTE — PROCEDURES
Bilateral ECT Procedure Report  Lb Stubbs   4/14/2021  1960         Attending:Duc Wagner MD  Preprocedure diagnosis: Major depressive disorder, recurrent, severe with psychotic symptoms (F33.3)  Postprocedure diagnosis: SAME AS PRE-PROCEDURE DIAGNOSIS  Treatment Number: This is treatment # 4   Patient Status: Outpatient   Type of ECT: Bilateral Brief Pulse  Medications  Preprocedure: Tylenol 650mg  Anesthetic: Methohexital 80 mg  Paralytic: Succinylcholine 80 mg  Post procedure: none needed     Cuff placement: Left Lower Extremity    Thymatron Settings 1st Stimulus:     Pulse width: 1.0 ms   Frequency:  40 hz   % Power:   30 %   Static Impedance: 1420 ohms             Dynamic Impedance: 280 ohms             Motor Seizure Duration: 20 seconds  EEG Seizure Duration: 43 seconds                 The patient's ECT treatment was performed using Thymatron machine  . Timeout:  Time out was performed using two patient identifiers and confirmation of procedure. Patient Preparation: Preprocedure documentation, labs, H&P were all verified and reviewed. The patient was placed in supine position. EEG leads were placed for monitoring of seizure. [place] areas bilaterally cleaned and prepped. Pre-Tac solution was applied over stimulus electrode site. Thymapad's then applied to stimulus electrode site bilaterally with the center of the lead placed approximately 1 inch superior to the midpoint of line between canthus and the tragus bilaterally. The patient was pre-oxygenated. Procedure in detail: Medications were administered by anesthesia using intravenous access at the doses listed previously in this summary. Anesthetic administered and once confirmation the patient is anesthetized, the patient's blood pressure cuff on lower extremity was inflated to 100mmHg in excess of patient's blood pressure. At this time, the neuromuscular blockade was administered intravenously by anesthesia.   Upper extremity fasciculation were verified followed by lower extremity fasciculation. Once fasciculations terminated, confirmation of affective neuromuscular blockade demonstrated by absence of withdrawal reflex. Bite blocks were put in place. Static impedance was tested and within appropriate limits. Thymatron settings were confirmed with nursing staff. Staff was cleared from the patient and dose of ECT stimulus was delivered. Motor seizure activity  was observed at duration noted and terminated. EEG seizure activity was observed of duration noted that was confirmed via monitoring EEG and terminated with appropriate postictal suppression noted on EEG. Static impedance and dynamic impedance were documented. Tourniquet on  lower extremity was released and recovery procedures initiated. The patient was mask ventilated during the procedure with no significant drops in oxygenation saturation and tolerated the procedure well without any notable adverse effects. Condition: The patient was in stable condition with stable vital signs and able to follow commands when moved to recovery.

## 2021-04-14 NOTE — H&P
HISTORY and [id number]       NAME:  Radha Ames  MRN: [id number]   YOB: 1960   Date: 4/14/2021   Age: 64 y.o. Gender: female       COMPLAINT AND PRESENT HISTORY:     Radha Ames is 64 y.o.,  female, here for ECT treatment. Pre diagnosis: severe recurrent major depression with psychotic features disorder   HPI :  Last ECT treatment was 4/12/2021, Pt tolerated last treatment well. Patient states, until today she did not notice any improvement yet. This will be the 4th treatment . 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 Mood is rated at 6 /10. on 0-10 scale, 10 being the best/0 being the worst.  Pt states last night she has feeling of  anxiety, she could not sleep, can not focus,  no inpatient and she feel  low energy. Patient's sleep has been okay with medication ,  Appetite has been very bad. Pt denies  Suicidal/  homicidal. Pt denies auditory/visual hallucinations. Pt has been taking psychiatric medications as prescribed. Pt does not have any other somatic complaints at this time. Pt denies fever/chills, chest pain or SOB. Pt Npo since the past midnight, she took tylenol today with sip of water  Pt denies any problem with anesthesia, no hx of MRSA infection     PMHx:   Review of additional significant medical hx:  GERD: Denies dysphagia, pharyngitis, voice change.   Current medications r/t condition: OMEPRAZOLE     KURT: She does use CPAP- has not used consistently recently.      HLD:  Current medications r/t condition: LIPITOR       PAST MEDICAL HISTORY     Past Medical History:   Diagnosis Date    Anxiety     Depression     Fracture of right wrist     GERD (gastroesophageal reflux disease)     Hyperlipidemia     Left wrist fracture     CHILDHOOD    KURT (obstructive sleep apnea)     Severe recurrent major depression with psychotic features ([facility] 4/2/2021    Suicidal ideation        SURGICAL HISTORY       Past Surgical History:   Procedure Laterality Date    COSMETIC SURGERY      face- CHILDHOOD    EYE SURGERY Bilateral     lasik    FRACTURE SURGERY Right     wrist    WISDOM TOOTH EXTRACTION         FAMILY HISTORY       Family History   Problem Relation Age of Onset    Bipolar Disorder Daughter         X4 DAUGHTERS, SOME HAVE BIPOLAR/ANXIETY/ADHD    Anxiety Disorder Daughter     ADHD Daughter        SOCIAL HISTORY       Social History     Socioeconomic History    Marital status:      Spouse name: Not on file    Number of children: Not on file    Years of education: Not on file    Highest education level: Not on file   Occupational History    Not on file   Social Needs    Financial resource strain: Not on file    Food insecurity     Worry: Not on file     Inability: Not on file    Transportation needs     Medical: Not on file     Non-medical: Not on file   Tobacco Use    Smoking status: Current Some Day Smoker     Types: Cigarettes    Smokeless tobacco: Never Used    Tobacco comment: SMOKES 3 CIGARETTES OR SO A DAY, BUT NOT EVERY DAY   Substance and Sexual Activity    Alcohol use: Yes     Comment: occas wine    Drug use: Never    Sexual activity: Yes     Partners: Male   Lifestyle    Physical activity     Days per week: Not on file     Minutes per session: Not on file    Stress: Not on file   Relationships    Social connections     Talks on phone: Not on file     Gets together: Not on file     Attends Jewish service: Not on file     Active member of club or organization: Not on file     Attends meetings of clubs or organizations: Not on file     Relationship status: Not on file    Intimate partner violence     Fear of current or ex partner: Not on file     Emotionally abused: Not on file     Physically abused: Not on file     Forced sexual activity: Not on file   Other Topics Concern    Not on file   Social History Narrative    Not on file           REVIEW OF SYSTEMS      Allergies   Allergen Reactions    Bupropion        Current Outpatient Medications on File Prior to Encounter   Medication Sig Dispense Refill    acetaminophen (TYLENOL) 500 MG tablet Take 500 mg by mouth every 6 hours as needed for Pain      atorvastatin (LIPITOR) 10 MG tablet Take 2 tablets by mouth daily 30 tablet 0    busPIRone (BUSPAR) 15 MG tablet Take 15 mg by mouth 2 times daily 60 tablet 0    hydrOXYzine (ATARAX) 50 MG tablet Take 1 tablet by mouth 3 times daily as needed for Anxiety 30 tablet 0    cloNIDine (CATAPRES) 0.1 MG tablet Take 1 tablet by mouth nightly 60 tablet 3    lithium (LITHOBID) 300 MG extended release tablet Take 1 tablet by mouth daily 60 tablet 3    omeprazole (PRILOSEC) 20 MG delayed release capsule Take 1 capsule by mouth daily 30 capsule 3    sertraline (ZOLOFT) 50 MG tablet Take 3 tablets by mouth daily 30 tablet 3    traZODone (DESYREL) 50 MG tablet Take 1 tablet by mouth nightly as needed for Sleep 30 tablet 0     No current facility-administered medications on file prior to encounter. Negative except for what is mentioned in the HPI. GENERAL PHYSICAL EXAM     Vitals: see nursing flow sheet for vital signs     GENERAL APPEARANCE:   Stu Sosa is 64 y.o.,  female,nourished, conscious, alert. Does not appear to be distress or pain at this time. SKIN:  Warm, dry, no cyanosis or jaundice. HEAD:  Normocephalic, atraumatic, no swelling or tenderness. EYES:  Pupils equal, reactive to light. EARS:  No discharge, no marked hearing loss. NOSE:  No rhinorrhea, epistaxis or septal deformity. THROAT:  Not congested. No ulceration bleeding or discharge. NECK:  No stiffness, trachea central.  No palpable masses or L.N.                 CHEST:  Symmetrical and equal on expansion.

## 2021-04-16 ENCOUNTER — ANESTHESIA (OUTPATIENT)
Dept: POSTOP/PACU | Age: 61
End: 2021-04-16

## 2021-04-16 ENCOUNTER — ANESTHESIA EVENT (OUTPATIENT)
Dept: POSTOP/PACU | Age: 61
End: 2021-04-16

## 2021-04-16 ENCOUNTER — APPOINTMENT (OUTPATIENT)
Dept: POSTOP/PACU | Age: 61
End: 2021-04-16
Payer: COMMERCIAL

## 2021-04-16 ENCOUNTER — HOSPITAL ENCOUNTER (OUTPATIENT)
Dept: POSTOP/PACU | Age: 61
Discharge: HOME | End: 2021-04-16
Payer: COMMERCIAL

## 2021-04-16 VITALS
SYSTOLIC BLOOD PRESSURE: 115 MMHG | BODY MASS INDEX: 27.32 KG/M2 | HEIGHT: 66 IN | WEIGHT: 170 LBS | TEMPERATURE: 98.2 F | HEART RATE: 74 BPM | DIASTOLIC BLOOD PRESSURE: 73 MMHG | OXYGEN SATURATION: 94 % | RESPIRATION RATE: 18 BRPM

## 2021-04-16 VITALS — OXYGEN SATURATION: 99 %

## 2021-04-16 DIAGNOSIS — Z01.818 PREOPERATIVE CLEARANCE: Primary | ICD-10-CM

## 2021-04-16 DIAGNOSIS — F33.3 SEVERE RECURRENT MAJOR DEPRESSION WITH PSYCHOTIC FEATURES (HCC): Primary | ICD-10-CM

## 2021-04-16 DIAGNOSIS — Z01.818 PREOPERATIVE CLEARANCE: ICD-10-CM

## 2021-04-16 PROCEDURE — 87636 SARSCOV2 & INF A&B AMP PRB: CPT

## 2021-04-16 PROCEDURE — 2580000003 HC RX 258: Performed by: ANESTHESIOLOGY

## 2021-04-16 PROCEDURE — 3700000000 HC ANESTHESIA ATTENDED CARE

## 2021-04-16 PROCEDURE — 90870 ELECTROCONVULSIVE THERAPY: CPT

## 2021-04-16 PROCEDURE — 3700000001 HC ADD 15 MINUTES (ANESTHESIA)

## 2021-04-16 PROCEDURE — 7100000001 HC PACU RECOVERY - ADDTL 15 MIN

## 2021-04-16 PROCEDURE — 90870 ELECTROCONVULSIVE THERAPY: CPT | Performed by: PSYCHIATRY & NEUROLOGY

## 2021-04-16 PROCEDURE — 7100000000 HC PACU RECOVERY - FIRST 15 MIN

## 2021-04-16 PROCEDURE — 6360000002 HC RX W HCPCS

## 2021-04-16 PROCEDURE — 2500000003 HC RX 250 WO HCPCS

## 2021-04-16 RX ORDER — ONDANSETRON 2 MG/ML
4 INJECTION INTRAMUSCULAR; INTRAVENOUS
Status: ACTIVE | OUTPATIENT
Start: 2021-04-16 | End: 2021-04-16

## 2021-04-16 RX ORDER — LABETALOL HYDROCHLORIDE 5 MG/ML
5 INJECTION, SOLUTION INTRAVENOUS EVERY 10 MIN PRN
Status: DISCONTINUED | OUTPATIENT
Start: 2021-04-16 | End: 2021-04-17 | Stop reason: HOSPADM

## 2021-04-16 RX ORDER — SODIUM CHLORIDE, SODIUM LACTATE, POTASSIUM CHLORIDE, CALCIUM CHLORIDE 600; 310; 30; 20 MG/100ML; MG/100ML; MG/100ML; MG/100ML
INJECTION, SOLUTION INTRAVENOUS CONTINUOUS
Status: DISCONTINUED | OUTPATIENT
Start: 2021-04-16 | End: 2021-04-17 | Stop reason: HOSPADM

## 2021-04-16 RX ORDER — SUCCINYLCHOLINE/SOD CL,ISO/PF 100 MG/5ML
SYRINGE (ML) INTRAVENOUS PRN
Status: DISCONTINUED | OUTPATIENT
Start: 2021-04-16 | End: 2021-04-16 | Stop reason: SDUPTHER

## 2021-04-16 RX ORDER — DIPHENHYDRAMINE HYDROCHLORIDE 50 MG/ML
12.5 INJECTION INTRAMUSCULAR; INTRAVENOUS
Status: ACTIVE | OUTPATIENT
Start: 2021-04-16 | End: 2021-04-16

## 2021-04-16 RX ORDER — SUCCINYLCHOLINE/SOD CL,ISO/PF 200MG/10ML
SYRINGE (ML) INTRAVENOUS
Status: COMPLETED
Start: 2021-04-16 | End: 2021-04-16

## 2021-04-16 RX ORDER — MEPERIDINE HYDROCHLORIDE 25 MG/ML
12.5 INJECTION INTRAMUSCULAR; INTRAVENOUS; SUBCUTANEOUS EVERY 5 MIN PRN
Status: DISCONTINUED | OUTPATIENT
Start: 2021-04-16 | End: 2021-04-17 | Stop reason: HOSPADM

## 2021-04-16 RX ORDER — MORPHINE SULFATE 2 MG/ML
2 INJECTION, SOLUTION INTRAMUSCULAR; INTRAVENOUS EVERY 5 MIN PRN
Status: DISCONTINUED | OUTPATIENT
Start: 2021-04-16 | End: 2021-04-17 | Stop reason: HOSPADM

## 2021-04-16 RX ADMIN — Medication 80 MG: at 08:00

## 2021-04-16 RX ADMIN — SODIUM CHLORIDE, POTASSIUM CHLORIDE, SODIUM LACTATE AND CALCIUM CHLORIDE: 600; 310; 30; 20 INJECTION, SOLUTION INTRAVENOUS at 07:35

## 2021-04-16 ASSESSMENT — PAIN SCALES - GENERAL: PAINLEVEL_OUTOF10: 0

## 2021-04-16 ASSESSMENT — ENCOUNTER SYMPTOMS
SHORTNESS OF BREATH: 0
STRIDOR: 0

## 2021-04-16 ASSESSMENT — LIFESTYLE VARIABLES: SMOKING_STATUS: 0

## 2021-04-16 NOTE — PROCEDURES
Bilateral ECT Procedure Report  Chuy Solomon   4/16/2021  1960         Attending:Duc Wagner MD  Preprocedure diagnosis: Major depressive disorder, recurrent, severe with psychotic symptoms (F33.3)  Postprocedure diagnosis: SAME AS PRE-PROCEDURE DIAGNOSIS  Treatment Number: This is treatment # 5   Patient Status: Outpatient   Type of ECT: Bilateral Brief Pulse  Medications  Preprocedure: Tylenol 650mg  Anesthetic: Methohexital 80 mg  Paralytic: Succinylcholine 80 mg  Post procedure: none needed     Cuff placement: Left Lower Extremity    Thymatron Settings 1st Stimulus:     Pulse width: 1.0 ms   Frequency:  40 hz   % Power:   30 %   Static Impedance: 1270 ohms             Dynamic Impedance: 260 ohms             Motor Seizure Duration: 20 seconds  EEG Seizure Duration: 44 seconds                 The patient's ECT treatment was performed using Thymatron machine  . Timeout:  Time out was performed using two patient identifiers and confirmation of procedure. Patient Preparation: Preprocedure documentation, labs, H&P were all verified and reviewed. The patient was placed in supine position. EEG leads were placed for monitoring of seizure. [place] areas bilaterally cleaned and prepped. Pre-Tac solution was applied over stimulus electrode site. Thymapad's then applied to stimulus electrode site bilaterally with the center of the lead placed approximately 1 inch superior to the midpoint of line between canthus and the tragus bilaterally. The patient was pre-oxygenated. Procedure in detail: Medications were administered by anesthesia using intravenous access at the doses listed previously in this summary. Anesthetic administered and once confirmation the patient is anesthetized, the patient's blood pressure cuff on lower extremity was inflated to 100mmHg in excess of patient's blood pressure. At this time, the neuromuscular blockade was administered intravenously by anesthesia.   Upper extremity fasciculation were verified followed by lower extremity fasciculation. Once fasciculations terminated, confirmation of affective neuromuscular blockade demonstrated by absence of withdrawal reflex. Bite blocks were put in place. Static impedance was tested and within appropriate limits. Thymatron settings were confirmed with nursing staff. Staff was cleared from the patient and dose of ECT stimulus was delivered. Motor seizure activity  was observed at duration noted and terminated. EEG seizure activity was observed of duration noted that was confirmed via monitoring EEG and terminated with appropriate postictal suppression noted on EEG. Static impedance and dynamic impedance were documented. Tourniquet on  lower extremity was released and recovery procedures initiated. The patient was mask ventilated during the procedure with no significant drops in oxygenation saturation and tolerated the procedure well without any notable adverse effects. Condition: The patient was in stable condition with stable vital signs and able to follow commands when moved to recovery.

## 2021-04-16 NOTE — PROGRESS NOTES
Pre ECT Assessment Note  Ute Johnston, [facility]  Psychiatry  4/16/2021      Stu Sosa  1960  [id number]      Subjective:     Patient is a 64 y.o.  female seen for an evaluation prior to today's electroconvulsive therapy treatment. Today is treatment number 5, utilizing bilateral.  This is course number 1. The patient has previously received no ECT. Charity Daugherty is present for outpatient ECT today, accompanied by her . She completed the MADRS at home, her score decreased to 25. We compared the results to her previous score, she continues to feel lack of motivation and energy are her biggest concern. She reports that she remains isolated in bed as much as possible. Her  agrees that he is having difficulty getting her up and motivated. She continues to endorse a depressed mood though reports that she was able to enjoy her granddaughters when they came to visit yesterday.  reports that she also had a therapy session yesterday, he reports that he was able to attend this with her. He states that he expressed his frustration that over the past year it seems that they have not tried any new measures to try and improve her depression. He requested additional information about her inpatient hospitalization be provided to her primary psychiatrist Mceknna Krishnan In [place] was obtained and uploaded in her chart. We will reach out to their office next week to find out what information will be helpful for continuity of care.       Screening Tools:    MADRS Score 4/16/2021 = 42, repeat score 4/15/2021 = 25      Patient Active Problem List    Diagnosis Date Noted    Severe recurrent major depression with psychotic features ([facility] 04/02/2021    Major depression, single episode 04/01/2021     Past Medical History:   Diagnosis Date    Anxiety     Depression     Fracture of right wrist     GERD (gastroesophageal reflux disease)     Hyperlipidemia     Left wrist fracture     CHILDHOOD    KURT (obstructive sleep apnea)     Severe recurrent major depression with psychotic features ([facility] [facility] 4/2/2021    Suicidal ideation       Past Surgical History:   Procedure Laterality Date    COSMETIC SURGERY      face- CHILDHOOD    EYE SURGERY Bilateral     lasik    FRACTURE SURGERY Right     wrist    WISDOM TOOTH EXTRACTION        Not in a hospital admission. Allergies   Allergen Reactions    Bupropion       Social History     Tobacco Use    Smoking status: Current Some Day Smoker     Types: Cigarettes    Smokeless tobacco: Never Used    Tobacco comment: SMOKES 3 CIGARETTES OR SO A DAY, BUT NOT EVERY DAY   Substance Use Topics    Alcohol use: Yes     Comment: occas wine      Family History   Problem Relation Age of Onset    Bipolar Disorder Daughter         X4 DAUGHTERS, SOME HAVE BIPOLAR/ANXIETY/ADHD    Anxiety Disorder Daughter     ADHD Daughter           Review Of Systems:       Psychiatric Review Of Systems:  Positive for depression and lack of motivation/energy. Sleep is appropriate. Appetite is decreased. Memory is poor, though patient reports this as baseline. Denies suicidal ideation, intent or plan and continues to verbally contract for safety. Objective:       Mental Status Evaluation:  Appearance:  age appropriate and casually dressed   Behavior:   Calm, cooperative   Speech:  normal pitch, normal volume and soft   Mood:   Depressed, withdrawn   Affect:  mood-congruent   Thought Process:  goal directed   Thought Content:  suicidal ideation improved   Sensorium:  person, place, time/date and situation   Cognition:   Impaired   Insight:  good   Judgment:  good     Assessment:     Diagnosis: Major depressive disorder, recurrent, severe with psychotic symptoms    Plan:     Decrease ECT frequency to twice a week starting next week, we will monitor for stability and continued improvement in symptoms. JASON was obtained and placed in patient chart.   We will prepare records to send to her primary psychiatrist for continuity of care. Update consent, labs and H&P every 30 days while undergoing ECT treatment. Patient verbalizes understanding of risks/benefits/alternatives to ECT. Last informed consent signed on 4/7/2021.

## 2021-04-16 NOTE — ANESTHESIA PRE PROCEDURE
Department of Anesthesiology  Preprocedure Note       Name:  Red Knapp   Age:  64 y.o.  :  1960                                          MRN:  [id number]         Date:  2021      Surgeon: Abiel Roberson  Procedure: ECT  Medications prior to admission:   Prior to Admission medications    Medication Sig Start Date End Date Taking?  Authorizing Provider   acetaminophen (TYLENOL) 500 MG tablet Take 500 mg by mouth every 6 hours as needed for Pain   Yes Historical Provider, MD   atorvastatin (LIPITOR) 10 MG tablet Take 2 tablets by mouth daily 21  Yes Adam Varner MD   busPIRone (BUSPAR) 15 MG tablet Take 15 mg by mouth 2 times daily 21  Yes Adam Varner MD   hydrOXYzine (ATARAX) 50 MG tablet Take 1 tablet by mouth 3 times daily as needed for Anxiety 21 Yes Adam Varner MD   cloNIDine (CATAPRES) 0.1 MG tablet Take 1 tablet by mouth nightly 21  Yes Adam Varner MD   lithium (LITHOBID) 300 MG extended release tablet Take 1 tablet by mouth daily 21  Yes Adam Varner MD   omeprazole (PRILOSEC) 20 MG delayed release capsule Take 1 capsule by mouth daily 21  Yes Adam Varner MD   sertraline (ZOLOFT) 50 MG tablet Take 3 tablets by mouth daily 21  Yes Adam Varner MD   traZODone (DESYREL) 50 MG tablet Take 1 tablet by mouth nightly as needed for Sleep 21  Yes Adam Varner MD       Current medications:    Current Outpatient Medications   Medication Sig Dispense Refill    acetaminophen (TYLENOL) 500 MG tablet Take 500 mg by mouth every 6 hours as needed for Pain      atorvastatin (LIPITOR) 10 MG tablet Take 2 tablets by mouth daily 30 tablet 0    busPIRone (BUSPAR) 15 MG tablet Take 15 mg by mouth 2 times daily 60 tablet 0    hydrOXYzine (ATARAX) 50 MG tablet Take 1 tablet by mouth 3 times daily as needed for Anxiety 30 tablet 0    cloNIDine (CATAPRES) 0.1 MG tablet Take 1 tablet by mouth nightly 60 tablet 3    lithium (LITHOBID) 300 MG extended release tablet Take 1 tablet by mouth daily 60 tablet 3    omeprazole (PRILOSEC) 20 MG delayed release capsule Take 1 capsule by mouth daily 30 capsule 3    sertraline (ZOLOFT) 50 MG tablet Take 3 tablets by mouth daily 30 tablet 3    traZODone (DESYREL) 50 MG tablet Take 1 tablet by mouth nightly as needed for Sleep 30 tablet 0     Current Facility-Administered Medications   Medication Dose Route Frequency Provider Last Rate Last Admin    lactated ringers infusion   Intravenous Continuous Johann Hartley  mL/hr at 04/16/21 0735 New Bag at 04/16/21 0735       Allergies:     Allergies   Allergen Reactions    Bupropion        Problem List:    Patient Active Problem List   Diagnosis Code    Major depression, single episode F32.9    Severe recurrent major depression with psychotic features ([address] F33.3       Past Medical History:        Diagnosis Date    Anxiety     Depression     Fracture of right wrist     GERD (gastroesophageal reflux disease)     Hyperlipidemia     Left wrist fracture     CHILDHOOD    KURT (obstructive sleep apnea)     Severe recurrent major depression with psychotic features ([address] 4/2/2021    Suicidal ideation        Past Surgical History:        Procedure Laterality Date    COSMETIC SURGERY      face- CHILDHOOD    EYE SURGERY Bilateral     lasik    FRACTURE SURGERY Right     wrist    WISDOM TOOTH EXTRACTION         Social History:    Social History     Tobacco Use    Smoking status: Current Some Day Smoker     Types: Cigarettes    Smokeless tobacco: Never Used    Tobacco comment: SMOKES 3 CIGARETTES OR SO A DAY, BUT NOT EVERY DAY   Substance Use Topics    Alcohol use: Yes     Comment: occas wine                                Ready to quit: Not Answered  Counseling given: Not Answered  Comment: SMOKES 3 CIGARETTES OR SO A DAY, BUT NOT EVERY DAY      Vital Signs (Current):   Vitals:    04/16/21 0722 04/16/21 0723   BP:  113/72   Pulse:  58   Resp:  16   Temp:  97.5 °F anesthetic complications:   Airway: Mallampati: II  TM distance: >3 FB   Neck ROM: full  Mouth opening: > = 3 FB Dental: normal exam         Pulmonary:normal exam  breath sounds clear to auscultation  (+) sleep apnea:      (-) pneumonia, COPD, asthma, shortness of breath, recent URI, rhonchi, wheezes, rales, stridor and not a current smoker                           Cardiovascular:Negative CV ROS  Exercise tolerance: good (>4 METS),       (-) pacemaker, hypertension, valvular problems/murmurs, past MI, CAD, CABG/stent, dysrhythmias,  angina,  CHF, orthopnea, PND,  CABEZAS, murmur, weak pulses,  friction rub, systolic click, carotid bruit,  JVD and peripheral edema    ECG reviewed  Rhythm: regular  Rate: normal           Beta Blocker:  Not on Beta Blocker         Neuro/Psych:   (+) psychiatric history: stable with treatmentdepression/anxiety             GI/Hepatic/Renal:   (+) GERD: no interval change,      (-) hiatal hernia, PUD, hepatitis, liver disease, no renal disease, bowel prep and no morbid obesity       Endo/Other: Negative Endo/Other ROS   (+) no malignancy/cancer. (-) diabetes mellitus, hypothyroidism, hyperthyroidism, blood dyscrasia, arthritis, no electrolyte abnormalities, no malignancy/cancer               Abdominal:           Vascular: negative vascular ROS. - PVD, DVT and PE. Anesthesia Plan      general     ASA 2       Induction: intravenous. MIPS: Postoperative opioids intended and Prophylactic antiemetics administered. Anesthetic plan and risks discussed with patient. Plan discussed with CRNA.                   Marc Molina MD   4/16/2021

## 2021-04-16 NOTE — PROGRESS NOTES
BP CUFF DEFLATED LEFT ANKLE    PULSE WIDTH- 1.0  FREQUENCY- 40  DURATION % POWER- 30  CURRENT- 0.9  EEG- 44  MOTOR- 20  STATIC- 1270  DYNAMIC- 260

## 2021-04-16 NOTE — PROGRESS NOTES
Thorne and Asberg Depression Rating Scale  4/16/2021      Lon Brasher  1960  [id number]    Total Score = 25      1. Apparent Sadness = 3 (Representing despondency, gloom and despair, reflected in speech, facial expression, and posture)    Rate by depth and inability to brighten up.  0 - No sadness  1  2 - Looks dispirited but does brighten up without difficulty. 3  4 - Appears sad and unhappy most of the time. 5   6 - Looks miserable all the time. Extremely despondent. 2.   Reported Sadness = 3 (Representing reports of depressed mood, regardless of whether it is reflected in appearance or not. Includes low spirits, despondency or the feeling of being beyond help and without hope)      Rate according to intensity, duration and the extent to which the mood is reported to be influenced by events. 0 - Occasional sadness in keeping with the circumstances    1    2 - Sad or low but brightens up without difficulty. 3    4 - Pervasive feelings of sadness or gloominess. The mood is still influenced by external circumstances. 5    6 - Continuous or unvarying sadness, misery or despondency. 3.   Inner Tension = 3 (Representing feelings of ill-defined discomfort, edginess, inner turmoil, mental tension mounting to either panic, dread or anguish)        Rate according to intensity, frequency, duration and the extent of reassurance called for. 0 - Placid. Only fleeting inner tension. 1    2 - Occasional feelings of edginess and ill defined discomfort. 3    4 - Continuous feelings of inner tension or intermittent panic which the patient can only master with some difficulty. 5    6 - Unrelenting dread or anguish. Overwhelming panic.     4.   Reduced Sleep = 2 (Representing the experience of reduced duration or depth of sleep compared to the subject's own normal pattern when well)      0 - Sleeps as usual.    1    2 - Slightly difficulty dropping off to sleep or slightly reduced, light or fitful sleep.    3    4 - Sleep reduced or broken by at least two hours. 5    6 - Less than two or three hours sleep. 5.   Reduced Appetite = 2 (Representing the feeling of a loss of appetite compared with when well)      Rate by loss of desire for food or the need to force oneself to eat.    0 - Normal or increased appetite. 1    2 - Slightly reduced appetite. 3    4 - No appetite. Food is tasteless. 5    6 - Needs persuasion to eat at all. 6.   Concentration Difficulties = 4 (Representing difficulties in collecting one's thoughts mounting to incapacitating lack of concentration)        Rate according to intensity, frequency, and degree of incapacity produced. 0 - No difficulties in concentrating. 1    2 - Occasional difficulties in collecting one's thoughts. 3    4 - Difficulties in concentrating and sustaining thought which reduces ability to read or hold a conversation. 5    6 - Unable to read or converse without great difficulty. 7.   Lassitude = 2 (Representing a difficulty getting started or slowness initiating and performing everyday activities)      0 - Hardly any difficulty in getting started. No sluggishness. 1    2 - Difficulties in starting activities. 3    4 - Difficulties in starting simple routine activities which are carried out with effort. 5    6 - Complete lassitude. Unable to do anything without help. 8.   Inability to Feel = 2 (Representing the subjective experience of reduced interest in the surroundings, or activities that normally give pleasure. The ability to react with adequate emotion to circumstances or people is reduced)      0 - Normal interest in the surroundings and in other people. 1    2 - Reduced ability to enjoy usual interests. 3    4 - Loss of interest in the surroundings. Loss of feelings or friends and acquaintances.     5    6 - The experience of being emotionally paralyzed, inability to feel anger, grief or pleasure and a complete or even painful failure to feel for close relatives and friends. 9.   Pessimistic Thoughts = 2 (Representing thoughts of guilt, inferiority, self-reproach, sinfulness, remorse and ruin)      0 - No pessimistic thoughts. 1    2 - Fluctuating ideas of failure, self-reproach or self depreciation. 3    4 - Persistent self-accusations, or definite but still rational ideas of guilt or sin. Increasingly pessimistic about the future. 5    6 - Delusions of ruin, remorse or unredeemable sin. Self-accusations which are absurd and unshakable. 10.  Suicidal Thoughts = 2 (Representing the feeling that life is not worth living, that a natural death would be welcome, suicidal thoughts, and preparations for suicide)      Suicidal attempts should not in themselves influence the rating. 0 - Enjoys life or takes it as it comes. 1    2 - Weary of life. Only fleeting suicidal thoughts. 3    4 - Probably better off dead. Suicidal thoughts are common, and suicide is considered as a possible solution, but without specific plans or intention. 5    6 - Explicit plans for suicide when there is an opportunity. Active preparation for suicide.

## 2021-04-16 NOTE — H&P
HISTORY and [address]       NAME:  Yuliya Herzog  MRN: [id number]   YOB: 1960   Date: 4/16/2021   Age: 64 y.o. Gender: female     H&P Update Note    H&P from 04/14/2021 reviewed and updated. Patient examined. INTERVAL HISTORY:     Patient is feeling well today, denies any fever/chills, chest pain, shortness of breath. No interval changes. the last time she has ECT was 4/14//2021 pt tolerated last treatment well,  Patient states, still until today she did not notice any improvement yet. This will be the 5th treatment . Patient has complaints of some short term memory loss. 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 Mood today  is rated at 6 /10. on 0-10 scale, 10 being the best/0 being the worst  Patient's sleep has been okay with medication ,  Appetite has been very bad. Pt denies  Suicidal/  homicidal. Pt denies auditory/visual hallucinations. Pt has been taking psychiatric medications as prescribed. Pt does not have any other somatic complaints at this time. No interval changes to past medical history, social history, family history. Review of systems as stated above and otherwise negative. PHYSICAL EXAM:     Vitals: see nursing flow sheet for vital signs     Patient is alert and oriented, in no distress. Heart rate and rhythm are regular. Lungs clear to auscultation bilaterally. Abdomen is soft, non tender. No pedal edema. No interval changes. I concur with the findings. William Ahumada, APRN - CNP on 4/16/2021 at 7:03 AM        HISTORY and [address]       NAME:  Yuliya Herzog  MRN: [id number]   YOB: 1960   Date: 4/16/2021   Age: 64 y.o. Gender: female       COMPLAINT AND PRESENT HISTORY:     Yuliya Herzog is 64 y.o.,  female, here for ECT treatment.        Pre diagnosis: severe recurrent major depression with psychotic features disorder   HPI :  Last ECT treatment was 4/12/2021, Pt tolerated last treatment well. Patient states, until today she did not notice any improvement yet. This will be the 4th treatment . 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 Mood is rated at 6 /10. on 0-10 scale, 10 being the best/0 being the worst.  Pt states last night she has feeling of  anxiety, she could not sleep, can not focus,  no inpatient and she feel  low energy. Patient's sleep has been okay with medication ,  Appetite has been very bad. Pt denies  Suicidal/  homicidal. Pt denies auditory/visual hallucinations. Pt has been taking psychiatric medications as prescribed. Pt does not have any other somatic complaints at this time. Pt denies fever/chills, chest pain or SOB. Pt Npo since the past midnight, she took tylenol today with sip of water  Pt denies any problem with anesthesia, no hx of MRSA infection     PMHx:   Review of additional significant medical hx:  GERD: Denies dysphagia, pharyngitis, voice change.   Current medications r/t condition: OMEPRAZOLE     KURT: She does use CPAP- has not used consistently recently.      HLD:  Current medications r/t condition: LIPITOR       PAST MEDICAL HISTORY     Past Medical History:   Diagnosis Date    Anxiety     Depression     Fracture of right wrist     GERD (gastroesophageal reflux disease)     Hyperlipidemia     Left wrist fracture     CHILDHOOD    KURT (obstructive sleep apnea)     Severe recurrent major depression with psychotic features ([facility] [address] 4/2/2021    Suicidal ideation        SURGICAL HISTORY       Past Surgical History:   Procedure Laterality Date    COSMETIC SURGERY      face- CHILDHOOD    EYE SURGERY Bilateral     lasik    FRACTURE SURGERY Right     wrist    WISDOM TOOTH EXTRACTION         FAMILY HISTORY       Family History   Problem Relation Age of Onset    Bipolar Disorder Daughter X4 DAUGHTERS, SOME HAVE BIPOLAR/ANXIETY/ADHD    Anxiety Disorder Daughter     ADHD Daughter        SOCIAL HISTORY       Social History     Socioeconomic History    Marital status:      Spouse name: Not on file    Number of children: Not on file    Years of education: Not on file    Highest education level: Not on file   Occupational History    Not on file   Social Needs    Financial resource strain: Not on file    Food insecurity     Worry: Not on file     Inability: Not on file    Transportation needs     Medical: Not on file     Non-medical: Not on file   Tobacco Use    Smoking status: Current Some Day Smoker     Types: Cigarettes    Smokeless tobacco: Never Used    Tobacco comment: SMOKES 3 CIGARETTES OR SO A DAY, BUT NOT EVERY DAY   Substance and Sexual Activity    Alcohol use: Yes     Comment: occas wine    Drug use: Never    Sexual activity: Yes     Partners: Male   Lifestyle    Physical activity     Days per week: Not on file     Minutes per session: Not on file    Stress: Not on file   Relationships    Social connections     Talks on phone: Not on file     Gets together: Not on file     Attends Confucianist service: Not on file     Active member of club or organization: Not on file     Attends meetings of clubs or organizations: Not on file     Relationship status: Not on file    Intimate partner violence     Fear of current or ex partner: Not on file     Emotionally abused: Not on file     Physically abused: Not on file     Forced sexual activity: Not on file   Other Topics Concern    Not on file   Social History Narrative    Not on file           REVIEW OF SYSTEMS      Allergies   Allergen Reactions    Bupropion        Current Outpatient Medications on File Prior to Encounter   Medication Sig Dispense Refill    acetaminophen (TYLENOL) 500 MG tablet Take 500 mg by mouth every 6 hours as needed for Pain      atorvastatin (LIPITOR) 10 MG tablet Take 2 tablets by mouth daily 30 tablet 0    busPIRone (BUSPAR) 15 MG tablet Take 15 mg by mouth 2 times daily 60 tablet 0    hydrOXYzine (ATARAX) 50 MG tablet Take 1 tablet by mouth 3 times daily as needed for Anxiety 30 tablet 0    cloNIDine (CATAPRES) 0.1 MG tablet Take 1 tablet by mouth nightly 60 tablet 3    lithium (LITHOBID) 300 MG extended release tablet Take 1 tablet by mouth daily 60 tablet 3    omeprazole (PRILOSEC) 20 MG delayed release capsule Take 1 capsule by mouth daily 30 capsule 3    sertraline (ZOLOFT) 50 MG tablet Take 3 tablets by mouth daily 30 tablet 3    traZODone (DESYREL) 50 MG tablet Take 1 tablet by mouth nightly as needed for Sleep 30 tablet 0     No current facility-administered medications on file prior to encounter. Negative except for what is mentioned in the HPI. GENERAL PHYSICAL EXAM     Vitals: see nursing flow sheet for vital signs     GENERAL APPEARANCE:   Tess Yarbrough is 64 y.o.,  female,nourished, conscious, alert. Does not appear to be distress or pain at this time. SKIN:  Warm, dry, no cyanosis or jaundice. HEAD:  Normocephalic, atraumatic, no swelling or tenderness. EYES:  Pupils equal, reactive to light. EARS:  No discharge, no marked hearing loss. NOSE:  No rhinorrhea, epistaxis or septal deformity. THROAT:  Not congested. No ulceration bleeding or discharge. NECK:  No stiffness, trachea central.  No palpable masses or L.N.                 CHEST:  Symmetrical and equal on expansion. HEART:  RRR S1 > S2. No audible murmurs or gallops. LUNGS:  Equal on expansion, normal breath sounds. No adventitious sounds. ABDOMEN: Soft on palpation, No localized tenderness. No guarding or rigidity. No palpable hepatosplenomegaly. LYMPHATICS:  No palpable cervical lymphadenopathy.      LOCOMOTOR, BACK AND SPINE:  No tenderness

## 2021-04-19 ENCOUNTER — HOSPITAL ENCOUNTER (OUTPATIENT)
Dept: POSTOP/PACU | Age: 61
Discharge: HOME | End: 2021-04-19
Payer: COMMERCIAL

## 2021-04-19 ENCOUNTER — ANESTHESIA (OUTPATIENT)
Dept: POSTOP/PACU | Age: 61
End: 2021-04-19

## 2021-04-19 VITALS
HEART RATE: 71 BPM | OXYGEN SATURATION: 95 % | DIASTOLIC BLOOD PRESSURE: 71 MMHG | TEMPERATURE: 98.7 F | WEIGHT: 170 LBS | SYSTOLIC BLOOD PRESSURE: 118 MMHG | RESPIRATION RATE: 17 BRPM | BODY MASS INDEX: 27.32 KG/M2 | HEIGHT: 66 IN

## 2021-04-19 VITALS — OXYGEN SATURATION: 99 % | TEMPERATURE: 96.8 F

## 2021-04-19 PROCEDURE — 7100000001 HC PACU RECOVERY - ADDTL 15 MIN

## 2021-04-19 PROCEDURE — 2580000003 HC RX 258: Performed by: ANESTHESIOLOGY

## 2021-04-19 PROCEDURE — 6360000002 HC RX W HCPCS: Performed by: NURSE ANESTHETIST, CERTIFIED REGISTERED

## 2021-04-19 PROCEDURE — 3700000000 HC ANESTHESIA ATTENDED CARE

## 2021-04-19 PROCEDURE — 90870 ELECTROCONVULSIVE THERAPY: CPT

## 2021-04-19 PROCEDURE — 7100000000 HC PACU RECOVERY - FIRST 15 MIN

## 2021-04-19 PROCEDURE — 2500000003 HC RX 250 WO HCPCS: Performed by: NURSE ANESTHETIST, CERTIFIED REGISTERED

## 2021-04-19 PROCEDURE — 90870 ELECTROCONVULSIVE THERAPY: CPT | Performed by: PSYCHIATRY & NEUROLOGY

## 2021-04-19 RX ORDER — SUCCINYLCHOLINE/SOD CL,ISO/PF 200MG/10ML
SYRINGE (ML) INTRAVENOUS
Status: DISPENSED
Start: 2021-04-19 | End: 2021-04-19

## 2021-04-19 RX ORDER — LIDOCAINE HYDROCHLORIDE 10 MG/ML
1 INJECTION, SOLUTION EPIDURAL; INFILTRATION; INTRACAUDAL; PERINEURAL
Status: ACTIVE | OUTPATIENT
Start: 2021-04-19 | End: 2021-04-19

## 2021-04-19 RX ORDER — SUCCINYLCHOLINE CHLORIDE 20 MG/ML
INJECTION INTRAMUSCULAR; INTRAVENOUS PRN
Status: DISCONTINUED | OUTPATIENT
Start: 2021-04-19 | End: 2021-04-19 | Stop reason: SDUPTHER

## 2021-04-19 RX ORDER — SODIUM CHLORIDE 0.9 % (FLUSH) 0.9 %
5-40 SYRINGE (ML) INJECTION EVERY 12 HOURS SCHEDULED
Status: DISCONTINUED | OUTPATIENT
Start: 2021-04-19 | End: 2021-04-20 | Stop reason: HOSPADM

## 2021-04-19 RX ORDER — SODIUM CHLORIDE, SODIUM LACTATE, POTASSIUM CHLORIDE, CALCIUM CHLORIDE 600; 310; 30; 20 MG/100ML; MG/100ML; MG/100ML; MG/100ML
INJECTION, SOLUTION INTRAVENOUS CONTINUOUS
Status: DISCONTINUED | OUTPATIENT
Start: 2021-04-19 | End: 2021-04-20 | Stop reason: HOSPADM

## 2021-04-19 RX ADMIN — Medication 80 MG: at 07:43

## 2021-04-19 RX ADMIN — SUCCINYLCHOLINE CHLORIDE 80 MG: 20 INJECTION, SOLUTION INTRAMUSCULAR; INTRAVENOUS at 07:43

## 2021-04-19 RX ADMIN — SODIUM CHLORIDE, POTASSIUM CHLORIDE, SODIUM LACTATE AND CALCIUM CHLORIDE: 600; 310; 30; 20 INJECTION, SOLUTION INTRAVENOUS at 07:12

## 2021-04-19 ASSESSMENT — LIFESTYLE VARIABLES: SMOKING_STATUS: 1

## 2021-04-19 ASSESSMENT — PAIN - FUNCTIONAL ASSESSMENT: PAIN_FUNCTIONAL_ASSESSMENT: 0-10

## 2021-04-19 NOTE — ANESTHESIA PRE PROCEDURE
Department of Anesthesiology  Preprocedure Note       Name:  Lon Brasher   Age:  64 y.o.  :  1960                                          MRN:  [id number]         Date:  2021      Surgeon: * No surgeons listed *    Procedure: * No procedures listed *    Medications prior to admission:   Prior to Admission medications    Medication Sig Start Date End Date Taking?  Authorizing Provider   atorvastatin (LIPITOR) 10 MG tablet Take 2 tablets by mouth daily 21  Yes Leisa Palafox MD   busPIRone (BUSPAR) 15 MG tablet Take 15 mg by mouth 2 times daily 21  Yes Leisa Palafox MD   cloNIDine (CATAPRES) 0.1 MG tablet Take 1 tablet by mouth nightly 21  Yes Leisa Palafox MD   lithium (LITHOBID) 300 MG extended release tablet Take 1 tablet by mouth daily 21  Yes Leisa Palafox MD   omeprazole (PRILOSEC) 20 MG delayed release capsule Take 1 capsule by mouth daily 21  Yes Leisa Palafox MD   sertraline (ZOLOFT) 50 MG tablet Take 3 tablets by mouth daily 21  Yes Leisa Palafox MD   traZODone (DESYREL) 50 MG tablet Take 1 tablet by mouth nightly as needed for Sleep 21  Yes Leisa Palafox MD   acetaminophen (TYLENOL) 500 MG tablet Take 500 mg by mouth every 6 hours as needed for Pain    Historical Provider, MD       Current medications:    Current Outpatient Medications   Medication Sig Dispense Refill    atorvastatin (LIPITOR) 10 MG tablet Take 2 tablets by mouth daily 30 tablet 0    busPIRone (BUSPAR) 15 MG tablet Take 15 mg by mouth 2 times daily 60 tablet 0    cloNIDine (CATAPRES) 0.1 MG tablet Take 1 tablet by mouth nightly 60 tablet 3    lithium (LITHOBID) 300 MG extended release tablet Take 1 tablet by mouth daily 60 tablet 3    omeprazole (PRILOSEC) 20 MG delayed release capsule Take 1 capsule by mouth daily 30 capsule 3    sertraline (ZOLOFT) 50 MG tablet Take 3 tablets by mouth daily 30 tablet 3    traZODone (DESYREL) 50 MG tablet Take 1 tablet by mouth nightly as needed for Sleep 30 tablet 0    acetaminophen (TYLENOL) 500 MG tablet Take 500 mg by mouth every 6 hours as needed for Pain       Current Facility-Administered Medications   Medication Dose Route Frequency Provider Last Rate Last Admin    sodium chloride flush 0.9 % injection 5-40 mL  5-40 mL Intravenous 2 times per day Mando Ramirez MD        lactated ringers infusion   Intravenous Continuous Mando Ramirez  mL/hr at 04/19/21 0712 New Bag at 04/19/21 0712    lidocaine PF 1 % injection 1 mL  1 mL Intradermal Once PRN Mando Ramirez MD           Allergies:     Allergies   Allergen Reactions    Bupropion        Problem List:    Patient Active Problem List   Diagnosis Code    Major depression, single episode F32.9    Severe recurrent major depression with psychotic features ([address] F33.3       Past Medical History:        Diagnosis Date    Anxiety     Depression     Fracture of right wrist     GERD (gastroesophageal reflux disease)     Hyperlipidemia     Left wrist fracture     CHILDHOOD    KURT (obstructive sleep apnea)     Renal insufficiency     Creatinine 1.08 on 4-6-21    S/P ECT (electroconvulsive therapy)     Severe recurrent major depression with psychotic features ([facility] [address] 04/02/2021    Short-term memory loss     Suicidal ideation        Past Surgical History:        Procedure Laterality Date    COSMETIC SURGERY      face- CHILDHOOD    EYE SURGERY Bilateral     lasik    FRACTURE SURGERY Right     wrist    OTHER SURGICAL HISTORY      ECT tx's    WISDOM TOOTH EXTRACTION         Social History:    Social History     Tobacco Use    Smoking status: Current Some Day Smoker     Types: Cigarettes    Smokeless tobacco: Never Used    Tobacco comment: SMOKES 3 CIGARETTES OR SO A DAY, BUT NOT EVERY DAY   Substance Use Topics    Alcohol use: Yes     Comment: occas wine                                Ready to quit: Not Answered  Counseling given: Not Answered  Comment: SMOKES 3 CIGARETTES OR SO A DAY, BUT NOT EVERY DAY      Vital Signs (Current):   Vitals:    04/19/21 0646   BP: 109/60   Pulse: 62   Resp: 16   Temp: 97.1 °F (36.2 °C)   SpO2: 97%   Weight: 170 lb (77.1 kg)   Height: 5' 6\" (1.676 m)                                              BP Readings from Last 3 Encounters:   04/19/21 109/60   04/16/21 115/73   04/14/21 105/61       NPO Status: Time of last liquid consumption: 0515(SIPS WITH MEDS)                        Time of last solid consumption: 1700                        Date of last liquid consumption: 04/19/21                        Date of last solid food consumption: 04/18/21    BMI:   Wt Readings from Last 3 Encounters:   04/19/21 170 lb (77.1 kg)   04/16/21 170 lb (77.1 kg)   04/14/21 170 lb (77.1 kg)     Body mass index is 27.44 kg/m². CBC:   Lab Results   Component Value Date    WBC 5.9 04/05/2021    RBC 4.51 04/05/2021    HGB 13.9 04/05/2021    HCT 41.6 04/05/2021    MCV 92.3 04/05/2021    RDW 12.9 04/05/2021     04/05/2021       CMP:   Lab Results   Component Value Date     04/06/2021    K 4.6 04/06/2021     04/06/2021    CO2 27 04/06/2021    BUN 15 04/06/2021    CREATININE 1.08 04/06/2021    GFRAA >60 04/06/2021    LABGLOM 52 04/06/2021    GLUCOSE 110 04/06/2021    PROT 6.4 04/05/2021    CALCIUM 9.5 04/06/2021    BILITOT 0.29 04/05/2021    ALKPHOS 58 04/05/2021    AST 11 04/05/2021    ALT 15 04/05/2021       POC Tests: No results for input(s): POCGLU, POCNA, POCK, POCCL, POCBUN, POCHEMO, POCHCT in the last 72 hours.     Coags: No results found for: PROTIME, INR, APTT    HCG (If Applicable): No results found for: PREGTESTUR, PREGSERUM, HCG, HCGQUANT     ABGs: No results found for: PHART, PO2ART, ABA0ZEZ, OYO3EWW, BEART, [id number]     Type & Screen (If Applicable):  No results found for: LABABO, LABRH    Drug/Infectious Status (If Applicable):  No results found for: HIV, HEPCAB    COVID-19 Screening (If Applicable):   Lab Results   Component Value Date    COVID19 Not Detected 04/16/2021           Anesthesia Evaluation  Patient summary reviewed and Nursing notes reviewed no history of anesthetic complications:   Airway: Mallampati: III  TM distance: >3 FB   Neck ROM: full  Mouth opening: > = 3 FB Dental: normal exam         Pulmonary:normal exam  breath sounds clear to auscultation  (+) sleep apnea:  current smoker                           Cardiovascular:    (+) hyperlipidemia        Rhythm: regular  Rate: normal                    Neuro/Psych:   (+) psychiatric history:            GI/Hepatic/Renal:   (+) GERD:, renal disease: CRI,           Endo/Other:                     Abdominal:           Vascular:                                        Anesthesia Plan      MAC     ASA 3       Induction: intravenous. MIPS: Prophylactic antiemetics administered. Anesthetic plan and risks discussed with patient. Plan discussed with CRNA.                   Marielos Griggs MD   4/19/2021

## 2021-04-19 NOTE — PROCEDURES
Bilateral ECT Procedure Report  Varun Nose   4/19/2021  1960         Attending:Duc Wagner MD  Preprocedure diagnosis: Major depressive disorder, recurrent, severe with psychotic symptoms (F33.3)  Postprocedure diagnosis: SAME AS PRE-PROCEDURE DIAGNOSIS  Treatment Number: This is treatment # 6   Patient Status: Outpatient   Type of ECT: Bilateral Brief Pulse  Medications  Preprocedure: Tylenol 650mg  Anesthetic: Methohexital 80 mg  Paralytic: Succinylcholine 80 mg  Post procedure: none needed     Cuff placement: Left Lower Extremity    Thymatron Settings 1st Stimulus:     Pulse width: 1.0 ms   Frequency:  40 hz   % Power:   30 %   Static Impedance: 2040 ohms             Dynamic Impedance: 330 ohms             Motor Seizure Duration: 27 seconds  EEG Seizure Duration: 39 seconds                 The patient's ECT treatment was performed using Thymatron machine  . Timeout:  Time out was performed using two patient identifiers and confirmation of procedure. Patient Preparation: Preprocedure documentation, labs, H&P were all verified and reviewed. The patient was placed in supine position. EEG leads were placed for monitoring of seizure. [place] areas bilaterally cleaned and prepped. Pre-Tac solution was applied over stimulus electrode site. Thymapad's then applied to stimulus electrode site bilaterally with the center of the lead placed approximately 1 inch superior to the midpoint of line between canthus and the tragus bilaterally. The patient was pre-oxygenated. Procedure in detail: Medications were administered by anesthesia using intravenous access at the doses listed previously in this summary. Anesthetic administered and once confirmation the patient is anesthetized, the patient's blood pressure cuff on lower extremity was inflated to 100mmHg in excess of patient's blood pressure. At this time, the neuromuscular blockade was administered intravenously by anesthesia.   Upper extremity fasciculation were verified followed by lower extremity fasciculation. Once fasciculations terminated, confirmation of affective neuromuscular blockade demonstrated by absence of withdrawal reflex. Bite blocks were put in place. Static impedance was tested and within appropriate limits. Thymatron settings were confirmed with nursing staff. Staff was cleared from the patient and dose of ECT stimulus was delivered. Motor seizure activity  was observed at duration noted and terminated. EEG seizure activity was observed of duration noted that was confirmed via monitoring EEG and terminated with appropriate postictal suppression noted on EEG. Static impedance and dynamic impedance were documented. Tourniquet on  lower extremity was released and recovery procedures initiated. The patient was mask ventilated during the procedure with no significant drops in oxygenation saturation and tolerated the procedure well without any notable adverse effects. Condition: The patient was in stable condition with stable vital signs and able to follow commands when moved to recovery.

## 2021-04-19 NOTE — INTERVAL H&P NOTE
confirmed per myself and the patient.   Electronically signed by BLADIMIR Bryan CNP on 4/19/2021 at 6:34 AM

## 2021-04-19 NOTE — ANESTHESIA POSTPROCEDURE EVALUATION
Department of Anesthesiology  Postprocedure Note    Patient: Red Knapp  MRN: [id number]  YOB: 1960  Date of evaluation: 4/19/2021  Time:  10:06 AM     Procedure Summary     Date: 04/19/21 Room / Location: [facility] PACU    Anesthesia Start: 0740 Anesthesia Stop: 0372    Procedure: ECT W/ ANESTHESIA Diagnosis:     Scheduled Providers:  Responsible Provider: Kina Zavala MD    Anesthesia Type: MAC ASA Status: 3          Anesthesia Type: MAC    Tarik Phase I: Tarik Score: 10    Tarik Phase II:      Last vitals: Reviewed and per EMR flowsheets.        Anesthesia Post Evaluation    Comments: POST- ANESTHESIA EVALUATION       Pt Name: Red Knapp  MRN: [id number]  YOB: 1960  Date of evaluation: 4/19/2021  Time:  10:06 AM      /71   Pulse 71   Temp 98.7 °F (37.1 °C) (Infrared)   Resp 17   Ht 5' 6\" (1.676 m)   Wt 170 lb (77.1 kg)   SpO2 95%   BMI 27.44 kg/m²      Consciousness Level  Awake  Cardiopulmonary Status  Stable  Pain Adequately Treated YES  Nausea / Vomiting  NO  Adequate Hydration  YES  Anesthesia Related Complications NONE      Electronically signed by Kina Zavala MD on 4/19/2021 at 10:06 AM

## 2021-04-19 NOTE — PROGRESS NOTES
Pre ECT Assessment Note  Naty Bragg, [address] [facility]  Psychiatry  4/19/2021      [facility]  1960  [id number]      Subjective:     Patient is a 64 y.o.  female seen for an evaluation prior to today's electroconvulsive therapy treatment. Today is treatment number 6, utilizing bilateral.  This is course number 1. The patient has previously received no ECT. Swetha Miramontes is present today with her  for ECT. She reports that she does not believe the treatments are working. She reports no significant improvement in her mood or motivation. She was challenged on this idea since she had a significant improvement in her depression screening tool. Her  reports that he is seeing subtle improvements even though she is not experiencing it. She was encouraged by her family to continue with ECT through at least the index course of 12 treatments. We did discuss with her that if she stops treatment now we will not know if it will work for her. She reports some of her reluctance to continue is the long drive in the early morning hours. Her  reports that it is \"not a big deal\" and remains supportive and her continuing ECT. She reports this weekend that she did enjoy spending time with her grandkids. Her appetite is still down though slightly improved. She is denying any suicidal ideation, intent or plan. She is agreeable to \"hanging in there\" a little while longer with treatments.     Screening Tools:    MADRS Score 4/19/2021 = 42, repeat score 4/15/2021 = 25      Patient Active Problem List    Diagnosis Date Noted    Severe recurrent major depression with psychotic features ([facility] [address] 04/02/2021    Major depression, single episode 04/01/2021     Past Medical History:   Diagnosis Date    Anxiety     Depression     Fracture of right wrist     GERD (gastroesophageal reflux disease)     Hyperlipidemia     Left wrist fracture     CHILDHOOD    KURT (obstructive sleep apnea)     Renal insufficiency Creatinine 1.08 on 4-6-21    S/P ECT (electroconvulsive therapy)     Severe recurrent major depression with psychotic features ([facility] [address] 04/02/2021    Short-term memory loss     Suicidal ideation       Past Surgical History:   Procedure Laterality Date    COSMETIC SURGERY      face- CHILDHOOD    EYE SURGERY Bilateral     lasik    FRACTURE SURGERY Right     wrist    OTHER SURGICAL HISTORY      ECT tx's    WISDOM TOOTH EXTRACTION        Not in a hospital admission. Allergies   Allergen Reactions    Bupropion       Social History     Tobacco Use    Smoking status: Current Some Day Smoker     Types: Cigarettes    Smokeless tobacco: Never Used    Tobacco comment: SMOKES 3 CIGARETTES OR SO A DAY, BUT NOT EVERY DAY   Substance Use Topics    Alcohol use: Yes     Comment: occas wine      Family History   Problem Relation Age of Onset    Bipolar Disorder Daughter         X4 DAUGHTERS, SOME HAVE BIPOLAR/ANXIETY/ADHD    Anxiety Disorder Daughter     ADHD Daughter           Review Of Systems:       Psychiatric Review Of Systems:  Positive for depression and lack of motivation/energy. Sleep is appropriate. Appetite is decreased, though improving. Memory is poor, though patient reports this as baseline. Denies suicidal ideation, intent or plan and continues to verbally contract for safety.       Objective:       Mental Status Evaluation:  Appearance:  age appropriate and casually dressed   Behavior:   Calm, cooperative   Speech:  normal pitch, normal volume and soft   Mood:   Depressed, withdrawn   Affect:  mood-congruent   Thought Process:  goal directed   Thought Content:  suicidal ideation improved   Sensorium:  person, place, time/date and situation   Cognition:   Impaired   Insight:  good   Judgment:  good     Assessment:     Diagnosis: Major depressive disorder, recurrent, severe with psychotic symptoms    Plan:     Continue ECT twice weekly, will monitor for improvement in symptoms and continue tapering treatment as appropriate. Update consent, labs and H&P every 30 days while undergoing ECT treatment. Patient verbalizes understanding of risks/benefits/alternatives to ECT. Last informed consent signed on 4/7/2021.

## 2021-04-19 NOTE — PROGRESS NOTES
BP CUFF DEFLATED LEFT ANKLE    PULSE WIDTH- 1.0  FREQUENCY- 40  DURATION % POWER- 30  CURRENT- 0.9  EEG- 39  MOTOR- 27  STATIC- 2040  DYNAMIC- 330

## 2021-04-22 ENCOUNTER — ANESTHESIA EVENT (OUTPATIENT)
Dept: POSTOP/PACU | Age: 61
End: 2021-04-22

## 2021-04-23 ENCOUNTER — ANESTHESIA EVENT (OUTPATIENT)
Dept: POSTOP/PACU | Age: 61
End: 2021-04-23

## 2021-04-23 ENCOUNTER — ANESTHESIA (OUTPATIENT)
Dept: POSTOP/PACU | Age: 61
End: 2021-04-23

## 2021-04-23 ENCOUNTER — HOSPITAL ENCOUNTER (OUTPATIENT)
Dept: POSTOP/PACU | Age: 61
Discharge: HOME | End: 2021-04-23
Payer: COMMERCIAL

## 2021-04-23 VITALS
OXYGEN SATURATION: 93 % | RESPIRATION RATE: 20 BRPM | TEMPERATURE: 98.6 F | DIASTOLIC BLOOD PRESSURE: 71 MMHG | BODY MASS INDEX: 27.32 KG/M2 | WEIGHT: 170 LBS | SYSTOLIC BLOOD PRESSURE: 119 MMHG | HEART RATE: 67 BPM | HEIGHT: 66 IN

## 2021-04-23 VITALS — OXYGEN SATURATION: 99 %

## 2021-04-23 DIAGNOSIS — Z01.818 PREOPERATIVE CLEARANCE: ICD-10-CM

## 2021-04-23 DIAGNOSIS — F33.3 SEVERE RECURRENT MAJOR DEPRESSION WITH PSYCHOTIC FEATURES (HCC): Primary | ICD-10-CM

## 2021-04-23 DIAGNOSIS — Z01.818 PREOPERATIVE CLEARANCE: Primary | ICD-10-CM

## 2021-04-23 PROCEDURE — 2580000003 HC RX 258: Performed by: ANESTHESIOLOGY

## 2021-04-23 PROCEDURE — 3700000000 HC ANESTHESIA ATTENDED CARE

## 2021-04-23 PROCEDURE — 7100000001 HC PACU RECOVERY - ADDTL 15 MIN

## 2021-04-23 PROCEDURE — 7100000000 HC PACU RECOVERY - FIRST 15 MIN

## 2021-04-23 PROCEDURE — 90870 ELECTROCONVULSIVE THERAPY: CPT

## 2021-04-23 PROCEDURE — 2500000003 HC RX 250 WO HCPCS: Performed by: NURSE ANESTHETIST, CERTIFIED REGISTERED

## 2021-04-23 PROCEDURE — 87636 SARSCOV2 & INF A&B AMP PRB: CPT

## 2021-04-23 PROCEDURE — 90870 ELECTROCONVULSIVE THERAPY: CPT | Performed by: PSYCHIATRY & NEUROLOGY

## 2021-04-23 PROCEDURE — 6360000002 HC RX W HCPCS: Performed by: NURSE ANESTHETIST, CERTIFIED REGISTERED

## 2021-04-23 PROCEDURE — 3700000001 HC ADD 15 MINUTES (ANESTHESIA)

## 2021-04-23 RX ORDER — SUCCINYLCHOLINE CHLORIDE 20 MG/ML
INJECTION INTRAMUSCULAR; INTRAVENOUS PRN
Status: DISCONTINUED | OUTPATIENT
Start: 2021-04-23 | End: 2021-04-23 | Stop reason: SDUPTHER

## 2021-04-23 RX ORDER — SUCCINYLCHOLINE/SOD CL,ISO/PF 200MG/10ML
SYRINGE (ML) INTRAVENOUS
Status: DISPENSED
Start: 2021-04-23 | End: 2021-04-23

## 2021-04-23 RX ORDER — SODIUM CHLORIDE, SODIUM LACTATE, POTASSIUM CHLORIDE, CALCIUM CHLORIDE 600; 310; 30; 20 MG/100ML; MG/100ML; MG/100ML; MG/100ML
INJECTION, SOLUTION INTRAVENOUS CONTINUOUS
Status: DISCONTINUED | OUTPATIENT
Start: 2021-04-23 | End: 2021-04-24 | Stop reason: HOSPADM

## 2021-04-23 RX ORDER — LIDOCAINE HYDROCHLORIDE 10 MG/ML
1 INJECTION, SOLUTION EPIDURAL; INFILTRATION; INTRACAUDAL; PERINEURAL
Status: ACTIVE | OUTPATIENT
Start: 2021-04-23 | End: 2021-04-23

## 2021-04-23 RX ADMIN — Medication 80 MG: at 07:44

## 2021-04-23 RX ADMIN — SUCCINYLCHOLINE CHLORIDE 80 MG: 20 INJECTION, SOLUTION INTRAMUSCULAR; INTRAVENOUS at 07:44

## 2021-04-23 RX ADMIN — SODIUM CHLORIDE, POTASSIUM CHLORIDE, SODIUM LACTATE AND CALCIUM CHLORIDE: 600; 310; 30; 20 INJECTION, SOLUTION INTRAVENOUS at 07:11

## 2021-04-23 ASSESSMENT — LIFESTYLE VARIABLES: SMOKING_STATUS: 0

## 2021-04-23 ASSESSMENT — PAIN - FUNCTIONAL ASSESSMENT: PAIN_FUNCTIONAL_ASSESSMENT: 0-10

## 2021-04-23 ASSESSMENT — ENCOUNTER SYMPTOMS
STRIDOR: 0
SHORTNESS OF BREATH: 0

## 2021-04-23 NOTE — PROCEDURES
Bilateral ECT Procedure Report  Hali Adams   4/23/2021  1960         Attending:Trung Fleming MD  Preprocedure diagnosis: Major depressive disorder, recurrent, severe with psychotic symptoms (F33.3)  Postprocedure diagnosis: SAME AS PRE-PROCEDURE DIAGNOSIS  Treatment Number: This is treatment # 7  Patient Status: Outpatient   Type of ECT: Bilateral Brief Pulse  Medications  Preprocedure: Tylenol 650mg  Anesthetic: Methohexital 80 mg  Paralytic: Succinylcholine 80 mg  Post procedure: none needed     Cuff placement: Left Lower Extremity    Thymatron Settings 1st Stimulus:     Pulse width: 1.0 ms   Frequency:  40 hz   % Power:   30 %   Static Impedance: 1960 ohms             Dynamic Impedance: 270 ohms             Motor Seizure Duration: 26 seconds  EEG Seizure Duration: 38 seconds                 The patient's ECT treatment was performed using Thymatron machine  . Timeout:  Time out was performed using two patient identifiers and confirmation of procedure. Patient Preparation: Preprocedure documentation, labs, H&P were all verified and reviewed. The patient was placed in supine position. EEG leads were placed for monitoring of seizure. [place] areas bilaterally cleaned and prepped. Pre-Tac solution was applied over stimulus electrode site. Thymapad's then applied to stimulus electrode site bilaterally with the center of the lead placed approximately 1 inch superior to the midpoint of line between canthus and the tragus bilaterally. The patient was pre-oxygenated. Procedure in detail: Medications were administered by anesthesia using intravenous access at the doses listed previously in this summary. Anesthetic administered and once confirmation the patient is anesthetized, the patient's blood pressure cuff on lower extremity was inflated to 100mmHg in excess of patient's blood pressure. At this time, the neuromuscular blockade was administered intravenously by anesthesia.   Upper extremity fasciculation were verified followed by lower extremity fasciculation. Once fasciculations terminated, confirmation of affective neuromuscular blockade demonstrated by absence of withdrawal reflex. Bite blocks were put in place. Static impedance was tested and within appropriate limits. Thymatron settings were confirmed with nursing staff. Staff was cleared from the patient and dose of ECT stimulus was delivered. Motor seizure activity  was observed at duration noted and terminated. EEG seizure activity was observed of duration noted that was confirmed via monitoring EEG and terminated with appropriate postictal suppression noted on EEG. Static impedance and dynamic impedance were documented. Tourniquet on  lower extremity was released and recovery procedures initiated. The patient was mask ventilated during the procedure with no significant drops in oxygenation saturation and tolerated the procedure well without any notable adverse effects. Condition: The patient was in stable condition with stable vital signs and able to follow commands when moved to recovery.     Electronically signed by Rikki Almaraz MD on 4/23/2021 at 7:49 AM

## 2021-04-23 NOTE — ANESTHESIA PRE PROCEDURE
(DESYREL) 50 MG tablet Take 1 tablet by mouth nightly as needed for Sleep 30 tablet 0     Current Facility-Administered Medications   Medication Dose Route Frequency Provider Last Rate Last Admin    lactated ringers infusion   Intravenous Continuous Avtar Dupree MD        lidocaine PF 1 % injection 1 mL  1 mL Intradermal Once PRN Chyna Box MD           Allergies:     Allergies   Allergen Reactions    Bupropion        Problem List:    Patient Active Problem List   Diagnosis Code    Major depression, single episode F32.9    Severe recurrent major depression with psychotic features ([address] F33.3       Past Medical History:        Diagnosis Date    Anxiety     Depression     Fracture of right wrist     GERD (gastroesophageal reflux disease)     Hyperlipidemia     Left wrist fracture     CHILDHOOD    KURT (obstructive sleep apnea)     Renal insufficiency     Creatinine 1.08 on 4-6-21    S/P ECT (electroconvulsive therapy)     Severe recurrent major depression with psychotic features ([facility] [address] 04/02/2021    Short-term memory loss     Suicidal ideation        Past Surgical History:        Procedure Laterality Date    COSMETIC SURGERY      face- CHILDHOOD    EYE SURGERY Bilateral     lasik    FRACTURE SURGERY Right     wrist    OTHER SURGICAL HISTORY      ECT tx's    WISDOM TOOTH EXTRACTION         Social History:    Social History     Tobacco Use    Smoking status: Current Some Day Smoker     Types: Cigarettes    Smokeless tobacco: Never Used    Tobacco comment: SMOKES 3 CIGARETTES OR SO A DAY, BUT NOT EVERY DAY   Substance Use Topics    Alcohol use: Yes     Comment: occas wine                                Ready to quit: Not Answered  Counseling given: Not Answered  Comment: SMOKES 3 CIGARETTES OR SO A DAY, BUT NOT EVERY DAY      Vital Signs (Current):   Vitals:    04/23/21 0640   BP: 117/76   Pulse: 72   Resp: 16   Temp: 97.1 °F (36.2 °C)   TempSrc: Infrared   SpO2: 95%   Weight: 170 lb (77.1 kg) Height: 5' 6\" (1.676 m)                                              BP Readings from Last 3 Encounters:   04/23/21 117/76   04/19/21 118/71   04/16/21 115/73       NPO Status: Time of last liquid consumption: 2200                        Time of last solid consumption: 2200                        Date of last liquid consumption: 04/22/21                        Date of last solid food consumption: 04/22/21    BMI:   Wt Readings from Last 3 Encounters:   04/23/21 170 lb (77.1 kg)   04/19/21 170 lb (77.1 kg)   04/16/21 170 lb (77.1 kg)     Body mass index is 27.44 kg/m². CBC:   Lab Results   Component Value Date    WBC 5.9 04/05/2021    RBC 4.51 04/05/2021    HGB 13.9 04/05/2021    HCT 41.6 04/05/2021    MCV 92.3 04/05/2021    RDW 12.9 04/05/2021     04/05/2021     LR    CMP:   Lab Results   Component Value Date     04/06/2021    K 4.6 04/06/2021     04/06/2021    CO2 27 04/06/2021    BUN 15 04/06/2021    CREATININE 1.08 04/06/2021    GFRAA >60 04/06/2021    LABGLOM 52 04/06/2021    GLUCOSE 110 04/06/2021    PROT 6.4 04/05/2021    CALCIUM 9.5 04/06/2021    BILITOT 0.29 04/05/2021    ALKPHOS 58 04/05/2021    AST 11 04/05/2021    ALT 15 04/05/2021       POC Tests: No results for input(s): POCGLU, POCNA, POCK, POCCL, POCBUN, POCHEMO, POCHCT in the last 72 hours.     Coags: No results found for: PROTIME, INR, APTT    HCG (If Applicable): No results found for: PREGTESTUR, PREGSERUM, HCG, HCGQUANT     ABGs: No results found for: PHART, PO2ART, XQS8NHR, ZPH8ANU, BEART, [id number]     Type & Screen (If Applicable):  No results found for: LABABO, LABRH    Drug/Infectious Status (If Applicable):  No results found for: HIV, HEPCAB    COVID-19 Screening (If Applicable):   Lab Results   Component Value Date    COVID19 Not Detected 04/16/2021           Anesthesia Evaluation  Patient summary reviewed and Nursing notes reviewed no history of anesthetic complications:   Airway: Mallampati: II  TM distance: >3 FB
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
## 2021-04-23 NOTE — ANESTHESIA POSTPROCEDURE EVALUATION
POST- ANESTHESIA EVALUATION       Pt Name: Oswald Harris  MRN: [id number]  YOB: 1960  Date of evaluation: 4/23/2021  Time:  8:50 AM      /71   Pulse 67   Temp 98.6 °F (37 °C)   Resp 20   Ht 5' 6\" (1.676 m)   Wt 170 lb (77.1 kg)   SpO2 93%   BMI 27.44 kg/m²      Consciousness Level  Awake  Cardiopulmonary Status  Stable  Pain Adequately Treated YES  Nausea / Vomiting  NO  Adequate Hydration  YES  Anesthesia Related Complications NONE      Electronically signed by Cecil Lopez MD on 4/23/2021 at 8:50 AM       Department of Anesthesiology  Postprocedure Note    Patient: Oswald Harris  MRN: [id number]  YOB: 1960  Date of evaluation: 4/23/2021  Time:  8:50 AM     Procedure Summary     Date: 04/23/21 Room / Location: [address] [place] [address] PACU    Anesthesia Start: 4814 Anesthesia Stop: 9012    Procedure: ECT W/ ANESTHESIA Diagnosis:     Scheduled Providers:  Responsible Provider: Cecil Lopez MD    Anesthesia Type: general ASA Status: 2          Anesthesia Type: general    Tarik Phase I: Tarik Score: 10    Tarik Phase II:      Last vitals: Reviewed and per EMR flowsheets.        Anesthesia Post Evaluation

## 2021-04-23 NOTE — PROGRESS NOTES
BP CUFF DEFLATED LEFT ANKLE    PULSE WIDTH- 1.0  FREQUENCY- 40  DURATION % POWER- 30  CURRENT- 0.9  EEG- 38  MOTOR- 26  STATIC- 1960  DYNAMIC- 270    BILATERAL ECT PERFORMED
SURGERY Bilateral     lasik    FRACTURE SURGERY Right     wrist    OTHER SURGICAL HISTORY      ECT tx's    WISDOM TOOTH EXTRACTION        Not in a hospital admission. Allergies   Allergen Reactions    Bupropion       Social History     Tobacco Use    Smoking status: Current Some Day Smoker     Types: Cigarettes    Smokeless tobacco: Never Used    Tobacco comment: SMOKES 3 CIGARETTES OR SO A DAY, BUT NOT EVERY DAY   Substance Use Topics    Alcohol use: Yes     Comment: occas wine      Family History   Problem Relation Age of Onset    Bipolar Disorder Daughter         X4 DAUGHTERS, SOME HAVE BIPOLAR/ANXIETY/ADHD    Anxiety Disorder Daughter     ADHD Daughter           Review Of Systems:       Psychiatric Review Of Systems:  Positive for improving depression and motivation.  reports noticeable improvement more than patient feels it. Appetite improving. Sleep good. Denies suicidal ideation or thinking about \"God taking her\". Objective:       Mental Status Evaluation:  Appearance:  age appropriate and casually dressed   Behavior:   Calm, cooperative   Speech:  normal pitch, normal volume and soft   Mood:   Depressed, less withdrawn   Affect:  mood-congruent   Thought Process:  goal directed   Thought Content:  suicidal ideation improved   Sensorium:  person, place, time/date and situation   Cognition:   Impaired   Insight:  good   Judgment:  good     Assessment:     Diagnosis: Major depressive disorder, recurrent, severe with psychotic symptoms    Plan:     Continue ECT twice weekly, will monitor for improvement in symptoms and continue tapering treatment as appropriate. We will repeat depression scale next Friday. Update consent, labs and H&P every 30 days while undergoing ECT treatment. Patient verbalizes understanding of risks/benefits/alternatives to ECT. Last informed consent signed on 4/23/2021.

## 2021-04-23 NOTE — INTERVAL H&P NOTE
Update History & Physical     The patient's History and Physical of 4-12-21 was reviewed with the patient. I personally examined the patient, I concur with above findings, there was no change EXCEPT last ECT tx was 4-19-21, patient tolerated well/no complications. Patient rating mood today a 5 on 0-10 scale, 10 being the best/0 being the worst. Denies any thoughts/plans of harming themselves or others. Denies hallucinations. She is taking medications as rx'd. States sleep and appetite both have not been \"great\". States she has been using CPAP nightly now. + short term memory loss con't. NPO status: Patient states they have been NPO since before midnight. Medications taken TODAY (w/sip of water): NONE. Anticoagulation status: Patient denies taking any anti-coagulants, including aspirin currently. Personal or family hx of complications w/anesthesia: DENIES. Nicotine status: CURRENT SMOKER- LAST SMOKED YESTERDAY. Denies personal hx of MRSA. Denies personal hx of blood clots. Denies CP, palpitations, dizziness, SOB, URI s/s, GI issues, fever/chills. Review current vital signs per RN flow sheet. (Notation: Medications listed are not currently reconciled at the signing of this H&P note, to be reconciled in pre-op per RN)    Most recent lab work reviewed:  Lab Results   Component Value Date     04/06/2021    K 4.6 04/06/2021     04/06/2021    CO2 27 04/06/2021    BUN 15 04/06/2021    CREATININE 1.08 (H) 04/06/2021    GLUCOSE 110 (H) 04/06/2021    CALCIUM 9.5 04/06/2021    PROT 6.4 04/05/2021    LABALBU 4.3 04/05/2021    BILITOT 0.29 (L) 04/05/2021    ALKPHOS 58 04/05/2021    AST 11 04/05/2021    ALT 15 04/05/2021    LABGLOM 52 (L) 04/06/2021    GFRAA >60 04/06/2021       Lab Results   Component Value Date    WBC 5.9 04/05/2021    HGB 13.9 04/05/2021    HCT 41.6 04/05/2021    MCV 92.3 04/05/2021     04/05/2021       Surgical site was confirmed per myself and the patient.   Electronically signed by BLADIMIR Veras CNP on 4/23/2021 at 6:29 AM

## 2021-04-26 ENCOUNTER — HOSPITAL ENCOUNTER (OUTPATIENT)
Dept: POSTOP/PACU | Age: 61
Discharge: HOME | End: 2021-04-26
Payer: COMMERCIAL

## 2021-04-26 ENCOUNTER — ANESTHESIA (OUTPATIENT)
Dept: POSTOP/PACU | Age: 61
End: 2021-04-26

## 2021-04-26 ENCOUNTER — TELEPHONE (OUTPATIENT)
Dept: PSYCHIATRY | Age: 61
End: 2021-04-26

## 2021-04-26 VITALS
DIASTOLIC BLOOD PRESSURE: 75 MMHG | RESPIRATION RATE: 16 BRPM | HEART RATE: 63 BPM | TEMPERATURE: 98.9 F | SYSTOLIC BLOOD PRESSURE: 141 MMHG | HEIGHT: 66 IN | WEIGHT: 170 LBS | OXYGEN SATURATION: 96 % | BODY MASS INDEX: 27.32 KG/M2

## 2021-04-26 VITALS — OXYGEN SATURATION: 100 %

## 2021-04-26 PROCEDURE — 2500000003 HC RX 250 WO HCPCS

## 2021-04-26 PROCEDURE — 3700000000 HC ANESTHESIA ATTENDED CARE

## 2021-04-26 PROCEDURE — 90870 ELECTROCONVULSIVE THERAPY: CPT | Performed by: PSYCHIATRY & NEUROLOGY

## 2021-04-26 PROCEDURE — 7100000001 HC PACU RECOVERY - ADDTL 15 MIN

## 2021-04-26 PROCEDURE — 90870 ELECTROCONVULSIVE THERAPY: CPT

## 2021-04-26 PROCEDURE — 7100000000 HC PACU RECOVERY - FIRST 15 MIN

## 2021-04-26 PROCEDURE — 6360000002 HC RX W HCPCS

## 2021-04-26 PROCEDURE — 2580000003 HC RX 258: Performed by: ANESTHESIOLOGY

## 2021-04-26 RX ORDER — LABETALOL HYDROCHLORIDE 5 MG/ML
5 INJECTION, SOLUTION INTRAVENOUS EVERY 10 MIN PRN
Status: DISCONTINUED | OUTPATIENT
Start: 2021-04-26 | End: 2021-04-27 | Stop reason: HOSPADM

## 2021-04-26 RX ORDER — LIDOCAINE HYDROCHLORIDE 10 MG/ML
1 INJECTION, SOLUTION EPIDURAL; INFILTRATION; INTRACAUDAL; PERINEURAL
Status: ACTIVE | OUTPATIENT
Start: 2021-04-26 | End: 2021-04-26

## 2021-04-26 RX ORDER — ONDANSETRON 2 MG/ML
4 INJECTION INTRAMUSCULAR; INTRAVENOUS
Status: ACTIVE | OUTPATIENT
Start: 2021-04-26 | End: 2021-04-26

## 2021-04-26 RX ORDER — SODIUM CHLORIDE 9 MG/ML
25 INJECTION, SOLUTION INTRAVENOUS PRN
Status: DISCONTINUED | OUTPATIENT
Start: 2021-04-26 | End: 2021-04-27 | Stop reason: HOSPADM

## 2021-04-26 RX ORDER — MEPERIDINE HYDROCHLORIDE 25 MG/ML
12.5 INJECTION INTRAMUSCULAR; INTRAVENOUS; SUBCUTANEOUS EVERY 5 MIN PRN
Status: DISCONTINUED | OUTPATIENT
Start: 2021-04-26 | End: 2021-04-27 | Stop reason: HOSPADM

## 2021-04-26 RX ORDER — SODIUM CHLORIDE 0.9 % (FLUSH) 0.9 %
10 SYRINGE (ML) INJECTION PRN
Status: DISCONTINUED | OUTPATIENT
Start: 2021-04-26 | End: 2021-04-27 | Stop reason: HOSPADM

## 2021-04-26 RX ORDER — MORPHINE SULFATE 2 MG/ML
2 INJECTION, SOLUTION INTRAMUSCULAR; INTRAVENOUS EVERY 5 MIN PRN
Status: DISCONTINUED | OUTPATIENT
Start: 2021-04-26 | End: 2021-04-27 | Stop reason: HOSPADM

## 2021-04-26 RX ORDER — SUCCINYLCHOLINE/SOD CL,ISO/PF 100 MG/5ML
SYRINGE (ML) INTRAVENOUS PRN
Status: DISCONTINUED | OUTPATIENT
Start: 2021-04-26 | End: 2021-04-26 | Stop reason: SDUPTHER

## 2021-04-26 RX ORDER — DIPHENHYDRAMINE HYDROCHLORIDE 50 MG/ML
12.5 INJECTION INTRAMUSCULAR; INTRAVENOUS
Status: ACTIVE | OUTPATIENT
Start: 2021-04-26 | End: 2021-04-26

## 2021-04-26 RX ORDER — SUCCINYLCHOLINE/SOD CL,ISO/PF 200MG/10ML
SYRINGE (ML) INTRAVENOUS
Status: COMPLETED
Start: 2021-04-26 | End: 2021-04-26

## 2021-04-26 RX ORDER — SODIUM CHLORIDE, SODIUM LACTATE, POTASSIUM CHLORIDE, CALCIUM CHLORIDE 600; 310; 30; 20 MG/100ML; MG/100ML; MG/100ML; MG/100ML
INJECTION, SOLUTION INTRAVENOUS CONTINUOUS
Status: DISCONTINUED | OUTPATIENT
Start: 2021-04-26 | End: 2021-04-27 | Stop reason: HOSPADM

## 2021-04-26 RX ADMIN — SODIUM CHLORIDE, POTASSIUM CHLORIDE, SODIUM LACTATE AND CALCIUM CHLORIDE: 600; 310; 30; 20 INJECTION, SOLUTION INTRAVENOUS at 07:01

## 2021-04-26 RX ADMIN — Medication 80 MG: at 07:38

## 2021-04-26 ASSESSMENT — ENCOUNTER SYMPTOMS
SHORTNESS OF BREATH: 0
GASTROINTESTINAL NEGATIVE: 1
SORE THROAT: 0
COUGH: 0
RHINORRHEA: 0
STRIDOR: 0
SHORTNESS OF BREATH: 0
TROUBLE SWALLOWING: 0
WHEEZING: 0

## 2021-04-26 ASSESSMENT — PAIN - FUNCTIONAL ASSESSMENT: PAIN_FUNCTIONAL_ASSESSMENT: 0-10

## 2021-04-26 ASSESSMENT — PAIN SCALES - GENERAL: PAINLEVEL_OUTOF10: 0

## 2021-04-26 ASSESSMENT — LIFESTYLE VARIABLES: SMOKING_STATUS: 0

## 2021-04-26 NOTE — ANESTHESIA POSTPROCEDURE EVALUATION
POST- ANESTHESIA EVALUATION       Pt Name: Kimberly Turner  MRN: [id number]  YOB: 1960  Date of evaluation: 4/26/2021  Time:  9:26 AM      BP (!) 141/75   Pulse 63   Temp 98.9 °F (37.2 °C) (Infrared)   Resp 16   Ht 5' 6\" (1.676 m)   Wt 170 lb (77.1 kg)   SpO2 96%   BMI 27.44 kg/m²      Consciousness Level  Awake  Cardiopulmonary Status  Stable  Pain Adequately Treated YES  Nausea / Vomiting  NO  Adequate Hydration  YES  Anesthesia Related Complications NONE      Electronically signed by José Bonner MD on 4/26/2021 at 9:26 AM       Department of Anesthesiology  Postprocedure Note    Patient: Kimberly Turner  MRN: [id number]  YOB: 1960  Date of evaluation: 4/26/2021  Time:  9:26 AM     Procedure Summary     Date: 04/26/21 Room / Location: [address] [place] [address] PACU    Anesthesia Start: 0673 Anesthesia Stop: 7025    Procedure: ECT W/ ANESTHESIA Diagnosis:     Scheduled Providers:  Responsible Provider: José Bonner MD    Anesthesia Type: general ASA Status: 2          Anesthesia Type: general    Tarik Phase I: Tarik Score: 9    Tarik Phase II:      Last vitals: Reviewed and per EMR flowsheets.        Anesthesia Post Evaluation
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
## 2021-04-26 NOTE — PROCEDURES
Bilateral ECT Procedure Report  Lon Brasher   4/26/2021  1960         Attending:Trung Hanley MD  Preprocedure diagnosis: Major depressive disorder, recurrent, severe with psychotic symptoms (F33.3)  Postprocedure diagnosis: SAME AS PRE-PROCEDURE DIAGNOSIS  Treatment Number: This is treatment # 8  Patient Status: Outpatient   Type of ECT: Bilateral Brief Pulse  Medications  Preprocedure: Tylenol 650mg  Anesthetic: Methohexital 80 mg  Paralytic: Succinylcholine 80 mg  Post procedure: none needed     Cuff placement: Left Lower Extremity    Thymatron Settings 1st Stimulus:     Pulse width: 1.0 ms   Frequency:  40 hz   % Power:   30 %   Static Impedance: 1610ohms             Dynamic Impedance: 250ohms             Motor Seizure Duration: 0 seconds  EEG Seizure Duration: 27 seconds                 The patient's ECT treatment was performed using Thymatron machine  . Timeout:  Time out was performed using two patient identifiers and confirmation of procedure. Patient Preparation: Preprocedure documentation, labs, H&P were all verified and reviewed. The patient was placed in supine position. EEG leads were placed for monitoring of seizure. Rastafari areas bilaterally cleaned and prepped. Pre-Tac solution was applied over stimulus electrode site. Thymapad's then applied to stimulus electrode site bilaterally with the center of the lead placed approximately 1 inch superior to the midpoint of line between canthus and the tragus bilaterally. The patient was pre-oxygenated. Procedure in detail: Medications were administered by anesthesia using intravenous access at the doses listed previously in this summary. Anesthetic administered and once confirmation the patient is anesthetized, the patient's blood pressure cuff on lower extremity was inflated to 100mmHg in excess of patient's blood pressure. At this time, the neuromuscular blockade was administered intravenously by anesthesia.   Upper extremity fasciculation were verified followed by lower extremity fasciculation. Once fasciculations terminated, confirmation of affective neuromuscular blockade demonstrated by absence of withdrawal reflex. Bite blocks were put in place. Static impedance was tested and within appropriate limits. Thymatron settings were confirmed with nursing staff. Staff was cleared from the patient and dose of ECT stimulus was delivered. Motor seizure activity  was observed at duration noted and terminated. EEG seizure activity was observed of duration noted that was confirmed via monitoring EEG and terminated with appropriate postictal suppression noted on EEG. Static impedance and dynamic impedance were documented. Tourniquet on  lower extremity was released and recovery procedures initiated. The patient was mask ventilated during the procedure with no significant drops in oxygenation saturation and tolerated the procedure well without any notable adverse effects. Condition: The patient was in stable condition with stable vital signs and able to follow commands when moved to recovery.     Electronically signed by Mena Smalls MD on 4/26/2021 at 7:44 AM

## 2021-04-26 NOTE — ANESTHESIA PRE PROCEDURE
Department of Anesthesiology  Preprocedure Note       Name:  Red Knapp   Age:  64 y.o.  :  1960                                          MRN:  [id number]         Date:  2021      Surgeon: Li Webber  Procedure: ECT  Medications prior to admission:   Prior to Admission medications    Medication Sig Start Date End Date Taking?  Authorizing Provider   acetaminophen (TYLENOL) 500 MG tablet Take 500 mg by mouth every 6 hours as needed for Pain   Yes Historical Provider, MD   atorvastatin (LIPITOR) 10 MG tablet Take 2 tablets by mouth daily 21  Yes Adam Varner MD   busPIRone (BUSPAR) 15 MG tablet Take 15 mg by mouth 2 times daily 21  Yes Adam Varner MD   cloNIDine (CATAPRES) 0.1 MG tablet Take 1 tablet by mouth nightly 21  Yes Adam Varner MD   lithium (LITHOBID) 300 MG extended release tablet Take 1 tablet by mouth daily 21  Yes Adam Varner MD   omeprazole (PRILOSEC) 20 MG delayed release capsule Take 1 capsule by mouth daily 21  Yes Adam Varner MD   sertraline (ZOLOFT) 50 MG tablet Take 3 tablets by mouth daily 21  Yes Adam Varner MD   traZODone (DESYREL) 50 MG tablet Take 1 tablet by mouth nightly as needed for Sleep 21  Yes Adam Varner MD       Current medications:    Current Outpatient Medications   Medication Sig Dispense Refill    acetaminophen (TYLENOL) 500 MG tablet Take 500 mg by mouth every 6 hours as needed for Pain      atorvastatin (LIPITOR) 10 MG tablet Take 2 tablets by mouth daily 30 tablet 0    busPIRone (BUSPAR) 15 MG tablet Take 15 mg by mouth 2 times daily 60 tablet 0    cloNIDine (CATAPRES) 0.1 MG tablet Take 1 tablet by mouth nightly 60 tablet 3    lithium (LITHOBID) 300 MG extended release tablet Take 1 tablet by mouth daily 60 tablet 3    omeprazole (PRILOSEC) 20 MG delayed release capsule Take 1 capsule by mouth daily 30 capsule 3    sertraline (ZOLOFT) 50 MG tablet Take 3 tablets by mouth daily 30 tablet 3    traZODone (DESYREL) 50 MG tablet Take 1 tablet by mouth nightly as needed for Sleep 30 tablet 0     Current Facility-Administered Medications   Medication Dose Route Frequency Provider Last Rate Last Admin    lactated ringers infusion   Intravenous Continuous Salisburymoraima Mcdowell  mL/hr at 04/26/21 0701 New Bag at 04/26/21 0701    sodium chloride flush 0.9 % injection 10 mL  10 mL Intravenous PRN Candis Mcdowell MD        0.9 % sodium chloride infusion  25 mL Intravenous PRN Candis Mcdowell MD        lidocaine PF 1 % injection 1 mL  1 mL Intradermal Once PRN Milka Fontenot MD           Allergies:     Allergies   Allergen Reactions    Bupropion        Problem List:    Patient Active Problem List   Diagnosis Code    Major depression, single episode F32.9    Severe recurrent major depression with psychotic features ([facility] F33.3       Past Medical History:        Diagnosis Date    Anxiety     Depression     Fracture of right wrist     GERD (gastroesophageal reflux disease)     Hyperlipidemia     Left wrist fracture     CHILDHOOD    KURT (obstructive sleep apnea)     Renal insufficiency     Creatinine 1.08 on 4-6-21    S/P ECT (electroconvulsive therapy)     Severe recurrent major depression with psychotic features ([facility] 04/02/2021    Short-term memory loss     Suicidal ideation        Past Surgical History:        Procedure Laterality Date    COSMETIC SURGERY      face- CHILDHOOD    EYE SURGERY Bilateral     lasik    FRACTURE SURGERY Right     wrist    OTHER SURGICAL HISTORY      ECT tx's    WISDOM TOOTH EXTRACTION         Social History:    Social History     Tobacco Use    Smoking status: Current Some Day Smoker     Types: Cigarettes    Smokeless tobacco: Never Used    Tobacco comment: SMOKES 3 CIGARETTES OR SO A DAY, BUT NOT EVERY DAY   Substance Use Topics    Alcohol use: Yes     Comment: occas wine                                Ready to quit: Not Answered  Counseling given: Not Answered Results   Component Value Date    COVID19 Not Detected 04/23/2021           Anesthesia Evaluation  Patient summary reviewed and Nursing notes reviewed no history of anesthetic complications:   Airway: Mallampati: II  TM distance: >3 FB   Neck ROM: full  Mouth opening: > = 3 FB Dental: normal exam         Pulmonary:normal exam  breath sounds clear to auscultation  (+) sleep apnea:      (-) pneumonia, COPD, asthma, shortness of breath, recent URI, rhonchi, wheezes, rales, stridor and not a current smoker          Patient did not smoke on day of surgery. Cardiovascular:Negative CV ROS  Exercise tolerance: good (>4 METS),       (-) pacemaker, hypertension, valvular problems/murmurs, past MI, CAD, CABG/stent, dysrhythmias,  angina,  CHF, orthopnea, PND,  CABEZAS, murmur, weak pulses,  friction rub, systolic click, carotid bruit,  JVD and peripheral edema    ECG reviewed  Rhythm: regular  Rate: normal           Beta Blocker:  Not on Beta Blocker         Neuro/Psych:   (+) psychiatric history: stable with treatmentdepression/anxiety    (-) seizures, neuromuscular disease, TIA, CVA and headaches           GI/Hepatic/Renal:   (+) GERD: no interval change,      (-) hiatal hernia, PUD, hepatitis, liver disease, no renal disease, bowel prep and no morbid obesity       Endo/Other: Negative Endo/Other ROS   (+) no malignancy/cancer. (-) diabetes mellitus, hypothyroidism, hyperthyroidism, blood dyscrasia, arthritis, no electrolyte abnormalities, no malignancy/cancer               Abdominal:           Vascular: negative vascular ROS. - PVD, DVT and PE. Anesthesia Plan      general     ASA 2       Induction: intravenous. MIPS: Postoperative opioids intended and Prophylactic antiemetics administered. Anesthetic plan and risks discussed with patient. Plan discussed with CRNA.                   Pedro Yung MD   4/26/2021

## 2021-04-26 NOTE — PROGRESS NOTES
insufficiency     Creatinine 1.08 on 4-6-21    S/P ECT (electroconvulsive therapy)     Severe recurrent major depression with psychotic features ([facility] [facility] 04/02/2021    Short-term memory loss     Suicidal ideation       Past Surgical History:   Procedure Laterality Date    COSMETIC SURGERY      face- CHILDHOOD    EYE SURGERY Bilateral     lasik    FRACTURE SURGERY Right     wrist    OTHER SURGICAL HISTORY      ECT tx's    WISDOM TOOTH EXTRACTION        Not in a hospital admission. Allergies   Allergen Reactions    Bupropion       Social History     Tobacco Use    Smoking status: Current Some Day Smoker     Types: Cigarettes    Smokeless tobacco: Never Used    Tobacco comment: SMOKES 3 CIGARETTES OR SO A DAY, BUT NOT EVERY DAY   Substance Use Topics    Alcohol use: Yes     Comment: occas wine      Family History   Problem Relation Age of Onset    Bipolar Disorder Daughter         X4 DAUGHTERS, SOME HAVE BIPOLAR/ANXIETY/ADHD    Anxiety Disorder Daughter     ADHD Daughter           Review Of Systems:       Psychiatric Review Of Systems:  Positive for improving depression. Continues to struggle with motivation.  reports noticeable improvement more than patient feels it. Appetite fair. Sleep good. Denies suicidal ideation or thinking about \"God taking her\".       Objective:       Mental Status Evaluation:  Appearance:  age appropriate and casually dressed   Behavior:   Calm, cooperative   Speech:  normal pitch, normal volume and soft   Mood:   Depressed, less withdrawn   Affect:  mood-congruent   Thought Process:  goal directed   Thought Content:  suicidal ideation improved   Sensorium:  person, place, time/date and situation   Cognition:   Impaired   Insight:  good   Judgment:  good     Assessment:     Diagnosis: Major depressive disorder, recurrent, severe with psychotic symptoms    Plan:     Continue ECT twice weekly, will monitor for improvement in symptoms and continue tapering treatment as appropriate. We will repeat depression scale on Friday. Update consent, labs and H&P every 30 days while undergoing ECT treatment. Patient verbalizes understanding of risks/benefits/alternatives to ECT. Last informed consent signed on 4/23/2021.

## 2021-04-26 NOTE — H&P
HISTORY and [facility]       NAME:  Tim Woodson  MRN: [id number]   YOB: 1960   Date: 4/26/2021   Age: 64 y.o. Gender: female     COMPLAINT AND PRESENT HISTORY:   Tim Woodson is 64 y.o.,  female, undergoing ECT TX for Severe recurrent major depression with psychotic features per Dr. Jarred Valdez. Last ECT tx was 6-87-16, denies complications, tolerated without issue- states she is fatigued. She is receiving ECT tx's twice/week patient believes. Patient rating mood today a 5 on 0-10 scale, 10 being the best/0 being the worst. Denies any thoughts/plans of harming themselves or others. Denies hallucinations. States she is sleeping and eating \"OK\". She is taking medications as prescribed. She is maintained on Buspar, clonidine, lithium, Zoloft, and trazodone. She does admit to short term memory loss, denies headaches. She is taking Tylenol prior to ECT tx's. HPI reviewed per writer by Dr. Alicia Griggs (d/c summary as of 4-9-21)- RECENT ADMISSION TO [facility] 4-2-21--4-9-21:  History of Present Illnes (present tense wording is of findings from admission exam and are not necessarily indicative of current findings):   Malissa Sanchez a 64 y. o. female with significant past medical history of GERD and hyperlipidemia who was brought to the ER but her  and adult daughter for suicidal thoughts. Patient states she had a plan to stab herself with a knife but told her daughter. Patient reports that there has been a significant amount of stressors that have led to this including filling bankruptcy and \"having to be responsible. \"   Patient reports that she has been feeling down and depressed all day, everyday for a period of time longer than 2 weeks. She reports poor sleep despite the use of Ambien.  She states she sleeps with a CPAP machine.  She endorses significant anhedonia, decreased concentration, decreased energy, and feelings of hopeless and helplessness.  She reports that her appetite is poor. She endorses feelings of guilt that she lacks so much energy and states that she just feels \"weak. \"  Patient has chronic passive suicidal thoughts. She states, \"I'm angry with God because he won't just let me die naturally. \" She states that she wants to die but she is \"too scared\" to do anything. Rohini Rollins denies a time in her past where she has had grandiose thoughts of herself, gone 5 days or more without sleep, and made impulsive decisions.  Patient endorses auditory hallucinations of doors opening and closing and of music but states this only happens when she is feeling down and depressed. She also reports she sometimes sees shadows when she is down and depressed.  She endorses anxiety about anything and everything.  She reports she is often on edge and restless. She endorses muscle tension from being keyed up all the time. She reports that she believes her anxiety interferes with her sleep and concentration.  Patient does believe that her chronic thoughts of suicide are persistent and obtrusive.  She reports that she does have a history of doing repetitive things or else something bad will happen. She reports in the past when she worked, she would drive home from work and then would have to drive back to work and back home to make sure that nothing bad had happened or that she had caused any accidents. She reports that she repetitively washes the sinks at home but reports she hasn't lately because her energy has been so low.  She states that she \"always\" has an impending sense or doom or feels that something bad is going to happen.  She endorses panic attacks that come out of nowehere. She reports an impending sense of doom, heart palpitations and she states, \"I either become really hot or really cold. \"      Patient states that \"growing up wasn't great. \" She does not want to elaborate on this. She becomes tearful when this writer asks if she suffered any abuse.  She reports that her parents status: CURRENT SMOKER- LAST SMOKED YESTERDAY AFTERNOON.   PAST MEDICAL HISTORY     Past Medical History:   Diagnosis Date    Anxiety     Depression     Fracture of right wrist     GERD (gastroesophageal reflux disease)     Hyperlipidemia     Left wrist fracture     CHILDHOOD    KURT (obstructive sleep apnea)     Renal insufficiency     Creatinine 1.08 on 4-6-21    S/P ECT (electroconvulsive therapy)     [facility] with [facility] 04[facility]    Short-term memory loss     Suicidal ideation        SURGICAL HISTORY       Past Surgical History:   Procedure Laterality Date    COSMETIC SURGERY      face- CHILDHOOD    EYE SURGERY Bilateral     lasik    FRACTURE SURGERY Right     wrist    OTHER SURGICAL HISTORY      ECT tx's    WISDOM TOOTH EXTRACTION         SOCIAL HISTORY       Social History     Socioeconomic History    Marital status:      Spouse name: None    Number of children: None    Years of education: None    Highest education level: None   Occupational History    None   Social Needs    Financial resource strain: None    Food insecurity     Worry: None     Inability: None    Transportation needs     Medical: None     Non-medical: None   Tobacco Use    Smoking status: Current Some Day Smoker     Types: Cigarettes    Smokeless tobacco: Never Used    Tobacco comment: SMOKES 3 CIGARETTES OR SO A DAY, BUT NOT EVERY DAY   Substance and Sexual Activity    Alcohol use: Yes     Comment: occas wine    Drug use: Never    Sexual activity: Yes     Partners: Male   Lifestyle    Physical activity     Days per week: None     Minutes per session: None    Stress: None   Relationships    Social connections     Talks on phone: None     Gets together: None     Attends Hoahaoism service: None     Active member of club or organization: None     Attends meetings of clubs or organizations: None     Relationship status: None    Intimate partner violence     Fear of current or ex partner: None     Emotionally abused: None     Physically abused: None     Forced sexual activity: None   Other Topics Concern    None   Social History Narrative    None       REVIEW OF SYSTEMS      Allergies   Allergen Reactions    Bupropion        Current Outpatient Medications on File Prior to Encounter   Medication Sig Dispense Refill    acetaminophen (TYLENOL) 500 MG tablet Take 500 mg by mouth every 6 hours as needed for Pain      atorvastatin (LIPITOR) 10 MG tablet Take 2 tablets by mouth daily 30 tablet 0    busPIRone (BUSPAR) 15 MG tablet Take 15 mg by mouth 2 times daily 60 tablet 0    cloNIDine (CATAPRES) 0.1 MG tablet Take 1 tablet by mouth nightly 60 tablet 3    lithium (LITHOBID) 300 MG extended release tablet Take 1 tablet by mouth daily 60 tablet 3    omeprazole (PRILOSEC) 20 MG delayed release capsule Take 1 capsule by mouth daily 30 capsule 3    sertraline (ZOLOFT) 50 MG tablet Take 3 tablets by mouth daily 30 tablet 3    traZODone (DESYREL) 50 MG tablet Take 1 tablet by mouth nightly as needed for Sleep 30 tablet 0     No current facility-administered medications on file prior to encounter. (Notation: Medications listed above are not currently reconciled at the signing of this H&P note, to be reconciled in pre-op per RN)    Review of Systems   Constitutional: Negative for chills and fever. HENT: Negative for congestion, ear pain, rhinorrhea, sore throat and trouble swallowing. Respiratory: Negative for cough, shortness of breath and wheezing. Cardiovascular: Negative for chest pain, palpitations and leg swelling. Gastrointestinal: Negative. Genitourinary: Negative. Neurological: Negative for dizziness and headaches. Hematological: Does not bruise/bleed easily. Psychiatric/Behavioral: Positive for dysphoric mood. Negative for suicidal ideas. The patient is nervous/anxious.         GENERAL PHYSICAL EXAM     Vitals: Review current vital signs per RN flow sheet. GENERAL APPEARANCE:   Manny Villa is 64 y.o.,  female, not obese, nourished, conscious, alert. Does not appear to be in any distress or pain at this time. Physical Exam   Constitutional: She is oriented to person, place, and time. She appears well-developed and well-nourished. Non-toxic appearance. She does not have a sickly appearance. She does not appear ill. No distress. HENT:   Head: Normocephalic. Right Ear: External ear normal.   Left Ear: External ear normal.   Mouth/Throat: Oropharynx is clear and moist and mucous membranes are normal. No oropharyngeal exudate, posterior oropharyngeal edema, posterior oropharyngeal erythema or tonsillar abscesses. Eyes: Pupils are equal, round, and reactive to light. Conjunctivae are normal. Right eye exhibits no discharge. Left eye exhibits no discharge. Cardiovascular: Normal rate, regular rhythm, normal heart sounds and intact distal pulses. Pulses:       Radial pulses are 2+ on the right side and 2+ on the left side. Dorsalis pedis pulses are 2+ on the right side and 2+ on the left side. Posterior tibial pulses are 2+ on the right side and 2+ on the left side. Pulmonary/Chest: Effort normal and breath sounds normal. No accessory muscle usage. No respiratory distress. She has no decreased breath sounds. She has no wheezes. She has no rhonchi. She has no rales. Abdominal: Soft. Bowel sounds are normal. She exhibits no distension and no mass. There is no abdominal tenderness. There is no rebound and no guarding. Musculoskeletal: Normal range of motion. Right lower leg: She exhibits no tenderness and no swelling. Left lower leg: She exhibits no tenderness and no swelling. Neurological: She is alert and oriented to person, place, and time. Skin: Skin is warm and dry. She is not diaphoretic. Psychiatric: Her behavior is normal. Her mood appears anxious.  She expresses no homicidal and no suicidal ideation. She expresses no suicidal plans and no homicidal plans.        RECENT LAB WORK     Lab Results   Component Value Date     04/06/2021    K 4.6 04/06/2021     04/06/2021    CO2 27 04/06/2021    BUN 15 04/06/2021    CREATININE 1.08 (H) 04/06/2021    GLUCOSE 110 (H) 04/06/2021    CALCIUM 9.5 04/06/2021    PROT 6.4 04/05/2021    LABALBU 4.3 04/05/2021    BILITOT 0.29 (L) 04/05/2021    ALKPHOS 58 04/05/2021    AST 11 04/05/2021    ALT 15 04/05/2021    LABGLOM 52 (L) 04/06/2021    GFRAA >60 04/06/2021     Lab Results   Component Value Date    WBC 5.9 04/05/2021    HGB 13.9 04/05/2021    HCT 41.6 04/05/2021    MCV 92.3 04/05/2021     04/05/2021     PROVISIONAL DIAGNOSES / SURGERY:      Severe recurrent major depression with psychotic features    ECT TX    Patient Active Problem List    Diagnosis Date Noted    Severe recurrent major depression with psychotic features ([facility] [address] 04/02/2021    Major depression, single episode 04/01/2021           BLADIMIR Parks CNP on 4/26/2021 at 6:34 AM

## 2021-04-26 NOTE — PROGRESS NOTES
BP CUFF DEFLATED LEFT ANKLE    PULSE WIDTH- 1.0  FREQUENCY- 40  DURATION % POWER- 30  CURRENT- 0.9  EEG- 25  MOTOR- 0  STATIC- 1610  DYNAMIC- 250

## 2021-04-29 ENCOUNTER — ANESTHESIA EVENT (OUTPATIENT)
Dept: POSTOP/PACU | Age: 61
End: 2021-04-29

## 2021-04-30 ENCOUNTER — HOSPITAL ENCOUNTER (OUTPATIENT)
Dept: POSTOP/PACU | Age: 61
Discharge: HOME | End: 2021-04-30
Payer: COMMERCIAL

## 2021-04-30 ENCOUNTER — ANESTHESIA (OUTPATIENT)
Dept: POSTOP/PACU | Age: 61
End: 2021-04-30

## 2021-04-30 VITALS
DIASTOLIC BLOOD PRESSURE: 72 MMHG | OXYGEN SATURATION: 92 % | WEIGHT: 162 LBS | RESPIRATION RATE: 17 BRPM | TEMPERATURE: 97.7 F | HEART RATE: 65 BPM | SYSTOLIC BLOOD PRESSURE: 119 MMHG | BODY MASS INDEX: 26.03 KG/M2 | HEIGHT: 66 IN

## 2021-04-30 VITALS — TEMPERATURE: 98.6 F | OXYGEN SATURATION: 100 %

## 2021-04-30 DIAGNOSIS — Z01.818 PREOPERATIVE CLEARANCE: ICD-10-CM

## 2021-04-30 DIAGNOSIS — F33.3 SEVERE RECURRENT MAJOR DEPRESSION WITH PSYCHOTIC FEATURES (HCC): Primary | ICD-10-CM

## 2021-04-30 PROCEDURE — 90870 ELECTROCONVULSIVE THERAPY: CPT

## 2021-04-30 PROCEDURE — 90870 ELECTROCONVULSIVE THERAPY: CPT | Performed by: PSYCHIATRY & NEUROLOGY

## 2021-04-30 PROCEDURE — 3700000000 HC ANESTHESIA ATTENDED CARE

## 2021-04-30 PROCEDURE — 7100000001 HC PACU RECOVERY - ADDTL 15 MIN

## 2021-04-30 PROCEDURE — 2500000003 HC RX 250 WO HCPCS: Performed by: NURSE ANESTHETIST, CERTIFIED REGISTERED

## 2021-04-30 PROCEDURE — 2580000003 HC RX 258: Performed by: ANESTHESIOLOGY

## 2021-04-30 PROCEDURE — 7100000000 HC PACU RECOVERY - FIRST 15 MIN

## 2021-04-30 PROCEDURE — 87636 SARSCOV2 & INF A&B AMP PRB: CPT

## 2021-04-30 PROCEDURE — 6360000002 HC RX W HCPCS: Performed by: NURSE ANESTHETIST, CERTIFIED REGISTERED

## 2021-04-30 RX ORDER — SODIUM CHLORIDE 9 MG/ML
25 INJECTION, SOLUTION INTRAVENOUS PRN
Status: DISCONTINUED | OUTPATIENT
Start: 2021-04-30 | End: 2021-05-01 | Stop reason: HOSPADM

## 2021-04-30 RX ORDER — OXYCODONE HYDROCHLORIDE AND ACETAMINOPHEN 5; 325 MG/1; MG/1
2 TABLET ORAL PRN
Status: ACTIVE | OUTPATIENT
Start: 2021-04-30 | End: 2021-04-30

## 2021-04-30 RX ORDER — LABETALOL HYDROCHLORIDE 5 MG/ML
5 INJECTION, SOLUTION INTRAVENOUS EVERY 10 MIN PRN
Status: DISCONTINUED | OUTPATIENT
Start: 2021-04-30 | End: 2021-05-01 | Stop reason: HOSPADM

## 2021-04-30 RX ORDER — ONDANSETRON 2 MG/ML
4 INJECTION INTRAMUSCULAR; INTRAVENOUS
Status: ACTIVE | OUTPATIENT
Start: 2021-04-30 | End: 2021-04-30

## 2021-04-30 RX ORDER — LIDOCAINE HYDROCHLORIDE 10 MG/ML
1 INJECTION, SOLUTION EPIDURAL; INFILTRATION; INTRACAUDAL; PERINEURAL
Status: ACTIVE | OUTPATIENT
Start: 2021-04-30 | End: 2021-04-30

## 2021-04-30 RX ORDER — OXYCODONE HYDROCHLORIDE AND ACETAMINOPHEN 5; 325 MG/1; MG/1
1 TABLET ORAL PRN
Status: ACTIVE | OUTPATIENT
Start: 2021-04-30 | End: 2021-04-30

## 2021-04-30 RX ORDER — SODIUM CHLORIDE 0.9 % (FLUSH) 0.9 %
10 SYRINGE (ML) INJECTION EVERY 12 HOURS SCHEDULED
Status: DISCONTINUED | OUTPATIENT
Start: 2021-04-30 | End: 2021-05-01 | Stop reason: HOSPADM

## 2021-04-30 RX ORDER — SODIUM CHLORIDE, SODIUM LACTATE, POTASSIUM CHLORIDE, CALCIUM CHLORIDE 600; 310; 30; 20 MG/100ML; MG/100ML; MG/100ML; MG/100ML
INJECTION, SOLUTION INTRAVENOUS CONTINUOUS
Status: DISCONTINUED | OUTPATIENT
Start: 2021-04-30 | End: 2021-05-01 | Stop reason: HOSPADM

## 2021-04-30 RX ORDER — SUCCINYLCHOLINE CHLORIDE 20 MG/ML
INJECTION INTRAMUSCULAR; INTRAVENOUS PRN
Status: DISCONTINUED | OUTPATIENT
Start: 2021-04-30 | End: 2021-04-30 | Stop reason: SDUPTHER

## 2021-04-30 RX ORDER — SODIUM CHLORIDE 0.9 % (FLUSH) 0.9 %
10 SYRINGE (ML) INJECTION PRN
Status: DISCONTINUED | OUTPATIENT
Start: 2021-04-30 | End: 2021-05-01 | Stop reason: HOSPADM

## 2021-04-30 RX ORDER — SUCCINYLCHOLINE/SOD CL,ISO/PF 200MG/10ML
SYRINGE (ML) INTRAVENOUS
Status: DISPENSED
Start: 2021-04-30 | End: 2021-04-30

## 2021-04-30 RX ORDER — DIPHENHYDRAMINE HYDROCHLORIDE 50 MG/ML
12.5 INJECTION INTRAMUSCULAR; INTRAVENOUS
Status: ACTIVE | OUTPATIENT
Start: 2021-04-30 | End: 2021-04-30

## 2021-04-30 RX ADMIN — SODIUM CHLORIDE, POTASSIUM CHLORIDE, SODIUM LACTATE AND CALCIUM CHLORIDE: 600; 310; 30; 20 INJECTION, SOLUTION INTRAVENOUS at 07:22

## 2021-04-30 RX ADMIN — SUCCINYLCHOLINE CHLORIDE 80 MG: 20 INJECTION, SOLUTION INTRAMUSCULAR; INTRAVENOUS at 07:34

## 2021-04-30 RX ADMIN — Medication 80 MG: at 07:34

## 2021-04-30 ASSESSMENT — PAIN SCALES - GENERAL: PAINLEVEL_OUTOF10: 0

## 2021-04-30 ASSESSMENT — LIFESTYLE VARIABLES: SMOKING_STATUS: 1

## 2021-04-30 NOTE — ANESTHESIA POSTPROCEDURE EVALUATION
Department of Anesthesiology  Postprocedure Note    Patient: Jorge Guadarrama  MRN: [id number]  YOB: 1960  Date of evaluation: 4/30/2021  Time:  8:59 AM     Procedure Summary     Date: 04/30/21 Room / Location: [address] [place] [address] PACU    Anesthesia Start: 0729 Anesthesia Stop: Benny Yassine    Procedure: ECT W/ ANESTHESIA Diagnosis:     Scheduled Providers:  Responsible Provider: Ze Wilson MD    Anesthesia Type: MAC ASA Status: 3          Anesthesia Type: MAC    Tarik Phase I: Tarik Score: 10    Tarik Phase II:      Last vitals: Reviewed and per EMR flowsheets.        Anesthesia Post Evaluation    Comments: POST- ANESTHESIA EVALUATION       Pt Name: Jorge Guadarrama  MRN: [id number]  YOB: 1960  Date of evaluation: 4/30/2021  Time:  8:59 AM      /72   Pulse 65   Temp 97.7 °F (36.5 °C) (Infrared)   Resp 17   Ht 5' 6\" (1.676 m)   Wt 162 lb (73.5 kg)   SpO2 92%   BMI 26.15 kg/m²      Consciousness Level  Awake  Cardiopulmonary Status  Stable  Pain Adequately Treated YES  Nausea / Vomiting  NO  Adequate Hydration  YES  Anesthesia Related Complications NONE      Electronically signed by Ze Wilson MD on 4/30/2021 at 8:59 AM

## 2021-04-30 NOTE — ANESTHESIA PRE PROCEDURE
Department of Anesthesiology  Preprocedure Note       Name:  Dwayne Krishnan   Age:  64 y.o.  :  1960                                          MRN:  [id number]         Date:  2021      Surgeon: Vivi Martin  Procedure: ECT with Anesthesia    Medications prior to admission:   Prior to Admission medications    Medication Sig Start Date End Date Taking?  Authorizing Provider   acetaminophen (TYLENOL) 500 MG tablet Take 500 mg by mouth every 6 hours as needed for Pain    Historical Provider, MD   atorvastatin (LIPITOR) 10 MG tablet Take 2 tablets by mouth daily 21   Shola Killian MD   busPIRone (BUSPAR) 15 MG tablet Take 15 mg by mouth 2 times daily 21   Shola Killian MD   cloNIDine (CATAPRES) 0.1 MG tablet Take 1 tablet by mouth nightly 21   Shola Killian MD   lithium (LITHOBID) 300 MG extended release tablet Take 1 tablet by mouth daily 21   Shola Killian MD   omeprazole (PRILOSEC) 20 MG delayed release capsule Take 1 capsule by mouth daily 21   Shola Killian MD   sertraline (ZOLOFT) 50 MG tablet Take 3 tablets by mouth daily 21   Shola Killian MD   traZODone (DESYREL) 50 MG tablet Take 1 tablet by mouth nightly as needed for Sleep 21   Shola Killian MD       Current medications:    Current Outpatient Medications   Medication Sig Dispense Refill    acetaminophen (TYLENOL) 500 MG tablet Take 500 mg by mouth every 6 hours as needed for Pain      atorvastatin (LIPITOR) 10 MG tablet Take 2 tablets by mouth daily 30 tablet 0    busPIRone (BUSPAR) 15 MG tablet Take 15 mg by mouth 2 times daily 60 tablet 0    cloNIDine (CATAPRES) 0.1 MG tablet Take 1 tablet by mouth nightly 60 tablet 3    lithium (LITHOBID) 300 MG extended release tablet Take 1 tablet by mouth daily 60 tablet 3    omeprazole (PRILOSEC) 20 MG delayed release capsule Take 1 capsule by mouth daily 30 capsule 3    sertraline (ZOLOFT) 50 MG tablet Take 3 tablets by mouth daily 30 tablet 3    traZODone (DESYREL) 50 MG tablet Take 1 tablet by mouth nightly as needed for Sleep 30 tablet 0     No current facility-administered medications for this visit. Facility-Administered Medications Ordered in Other Visits   Medication Dose Route Frequency Provider Last Rate Last Admin    lactated ringers infusion   Intravenous Continuous Mindy Jiménez  mL/hr at 04/30/21 0722 New Bag at 04/30/21 0722    sodium chloride flush 0.9 % injection 10 mL  10 mL Intravenous 2 times per day Mindy Jiménez MD        sodium chloride flush 0.9 % injection 10 mL  10 mL Intravenous PRN Mindy Jiménez MD        0.9 % sodium chloride infusion  25 mL Intravenous PRN Mindy Jiménez MD        lidocaine PF 1 % injection 1 mL  1 mL Intradermal Once PRN Mindy Jiménez MD        methohexital (BREVITAL SODIUM) 100 MG/10ML injection             succinylcholine (ANECTINE) 200 MG/10ML injection                Allergies:     Allergies   Allergen Reactions    Bupropion        Problem List:    Patient Active Problem List   Diagnosis Code    Major depression, single episode F32.9    Severe recurrent major depression with psychotic features ([address] F33.3       Past Medical History:        Diagnosis Date    Anxiety     Depression     Fracture of right wrist     GERD (gastroesophageal reflux disease)     Hyperlipidemia     Left wrist fracture     CHILDHOOD    KURT (obstructive sleep apnea)     Renal insufficiency     Creatinine 1.08 on 4-6-21    S/P ECT (electroconvulsive therapy)     Severe recurrent major depression with psychotic features ([address] 04/02/2021    Short-term memory loss     Suicidal ideation        Past Surgical History:        Procedure Laterality Date    COSMETIC SURGERY      face- CHILDHOOD    EYE SURGERY Bilateral     lasik    FRACTURE SURGERY Right     wrist    OTHER SURGICAL HISTORY      ECT tx's    WISDOM TOOTH EXTRACTION         Social History:    Social History     Tobacco Use    Smoking status: Current Some Day Smoker     Types: Cigarettes    Smokeless tobacco: Never Used    Tobacco comment: SMOKES 3 CIGARETTES OR SO A DAY, BUT NOT EVERY DAY   Substance Use Topics    Alcohol use: Yes     Comment: occas wine                                Ready to quit: Not Answered  Counseling given: Not Answered  Comment: SMOKES 3 CIGARETTES OR SO A DAY, BUT NOT EVERY DAY      Vital Signs (Current): There were no vitals filed for this visit. BP Readings from Last 3 Encounters:   04/30/21 100/64   04/26/21 (!) 141/75   04/23/21 119/71       NPO Status:                                                                                 BMI:   Wt Readings from Last 3 Encounters:   04/30/21 162 lb (73.5 kg)   04/26/21 170 lb (77.1 kg)   04/23/21 170 lb (77.1 kg)     There is no height or weight on file to calculate BMI.    CBC:   Lab Results   Component Value Date    WBC 5.9 04/05/2021    RBC 4.51 04/05/2021    HGB 13.9 04/05/2021    HCT 41.6 04/05/2021    MCV 92.3 04/05/2021    RDW 12.9 04/05/2021     04/05/2021       CMP:   Lab Results   Component Value Date     04/06/2021    K 4.6 04/06/2021     04/06/2021    CO2 27 04/06/2021    BUN 15 04/06/2021    CREATININE 1.08 04/06/2021    GFRAA >60 04/06/2021    LABGLOM 52 04/06/2021    GLUCOSE 110 04/06/2021    PROT 6.4 04/05/2021    CALCIUM 9.5 04/06/2021    BILITOT 0.29 04/05/2021    ALKPHOS 58 04/05/2021    AST 11 04/05/2021    ALT 15 04/05/2021       POC Tests: No results for input(s): POCGLU, POCNA, POCK, POCCL, POCBUN, POCHEMO, POCHCT in the last 72 hours.     Coags: No results found for: PROTIME, INR, APTT    HCG (If Applicable): No results found for: PREGTESTUR, PREGSERUM, HCG, HCGQUANT     ABGs: No results found for: PHART, PO2ART, VTK0VIB, RYD1PHE, BEART, H4RJBQIY     Type & Screen (If Applicable):  No results found for: LABABO, LABRH    Drug/Infectious Status (If Applicable):  No results found for: HIV, HEPCAB    COVID-19 Screening (If Applicable):   Lab Results   Component Value Date    COVID19 Not Detected 04/23/2021           Anesthesia Evaluation  Patient summary reviewed and Nursing notes reviewed no history of anesthetic complications:   Airway: Mallampati: II  TM distance: >3 FB   Neck ROM: full  Mouth opening: > = 3 FB Dental: normal exam         Pulmonary:normal exam  breath sounds clear to auscultation  (+) sleep apnea:  current smoker                           Cardiovascular:    (+) hyperlipidemia        Rhythm: regular  Rate: normal                    Neuro/Psych:   (+) psychiatric history:            GI/Hepatic/Renal:   (+) GERD:, renal disease: CRI,           Endo/Other: Negative Endo/Other ROS                    Abdominal:           Vascular:                                          Anesthesia Plan      MAC     ASA 3       Induction: intravenous. MIPS: Prophylactic antiemetics administered. Anesthetic plan and risks discussed with patient. Plan discussed with CRNA.                   Eddie Shrestha MD   4/30/2021

## 2021-04-30 NOTE — PROGRESS NOTES
BP CUFF DEFLATED LEFT ANKLE    PULSE WIDTH- 1.0  FREQUENCY- 40  DURATION % POWER- 30  CURRENT- 0.9  EEG- 68  MOTOR- 26  STATIC- 2060  DYNAMIC- 290

## 2021-04-30 NOTE — PROGRESS NOTES
Pre ECT Assessment Note  [facility], [place]  Psychiatry  4/30/2021      Chuy Solomon  1960  [id number]      Subjective:     Patient is a 64 y.o.  female seen for an evaluation prior to today's electroconvulsive therapy treatment. Today is treatment number 9, utilizing bilateral.  This is course number 1. The patient has previously received no ECT. Kel Jean reports that her mood is improving, she states that she still struggles with motivation though has started to make goals to focus on different rooms in her house completing different tasks. She reports that she is starting a smaller goals and has been able to successfully accomplish them. She reports that she has been sleeping okay, denies any nightmares. We discussed her trazodone and clonidine, she reports that she has been taking the medications every other day. We discussed that it is okay for her to take trazodone as needed but the clonidine should be taken nightly unless she feels she no longer needs it. This was explained to her  as well. She reports an improvement in crying spells. Her  reports that she spent the day with her grandchildren yesterday and that things went \"really well\". She denies any suicidal ideation, intent or plan. She continues to struggle with cognitive side effects, she does feel that they had increased somewhat with ECT. We explored whether to transition to right unilateral, at this time we will continue with bilateral and reduce the frequency of treatments to once weekly. She was sent home with a depression screening scale to complete on Tuesday evening to bring with her to [address] [place] [address] appointment.     Screening Tools:    MADRS Score 4/30/2021 = 42, repeat score 4/15/2021 = 25, repeat next Tuesday    Patient Active Problem List    Diagnosis Date Noted    Severe recurrent major depression with psychotic features ([facility] 04/02/2021    Major depression, single episode 04/01/2021     Past Medical History:   Diagnosis Date    Anxiety     Depression     Fracture of right wrist     GERD (gastroesophageal reflux disease)     Hyperlipidemia     Left wrist fracture     CHILDHOOD    KURT (obstructive sleep apnea)     Renal insufficiency     Creatinine 1.08 on 4-6-21    S/P ECT (electroconvulsive therapy)     Severe recurrent major depression with psychotic features ([facility] 04/02/2021    Short-term memory loss     Suicidal ideation       Past Surgical History:   Procedure Laterality Date    COSMETIC SURGERY      face- CHILDHOOD    EYE SURGERY Bilateral     lasik    FRACTURE SURGERY Right     wrist    OTHER SURGICAL HISTORY      ECT tx's    WISDOM TOOTH EXTRACTION        Not in a hospital admission. Allergies   Allergen Reactions    Bupropion       Social History     Tobacco Use    Smoking status: Current Some Day Smoker     Types: Cigarettes    Smokeless tobacco: Never Used    Tobacco comment: SMOKES 3 CIGARETTES OR SO A DAY, BUT NOT EVERY DAY   Substance Use Topics    Alcohol use: Yes     Comment: occas wine      Family History   Problem Relation Age of Onset    Bipolar Disorder Daughter         X4 DAUGHTERS, SOME HAVE BIPOLAR/ANXIETY/ADHD    Anxiety Disorder Daughter     ADHD Daughter           Review Of Systems:       Psychiatric Review Of Systems:  Positive for improving depression. Continues to struggle with motivation though this is also improving. Appetite remains decreased. Sleep good. Denies suicidal ideation or thinking about \"God taking her\".       Objective:       Mental Status Evaluation:  Appearance:  age appropriate and casually dressed   Behavior:   Calm, cooperative   Speech:  normal pitch, normal volume and soft   Mood:   Depressed, though improving   Affect:  mood-congruent   Thought Process:  goal directed   Thought Content:  suicidal ideation improved   Sensorium:  person, place, time/date and situation   Cognition:   Impaired   Insight:  good   Judgment:  good Assessment:     Diagnosis: Major depressive disorder, recurrent, severe with psychotic symptoms    Plan:     Taper frequency to once weekly, will repeat depression screening scale Tuesday. She is aware that she should bring the MADRS with her to Wednesdays treatment. Update consent, labs and H&P every 30 days while undergoing ECT treatment. Patient verbalizes understanding of risks/benefits/alternatives to ECT. Last informed consent signed on 4/23/2021.
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
## 2021-05-03 ENCOUNTER — HOSPITAL ENCOUNTER (OUTPATIENT)
Dept: PREADMISSION TESTING | Age: 61
Discharge: HOME | End: 2021-05-07
Payer: COMMERCIAL

## 2021-05-03 VITALS — HEIGHT: 66 IN | WEIGHT: 165 LBS | BODY MASS INDEX: 26.52 KG/M2

## 2021-05-03 NOTE — PROGRESS NOTES
Pre-op Instructions For Out-Patient ECT    Medication Instructions:  · Please stop herbs and any supplements now (includes vitamins and minerals). · Please contact your surgeon and prescribing physician for pre-op instructions for any blood thinners. · If you have inhalers/aerosol treatments at home, please use them the morning of your surgery and bring the inhalers with you to the hospital.    · Please take the following medications the morning of your surgery with a sip of water:    Tylenol & Omeprazole    Surgery Instructions:  1. After midnight before surgery:  Do not eat or drink anything, including water, mints, gum, and hard candy. You may brush your teeth without swallowing. No smoking, chewing tobacco, or street drugs. 2. Please shower or bathe before surgery. 3. Please do not wear any cologne, lotion, powder, deodorant, jewelry, piercings, perfume, makeup, nail polish, hair accessories, or hair spray on the day of surgery. Wear loose comfortable clothing. 4. Leave your valuables at home. Bring a storage case for any glasses/contacts. 5. An adult who is responsible for you MUST drive you home and should be with you for the first 24 hours after surgery. The Day of Surgery:  · Arrive at [address] [place] [address] Surgery Entrance at the time directed by your surgeon and check in at the desk. · If you have a living will or healthcare power of , please bring a copy. · You will be taken to the pre-op holding area where you will be prepared for your ECT. A physical assessment will be performed by a nurse practitioner or house officer. Your IV will be started and you will meet your anesthesiologist.    · We are currently limiting visitors to only one designated person in the pre-op holding area. When you go for your ECT, your family will be directed to the surgical waiting room, where the doctor should speak with them after your surgery.     · You will remain in the recovery room until you are awake and stable. You will then be discharged from recovery room or you will go to the short stay unit for preparation to be discharged. Only your one designated person is allowed to come to short stay for your discharge.

## 2021-05-05 ENCOUNTER — ANESTHESIA (OUTPATIENT)
Dept: POSTOP/PACU | Age: 61
End: 2021-05-05

## 2021-05-05 ENCOUNTER — ANESTHESIA EVENT (OUTPATIENT)
Dept: POSTOP/PACU | Age: 61
End: 2021-05-05

## 2021-05-05 ENCOUNTER — HOSPITAL ENCOUNTER (OUTPATIENT)
Dept: POSTOP/PACU | Age: 61
Discharge: HOME | End: 2021-05-05
Payer: COMMERCIAL

## 2021-05-05 VITALS
BODY MASS INDEX: 26.52 KG/M2 | DIASTOLIC BLOOD PRESSURE: 76 MMHG | HEART RATE: 68 BPM | OXYGEN SATURATION: 96 % | TEMPERATURE: 98.2 F | WEIGHT: 165 LBS | RESPIRATION RATE: 20 BRPM | SYSTOLIC BLOOD PRESSURE: 133 MMHG | HEIGHT: 66 IN

## 2021-05-05 VITALS — TEMPERATURE: 96.8 F | OXYGEN SATURATION: 99 %

## 2021-05-05 DIAGNOSIS — Z01.818 PREOPERATIVE CLEARANCE: ICD-10-CM

## 2021-05-05 DIAGNOSIS — F33.3 SEVERE RECURRENT MAJOR DEPRESSION WITH PSYCHOTIC FEATURES (HCC): Primary | ICD-10-CM

## 2021-05-05 DIAGNOSIS — Z01.818 PREOPERATIVE CLEARANCE: Primary | ICD-10-CM

## 2021-05-05 LAB
ANION GAP SERPL CALCULATED.3IONS-SCNC: 8 MMOL/L (ref 9–17)
BUN BLDV-MCNC: 14 MG/DL (ref 6–20)
BUN/CREAT BLD: ABNORMAL (ref 9–20)
CALCIUM SERPL-MCNC: 8.6 MG/DL (ref 8.6–10.4)
CHLORIDE BLD-SCNC: 107 MMOL/L (ref 98–107)
CO2: 23 MMOL/L (ref 20–31)
CREAT SERPL-MCNC: 0.9 MG/DL (ref 0.5–0.9)
GFR AFRICAN AMERICAN: >60 ML/MIN
GFR NON-AFRICAN AMERICAN: >60 ML/MIN
GFR SERPL CREATININE-BSD FRML MDRD: ABNORMAL ML/MIN/{1.73_M2}
GFR SERPL CREATININE-BSD FRML MDRD: ABNORMAL ML/MIN/{1.73_M2}
GLUCOSE BLD-MCNC: 98 MG/DL (ref 70–99)
INFLUENZA A: NEGATIVE
INFLUENZA B: NEGATIVE
LITHIUM DATE LAST DOSE: ABNORMAL
LITHIUM DOSE AMOUNT: ABNORMAL
LITHIUM DOSE TIME: ABNORMAL
LITHIUM LEVEL: 0.3 MMOL/L (ref 0.6–1.2)
POTASSIUM SERPL-SCNC: 4.4 MMOL/L (ref 3.7–5.3)
SARS-COV-2 RNA, RT PCR: NOT DETECTED
SODIUM BLD-SCNC: 138 MMOL/L (ref 135–144)
SOURCE: NORMAL
SPECIMEN DESCRIPTION: NORMAL

## 2021-05-05 PROCEDURE — 6360000002 HC RX W HCPCS: Performed by: NURSE ANESTHETIST, CERTIFIED REGISTERED

## 2021-05-05 PROCEDURE — 80048 BASIC METABOLIC PNL TOTAL CA: CPT

## 2021-05-05 PROCEDURE — 87636 SARSCOV2 & INF A&B AMP PRB: CPT

## 2021-05-05 PROCEDURE — 7100000000 HC PACU RECOVERY - FIRST 15 MIN

## 2021-05-05 PROCEDURE — 90870 ELECTROCONVULSIVE THERAPY: CPT

## 2021-05-05 PROCEDURE — 36415 COLL VENOUS BLD VENIPUNCTURE: CPT

## 2021-05-05 PROCEDURE — 90870 ELECTROCONVULSIVE THERAPY: CPT | Performed by: PSYCHIATRY & NEUROLOGY

## 2021-05-05 PROCEDURE — 2580000003 HC RX 258: Performed by: ANESTHESIOLOGY

## 2021-05-05 PROCEDURE — 7100000001 HC PACU RECOVERY - ADDTL 15 MIN

## 2021-05-05 PROCEDURE — 2500000003 HC RX 250 WO HCPCS: Performed by: NURSE ANESTHETIST, CERTIFIED REGISTERED

## 2021-05-05 PROCEDURE — 3700000000 HC ANESTHESIA ATTENDED CARE

## 2021-05-05 PROCEDURE — 80178 ASSAY OF LITHIUM: CPT

## 2021-05-05 RX ORDER — SUCCINYLCHOLINE CHLORIDE 20 MG/ML
INJECTION INTRAMUSCULAR; INTRAVENOUS PRN
Status: DISCONTINUED | OUTPATIENT
Start: 2021-05-05 | End: 2021-05-05 | Stop reason: SDUPTHER

## 2021-05-05 RX ORDER — DIPHENHYDRAMINE HYDROCHLORIDE 50 MG/ML
12.5 INJECTION INTRAMUSCULAR; INTRAVENOUS
Status: ACTIVE | OUTPATIENT
Start: 2021-05-05 | End: 2021-05-05

## 2021-05-05 RX ORDER — ONDANSETRON 2 MG/ML
4 INJECTION INTRAMUSCULAR; INTRAVENOUS
Status: ACTIVE | OUTPATIENT
Start: 2021-05-05 | End: 2021-05-05

## 2021-05-05 RX ORDER — OXYCODONE HYDROCHLORIDE AND ACETAMINOPHEN 5; 325 MG/1; MG/1
1 TABLET ORAL PRN
Status: ACTIVE | OUTPATIENT
Start: 2021-05-05 | End: 2021-05-05

## 2021-05-05 RX ORDER — SODIUM CHLORIDE, SODIUM LACTATE, POTASSIUM CHLORIDE, CALCIUM CHLORIDE 600; 310; 30; 20 MG/100ML; MG/100ML; MG/100ML; MG/100ML
INJECTION, SOLUTION INTRAVENOUS CONTINUOUS
Status: DISCONTINUED | OUTPATIENT
Start: 2021-05-05 | End: 2021-05-06 | Stop reason: HOSPADM

## 2021-05-05 RX ORDER — OXYCODONE HYDROCHLORIDE AND ACETAMINOPHEN 5; 325 MG/1; MG/1
2 TABLET ORAL PRN
Status: ACTIVE | OUTPATIENT
Start: 2021-05-05 | End: 2021-05-05

## 2021-05-05 RX ORDER — SUCCINYLCHOLINE/SOD CL,ISO/PF 200MG/10ML
SYRINGE (ML) INTRAVENOUS
Status: DISPENSED
Start: 2021-05-05 | End: 2021-05-05

## 2021-05-05 RX ORDER — LABETALOL HYDROCHLORIDE 5 MG/ML
5 INJECTION, SOLUTION INTRAVENOUS EVERY 10 MIN PRN
Status: DISCONTINUED | OUTPATIENT
Start: 2021-05-05 | End: 2021-05-06 | Stop reason: HOSPADM

## 2021-05-05 RX ADMIN — SODIUM CHLORIDE, POTASSIUM CHLORIDE, SODIUM LACTATE AND CALCIUM CHLORIDE: 600; 310; 30; 20 INJECTION, SOLUTION INTRAVENOUS at 07:06

## 2021-05-05 RX ADMIN — Medication 80 MG: at 07:45

## 2021-05-05 RX ADMIN — SUCCINYLCHOLINE CHLORIDE 80 MG: 20 INJECTION, SOLUTION INTRAMUSCULAR; INTRAVENOUS at 07:45

## 2021-05-05 ASSESSMENT — PAIN - FUNCTIONAL ASSESSMENT: PAIN_FUNCTIONAL_ASSESSMENT: 0-10

## 2021-05-05 ASSESSMENT — LIFESTYLE VARIABLES: SMOKING_STATUS: 1

## 2021-05-05 NOTE — PROGRESS NOTES
sleep.    3    4 - Sleep reduced or broken by at least two hours. 5    6 - Less than two or three hours sleep. 5.   Reduced Appetite = 1 (Representing the feeling of a loss of appetite compared with when well)      Rate by loss of desire for food or the need to force oneself to eat.    0 - Normal or increased appetite. 1    2 - Slightly reduced appetite. 3    4 - No appetite. Food is tasteless. 5    6 - Needs persuasion to eat at all. 6.   Concentration Difficulties = 2 (Representing difficulties in collecting one's thoughts mounting to incapacitating lack of concentration)        Rate according to intensity, frequency, and degree of incapacity produced. 0 - No difficulties in concentrating. 1    2 - Occasional difficulties in collecting one's thoughts. 3    4 - Difficulties in concentrating and sustaining thought which reduces ability to read or hold a conversation. 5    6 - Unable to read or converse without great difficulty. 7.   Lassitude = 1 (Representing a difficulty getting started or slowness initiating and performing everyday activities)      0 - Hardly any difficulty in getting started. No sluggishness. 1    2 - Difficulties in starting activities. 3    4 - Difficulties in starting simple routine activities which are carried out with effort. 5    6 - Complete lassitude. Unable to do anything without help. 8.   Inability to Feel = 2 (Representing the subjective experience of reduced interest in the surroundings, or activities that normally give pleasure. The ability to react with adequate emotion to circumstances or people is reduced)      0 - Normal interest in the surroundings and in other people. 1    2 - Reduced ability to enjoy usual interests. 3    4 - Loss of interest in the surroundings. Loss of feelings or friends and acquaintances.     5    6 - The experience of being emotionally paralyzed, inability to feel anger, grief or pleasure and a complete or even painful failure to feel for close relatives and friends. 9.   Pessimistic Thoughts = 1 (Representing thoughts of guilt, inferiority, self-reproach, sinfulness, remorse and ruin)      0 - No pessimistic thoughts. 1    2 - Fluctuating ideas of failure, self-reproach or self depreciation. 3    4 - Persistent self-accusations, or definite but still rational ideas of guilt or sin. Increasingly pessimistic about the future. 5    6 - Delusions of ruin, remorse or unredeemable sin. Self-accusations which are absurd and unshakable. 10.  Suicidal Thoughts = 0 (Representing the feeling that life is not worth living, that a natural death would be welcome, suicidal thoughts, and preparations for suicide)      Suicidal attempts should not in themselves influence the rating. 0 - Enjoys life or takes it as it comes. 1    2 - Weary of life. Only fleeting suicidal thoughts. 3    4 - Probably better off dead. Suicidal thoughts are common, and suicide is considered as a possible solution, but without specific plans or intention. 5    6 - Explicit plans for suicide when there is an opportunity. Active preparation for suicide.

## 2021-05-05 NOTE — ANESTHESIA PRE PROCEDURE
Department of Anesthesiology  Preprocedure Note       Name:  Radha Ames   Age:  64 y.o.  :  1960                                          MRN:  [id number]         Date:  2021      Surgeon: Maritza Del Rio  Procedure: ECT with Anesthesia    Medications prior to admission:   Prior to Admission medications    Medication Sig Start Date End Date Taking?  Authorizing Provider   acetaminophen (TYLENOL) 500 MG tablet Take 500 mg by mouth every 6 hours as needed for Pain    Historical Provider, MD   atorvastatin (LIPITOR) 10 MG tablet Take 2 tablets by mouth daily 21   Lio Coelho MD   busPIRone (BUSPAR) 15 MG tablet Take 15 mg by mouth 2 times daily 21   Lio Coelho MD   cloNIDine (CATAPRES) 0.1 MG tablet Take 1 tablet by mouth nightly 21   Lio Coelho MD   lithium (LITHOBID) 300 MG extended release tablet Take 1 tablet by mouth daily 21   Lio Coelho MD   omeprazole (PRILOSEC) 20 MG delayed release capsule Take 1 capsule by mouth daily 21   Lio Coelho MD   sertraline (ZOLOFT) 50 MG tablet Take 3 tablets by mouth daily 21   Lio Coelho MD   traZODone (DESYREL) 50 MG tablet Take 1 tablet by mouth nightly as needed for Sleep 21   Lio Coelho MD       Current medications:    Current Outpatient Medications   Medication Sig Dispense Refill    acetaminophen (TYLENOL) 500 MG tablet Take 500 mg by mouth every 6 hours as needed for Pain      atorvastatin (LIPITOR) 10 MG tablet Take 2 tablets by mouth daily 30 tablet 0    busPIRone (BUSPAR) 15 MG tablet Take 15 mg by mouth 2 times daily 60 tablet 0    cloNIDine (CATAPRES) 0.1 MG tablet Take 1 tablet by mouth nightly 60 tablet 3    lithium (LITHOBID) 300 MG extended release tablet Take 1 tablet by mouth daily 60 tablet 3    omeprazole (PRILOSEC) 20 MG delayed release capsule Take 1 capsule by mouth daily 30 capsule 3    sertraline (ZOLOFT) 50 MG tablet Take 3 tablets by mouth daily 30 tablet 3    traZODone (DESYREL) 50 MG tablet Take 1 tablet by mouth nightly as needed for Sleep 30 tablet 0     No current facility-administered medications for this visit. Facility-Administered Medications Ordered in Other Visits   Medication Dose Route Frequency Provider Last Rate Last Admin    lactated ringers infusion   Intravenous Continuous Eddie Counter, MD           Allergies: Allergies   Allergen Reactions    Bupropion        Problem List:    Patient Active Problem List   Diagnosis Code    Major depression, single episode F32.9    Severe recurrent major depression with psychotic features ([address] F33.3       Past Medical History:        Diagnosis Date    Anxiety     Depression     Fracture of right wrist     GERD (gastroesophageal reflux disease)     Hyperlipidemia     Left wrist fracture     CHILDHOOD    KURT (obstructive sleep apnea)     CPAP nightly    Renal insufficiency     Creatinine 1.08 on 4-6-21    S/P ECT (electroconvulsive therapy)     Severe recurrent major depression with psychotic features ([facility] 04/02/2021    Short-term memory loss     Suicidal ideation        Past Surgical History:        Procedure Laterality Date    COSMETIC SURGERY      face- CHILDHOOD    EYE SURGERY Bilateral     lasik    FRACTURE SURGERY Right     wrist    OTHER SURGICAL HISTORY      ECT tx's    WISDOM TOOTH EXTRACTION         Social History:    Social History     Tobacco Use    Smoking status: Current Some Day Smoker     Types: Cigarettes    Smokeless tobacco: Never Used    Tobacco comment: SMOKES 3 CIGARETTES OR SO A DAY, BUT NOT EVERY DAY   Substance Use Topics    Alcohol use: Yes     Comment: occas wine                                Ready to quit: Not Answered  Counseling given: Not Answered  Comment: SMOKES 3 CIGARETTES OR SO A DAY, BUT NOT EVERY DAY      Vital Signs (Current): There were no vitals filed for this visit.                                            BP Readings from Last 3 Encounters: regular  Rate: normal                    Neuro/Psych:   (+) psychiatric history:            GI/Hepatic/Renal:   (+) GERD:, renal disease: CRI,           Endo/Other: Negative Endo/Other ROS                    Abdominal:           Vascular:                                          Anesthesia Plan      MAC     ASA 3       Induction: intravenous. MIPS: Prophylactic antiemetics administered. Anesthetic plan and risks discussed with patient. Plan discussed with CRNA.                   Néstor Marie MD   5/5/2021
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
## 2021-05-05 NOTE — ANESTHESIA POSTPROCEDURE EVALUATION
Department of Anesthesiology  Postprocedure Note    Patient: Janette Mcnulty  MRN: [id number]  YOB: 1960  Date of evaluation: 5/5/2021  Time:  8:33 AM     Procedure Summary     Date: 05/05/21 Room / Location: [address] [place] [address] PACU    Anesthesia Start: 6421 Anesthesia Stop: 4633    Procedure: ECT W/ ANESTHESIA Diagnosis: Major depressive disorder, recurrent, severe with psychotic symptoms    Scheduled Providers:  Responsible Provider: Nona Milligan MD    Anesthesia Type: MAC ASA Status: 3          Anesthesia Type: MAC    Tarik Phase I: Tarik Score: 5    Tarik Phase II:      Last vitals: Reviewed and per EMR flowsheets.        Anesthesia Post Evaluation    Comments: POST- ANESTHESIA EVALUATION       Pt Name: Janette Mcnulty  MRN: [id number]  YOB: 1960  Date of evaluation: 5/5/2021  Time:  8:33 AM      BP (!) 145/75   Pulse 81   Temp 98.2 °F (36.8 °C) (Infrared)   Resp 11   Ht 5' 6\" (1.676 m)   Wt 165 lb (74.8 kg)   SpO2 97%   BMI 26.63 kg/m²      Consciousness Level  Awake  Cardiopulmonary Status  Stable  Pain Adequately Treated YES  Nausea / Vomiting  NO  Adequate Hydration  YES  Anesthesia Related Complications NONE      Electronically signed by Nona Milligan MD on 5/5/2021 at 8:33 AM

## 2021-05-05 NOTE — PROGRESS NOTES
memory loss     Suicidal ideation       Past Surgical History:   Procedure Laterality Date    COSMETIC SURGERY      face- CHILDHOOD    EYE SURGERY Bilateral     lasik    FRACTURE SURGERY Right     wrist    OTHER SURGICAL HISTORY      ECT tx's    WISDOM TOOTH EXTRACTION        Not in a hospital admission. Allergies   Allergen Reactions    Bupropion       Social History     Tobacco Use    Smoking status: Current Some Day Smoker     Types: Cigarettes    Smokeless tobacco: Never Used    Tobacco comment: SMOKES 3 CIGARETTES OR SO A DAY, BUT NOT EVERY DAY   Substance Use Topics    Alcohol use: Yes     Comment: occas wine      Family History   Problem Relation Age of Onset    Bipolar Disorder Daughter         X4 DAUGHTERS, SOME HAVE BIPOLAR/ANXIETY/ADHD    Anxiety Disorder Daughter     ADHD Daughter           Review Of Systems:       Psychiatric Review Of Systems:  Depression continues to improve, low motivation lingering. More emotional over the last weekend, but engaging well with family. Some anxiety. No A/V hallucinations. No suicidal ideation, intent or plan. Objective:       Mental Status Evaluation:  Appearance:  age appropriate and casually dressed   Behavior:   Calm, cooperative   Speech:  normal pitch, normal volume and soft   Mood:   Depressed, though improving   Affect:  mood-congruent   Thought Process:  goal directed   Thought Content:  suicidal ideation improved   Sensorium:  person, place, time/date and situation   Cognition:   Impaired   Insight:  good   Judgment:  good     Assessment:     Diagnosis: Major depressive disorder, recurrent, severe with psychotic symptoms    Plan:     Transition to right unilateral, continue once weekly with intent to transition to every 2 weeks after next treatment. Update consent, labs and H&P every 30 days while undergoing ECT treatment. Patient verbalizes understanding of risks/benefits/alternatives to ECT.   Last informed consent signed on 5/5/2021.

## 2021-05-05 NOTE — PROGRESS NOTES
BP CUFF DEFLATED LEFT ANKLE    PULSE WIDTH- 0.5  FREQUENCY- 60  DURATION % POWER- 50  CURRENT- 0.9  EEG- 69  MOTOR- 31  STATIC- 1210  DYNAMIC- 230
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
## 2021-05-05 NOTE — PROCEDURES
Right Unilateral ECT Procedure Report  Glenn Roy   5/5/2021  1960         Attending:Duc Wagner MD  Preprocedure diagnosis: Major depressive disorder, recurrent, severe with psychotic symptoms (F33.3)  Postprocedure diagnosis: SAME AS PRE-PROCEDURE DIAGNOSIS  Treatment Number: This is treatment # 10 (1st RUL , 9 previous BL)   Patient Status: Outpatient   Type of ECT: Ultra Brief Pulse Right Unilateral  Medications  Preprocedure: Tylenol 650mg  Anesthetic: Methohexital 80 mg  Paralytic: Succinylcholine 80 mg  Post procedure: none needed     Cuff placement: Left Lower Extremity    Thymatron Settings 1st Stimulus:     Pulse width: 0.5 ms   Frequency:  60 hz   % Power:   50 %   Static Impedance: 1210 ohms             Dynamic Impedance: 230 ohms             Motor Seizure Duration: 31 seconds  EEG Seizure Duration: 69 seconds                 The patient's ECT treatment was performed using Thymatron machine  . Timeout:  Time out was performed using two patient identifiers and confirmation of procedure. Patient Preparation: Preprocedure documentation, labs, H&P were all verified and reviewed. The patient was placed in supine position. EEG leads were placed for monitoring of seizure. [place] areas bilaterally cleaned and prepped. Pre-Tac solution was applied over stimulus electrode site on right temporal area. Saline solution was used to moisten the scalp and hair 1 inch ipsilateral to the right side of the vertex of the skull. Thymapad's then applied to stimulus electrode site bilaterally with the center of the right temporal lead placed approximately 1 inch superior to the midpoint of line between canthus and the tragus and the center of the 2nd lead place one inch ipsilateral to the right side of the vertex of the skull. The patient was pre-oxygenated. Procedure in detail: Medications were administered by anesthesia using intravenous access at the doses listed previously in this summary. Anesthetic administered and once confirmation the patient is anesthetized, the patient's blood pressure cuff on lower extremity was inflated to 100mmHg in excess of patient's blood pressure. At this time, the neuromuscular blockade was administered intravenously by anesthesia. Upper extremity fasciculation were verified followed by lower extremity fasciculation. Once fasciculations terminated, confirmation of affective neuromuscular blockade demonstrated by absence of withdrawal reflex. Bite blocks were put in place. Static impedance was tested and within appropriate limits. Thymatron settings were confirmed with nursing staff. Staff was cleared from the patient and dose of ECT stimulus was delivered. Motor seizure activity  was observed at duration noted and terminated. EEG seizure activity was observed of duration noted that was confirmed via monitoring EEG and terminated with appropriate postictal suppression noted on EEG. Static impedance and dynamic impedance were documented. Tourniquet on  lower extremity was released and recovery procedures initiated. The patient was mask ventilated during the procedure with no significant drops in oxygenation saturation and tolerated the procedure well without any notable adverse effects. Condition: The patient was in stable condition with stable vital signs and able to follow commands when moved to recovery.

## 2021-05-11 ENCOUNTER — ANESTHESIA EVENT (OUTPATIENT)
Dept: POSTOP/PACU | Age: 61
End: 2021-05-11

## 2021-05-12 ENCOUNTER — ANESTHESIA (OUTPATIENT)
Dept: POSTOP/PACU | Age: 61
End: 2021-05-12

## 2021-05-12 ENCOUNTER — HOSPITAL ENCOUNTER (OUTPATIENT)
Dept: POSTOP/PACU | Age: 61
Discharge: HOME | End: 2021-05-12
Payer: COMMERCIAL

## 2021-05-12 VITALS
SYSTOLIC BLOOD PRESSURE: 109 MMHG | OXYGEN SATURATION: 97 % | BODY MASS INDEX: 26.52 KG/M2 | WEIGHT: 165 LBS | RESPIRATION RATE: 16 BRPM | TEMPERATURE: 98.2 F | DIASTOLIC BLOOD PRESSURE: 69 MMHG | HEIGHT: 66 IN | HEART RATE: 58 BPM

## 2021-05-12 VITALS — TEMPERATURE: 96.8 F | OXYGEN SATURATION: 100 %

## 2021-05-12 PROCEDURE — 7100000001 HC PACU RECOVERY - ADDTL 15 MIN

## 2021-05-12 PROCEDURE — 2500000003 HC RX 250 WO HCPCS: Performed by: NURSE ANESTHETIST, CERTIFIED REGISTERED

## 2021-05-12 PROCEDURE — 6360000002 HC RX W HCPCS: Performed by: NURSE ANESTHETIST, CERTIFIED REGISTERED

## 2021-05-12 PROCEDURE — 2580000003 HC RX 258: Performed by: ANESTHESIOLOGY

## 2021-05-12 PROCEDURE — 90870 ELECTROCONVULSIVE THERAPY: CPT

## 2021-05-12 PROCEDURE — 7100000000 HC PACU RECOVERY - FIRST 15 MIN

## 2021-05-12 PROCEDURE — 90870 ELECTROCONVULSIVE THERAPY: CPT | Performed by: PSYCHIATRY & NEUROLOGY

## 2021-05-12 PROCEDURE — 3700000000 HC ANESTHESIA ATTENDED CARE

## 2021-05-12 RX ORDER — SODIUM CHLORIDE, SODIUM LACTATE, POTASSIUM CHLORIDE, CALCIUM CHLORIDE 600; 310; 30; 20 MG/100ML; MG/100ML; MG/100ML; MG/100ML
INJECTION, SOLUTION INTRAVENOUS CONTINUOUS
Status: DISCONTINUED | OUTPATIENT
Start: 2021-05-12 | End: 2021-05-13 | Stop reason: HOSPADM

## 2021-05-12 RX ORDER — SODIUM CHLORIDE 0.9 % (FLUSH) 0.9 %
5-40 SYRINGE (ML) INJECTION EVERY 12 HOURS SCHEDULED
Status: DISCONTINUED | OUTPATIENT
Start: 2021-05-12 | End: 2021-05-13 | Stop reason: HOSPADM

## 2021-05-12 RX ORDER — SUCCINYLCHOLINE/SOD CL,ISO/PF 200MG/10ML
SYRINGE (ML) INTRAVENOUS
Status: DISPENSED
Start: 2021-05-12 | End: 2021-05-12

## 2021-05-12 RX ORDER — SODIUM CHLORIDE 0.9 % (FLUSH) 0.9 %
5-40 SYRINGE (ML) INJECTION PRN
Status: DISCONTINUED | OUTPATIENT
Start: 2021-05-12 | End: 2021-05-13 | Stop reason: HOSPADM

## 2021-05-12 RX ORDER — SUCCINYLCHOLINE CHLORIDE 20 MG/ML
INJECTION INTRAMUSCULAR; INTRAVENOUS PRN
Status: DISCONTINUED | OUTPATIENT
Start: 2021-05-12 | End: 2021-05-12 | Stop reason: SDUPTHER

## 2021-05-12 RX ORDER — LIDOCAINE HYDROCHLORIDE 10 MG/ML
1 INJECTION, SOLUTION EPIDURAL; INFILTRATION; INTRACAUDAL; PERINEURAL
Status: ACTIVE | OUTPATIENT
Start: 2021-05-12 | End: 2021-05-12

## 2021-05-12 RX ORDER — SODIUM CHLORIDE 9 MG/ML
25 INJECTION, SOLUTION INTRAVENOUS PRN
Status: DISCONTINUED | OUTPATIENT
Start: 2021-05-12 | End: 2021-05-13 | Stop reason: HOSPADM

## 2021-05-12 RX ADMIN — SODIUM CHLORIDE, POTASSIUM CHLORIDE, SODIUM LACTATE AND CALCIUM CHLORIDE: 600; 310; 30; 20 INJECTION, SOLUTION INTRAVENOUS at 07:43

## 2021-05-12 RX ADMIN — SUCCINYLCHOLINE CHLORIDE 80 MG: 20 INJECTION, SOLUTION INTRAMUSCULAR; INTRAVENOUS at 07:58

## 2021-05-12 RX ADMIN — Medication 80 MG: at 07:58

## 2021-05-12 ASSESSMENT — PAIN - FUNCTIONAL ASSESSMENT: PAIN_FUNCTIONAL_ASSESSMENT: 0-10

## 2021-05-12 ASSESSMENT — ENCOUNTER SYMPTOMS: STRIDOR: 0

## 2021-05-12 NOTE — ANESTHESIA POSTPROCEDURE EVALUATION
POST- ANESTHESIA EVALUATION       Pt Name: Lesley Sapp  MRN: [id number]  YOB: 1960  Date of evaluation: 5/12/2021  Time:  10:44 AM      /69   Pulse 58   Temp 98.2 °F (36.8 °C)   Resp 16   Ht 5' 6\" (1.676 m)   Wt 165 lb (74.8 kg)   SpO2 97%   BMI 26.63 kg/m²      Consciousness Level  Awake  Cardiopulmonary Status  Stable  Pain Adequately Treated YES  Nausea / Vomiting  NO  Adequate Hydration  YES  Anesthesia Related Complications NONE      Electronically signed by Deb Garcia MD on 5/12/2021 at 10:44 AM       Department of Anesthesiology  Postprocedure Note    Patient: Lesley Sapp  MRN: [id number]  YOB: 1960  Date of evaluation: 5/12/2021  Time:  10:44 AM     Procedure Summary     Date: 05/12/21 Room / Location: [address] [place] [address] PACU    Anesthesia Start: 2659 Anesthesia Stop: 0808    Procedure: ECT W/ ANESTHESIA Diagnosis: Major depressive disorder, recurrent, severe with psychotic symptoms    Scheduled Providers:  Responsible Provider: Deb Garcia MD    Anesthesia Type: general ASA Status: 2          Anesthesia Type: general    Tarik Phase I: Tarik Score: 10    Tarik Phase II:      Last vitals: Reviewed and per EMR flowsheets.        Anesthesia Post Evaluation

## 2021-05-12 NOTE — ANESTHESIA PRE PROCEDURE
Department of Anesthesiology  Preprocedure Note       Name:  Oswald Harris   Age:  64 y.o.  :  1960                                          MRN:  [id number]         Date:  2021      Surgeon: Dr. Jacqualine Ormond    Procedure: ECT    Medications prior to admission:   Prior to Admission medications    Medication Sig Start Date End Date Taking?  Authorizing Provider   acetaminophen (TYLENOL) 500 MG tablet Take 500 mg by mouth every 6 hours as needed for Pain   Yes Historical Provider, MD   atorvastatin (LIPITOR) 10 MG tablet Take 2 tablets by mouth daily 21  Yes Pacheco Griffiths MD   busPIRone (BUSPAR) 15 MG tablet Take 15 mg by mouth 2 times daily 21  Yes Pacheco Griffiths MD   cloNIDine (CATAPRES) 0.1 MG tablet Take 1 tablet by mouth nightly 21  Yes Pacheco Griffiths MD   omeprazole (PRILOSEC) 20 MG delayed release capsule Take 1 capsule by mouth daily 21  Yes Pacheco Griffiths MD   sertraline (ZOLOFT) 50 MG tablet Take 3 tablets by mouth daily 21  Yes Pacheco Griffiths MD   traZODone (DESYREL) 50 MG tablet Take 1 tablet by mouth nightly as needed for Sleep 21  Yes Pacheco Griffiths MD   lithium (LITHOBID) 300 MG extended release tablet Take 1 tablet by mouth daily 21   Pacheco Griffiths MD       Current medications:    Current Outpatient Medications   Medication Sig Dispense Refill    acetaminophen (TYLENOL) 500 MG tablet Take 500 mg by mouth every 6 hours as needed for Pain      atorvastatin (LIPITOR) 10 MG tablet Take 2 tablets by mouth daily 30 tablet 0    busPIRone (BUSPAR) 15 MG tablet Take 15 mg by mouth 2 times daily 60 tablet 0    cloNIDine (CATAPRES) 0.1 MG tablet Take 1 tablet by mouth nightly 60 tablet 3    omeprazole (PRILOSEC) 20 MG delayed release capsule Take 1 capsule by mouth daily 30 capsule 3    sertraline (ZOLOFT) 50 MG tablet Take 3 tablets by mouth daily 30 tablet 3    traZODone (DESYREL) 50 MG tablet Take 1 tablet by mouth nightly as needed for Sleep 30 tablet 0  lithium (LITHOBID) 300 MG extended release tablet Take 1 tablet by mouth daily 60 tablet 3     Current Facility-Administered Medications   Medication Dose Route Frequency Provider Last Rate Last Admin    lactated ringers infusion   Intravenous Continuous Johann Hartley  mL/hr at 05/12/21 0743 New Bag at 05/12/21 0743    sodium chloride flush 0.9 % injection 5-40 mL  5-40 mL Intravenous 2 times per day Johann Hartley MD        sodium chloride flush 0.9 % injection 5-40 mL  5-40 mL Intravenous PRN Johann Hartley MD        0.9 % sodium chloride infusion  25 mL Intravenous PRN Johann Hartley MD        lidocaine PF 1 % injection 1 mL  1 mL Intradermal Once PRN Johann Hartley MD        methohexital (BREVITAL SODIUM) 100 MG/10ML injection             succinylcholine (ANECTINE) 200 MG/10ML injection                Allergies:     Allergies   Allergen Reactions    Bupropion        Problem List:    Patient Active Problem List   Diagnosis Code    Major depression, single episode F32.9    Severe recurrent major depression with psychotic features ([address] F33.3       Past Medical History:        Diagnosis Date    Anxiety     Depression     Fracture of right wrist     GERD (gastroesophageal reflux disease)     Hyperlipidemia     Left wrist fracture     CHILDHOOD    KURT (obstructive sleep apnea)     CPAP nightly    Renal insufficiency     Creatinine 1.08 on 4-6-21    S/P ECT (electroconvulsive therapy)     Severe recurrent major depression with psychotic features ([address] 04/02/2021    Short-term memory loss     Suicidal ideation        Past Surgical History:        Procedure Laterality Date    COSMETIC SURGERY      face- CHILDHOOD    EYE SURGERY Bilateral     lasik    FRACTURE SURGERY Right     wrist    OTHER SURGICAL HISTORY      ECT tx's    WISDOM TOOTH EXTRACTION         Social History:    Social History     Tobacco Use    Smoking status: Current Some Day Smoker     Types: Cigarettes HCG, HCGQUANT     ABGs: No results found for: PHART, PO2ART, NIK7FSL, GQR4WUE, BEART, [id number]     Type & Screen (If Applicable):  No results found for: LABABO, LABRH    Drug/Infectious Status (If Applicable):  No results found for: HIV, HEPCAB    COVID-19 Screening (If Applicable):   Lab Results   Component Value Date    COVID19 Not Detected 05/05/2021           Anesthesia Evaluation  Patient summary reviewed and Nursing notes reviewed no history of anesthetic complications:   Airway: Mallampati: I  TM distance: >3 FB   Neck ROM: full  Mouth opening: > = 3 FB Dental: normal exam         Pulmonary:Negative Pulmonary ROS breath sounds clear to auscultation      (-) rhonchi, wheezes, rales and stridor                           Cardiovascular:Negative CV ROS        (-) murmur,  friction rub, systolic click, carotid bruit,  JVD and peripheral edema      Rhythm: regular  Rate: normal                    Neuro/Psych:   (+) psychiatric history:            GI/Hepatic/Renal:   (+) GERD:,           Endo/Other:                     Abdominal:           Vascular: negative vascular ROS. Anesthesia Plan      general     ASA 2       Induction: intravenous. Anesthetic plan and risks discussed with patient.                       Diony Ignacio MD   5/12/2021

## 2021-05-12 NOTE — PROCEDURES
Right Unilateral ECT Procedure Report  Jorge Guadarrama   5/12/2021  1960         Attending:Duc Wagner MD  Preprocedure diagnosis: Major depressive disorder, recurrent, severe with psychotic symptoms (F33.3)  Postprocedure diagnosis: SAME AS PRE-PROCEDURE DIAGNOSIS  Treatment Number: This is treatment # 11 (2nd RUL , 9 previous BL)   Patient Status: Outpatient   Type of ECT: Ultra Brief Pulse Right Unilateral  Medications  Preprocedure: Tylenol 650mg  Anesthetic: Methohexital 80 mg  Paralytic: Succinylcholine 80 mg  Post procedure: none needed     Cuff placement: Left Lower Extremity    Thymatron Settings 1st Stimulus:     Pulse width: 0.5 ms   Frequency:  60 hz   % Power:   50 %   Static Impedance: 2450 ohms             Dynamic Impedance: 320 ohms             Motor Seizure Duration: 23 seconds  EEG Seizure Duration: 58 seconds                 The patient's ECT treatment was performed using Thymatron machine  . Timeout:  Time out was performed using two patient identifiers and confirmation of procedure. Patient Preparation: Preprocedure documentation, labs, H&P were all verified and reviewed. The patient was placed in supine position. EEG leads were placed for monitoring of seizure. Christianity areas bilaterally cleaned and prepped. Pre-Tac solution was applied over stimulus electrode site on right temporal area. Saline solution was used to moisten the scalp and hair 1 inch ipsilateral to the right side of the vertex of the skull. Thymapad's then applied to stimulus electrode site bilaterally with the center of the right temporal lead placed approximately 1 inch superior to the midpoint of line between canthus and the tragus and the center of the 2nd lead place one inch ipsilateral to the right side of the vertex of the skull. The patient was pre-oxygenated. Procedure in detail: Medications were administered by anesthesia using intravenous access at the doses listed previously in this summary. Anesthetic administered and once confirmation the patient is anesthetized, the patient's blood pressure cuff on lower extremity was inflated to 100mmHg in excess of patient's blood pressure. At this time, the neuromuscular blockade was administered intravenously by anesthesia. Upper extremity fasciculation were verified followed by lower extremity fasciculation. Once fasciculations terminated, confirmation of affective neuromuscular blockade demonstrated by absence of withdrawal reflex. Bite blocks were put in place. Static impedance was tested and within appropriate limits. Thymatron settings were confirmed with nursing staff. Staff was cleared from the patient and dose of ECT stimulus was delivered. Motor seizure activity  was observed at duration noted and terminated. EEG seizure activity was observed of duration noted that was confirmed via monitoring EEG and terminated with appropriate postictal suppression noted on EEG. Static impedance and dynamic impedance were documented. Tourniquet on  lower extremity was released and recovery procedures initiated. The patient was mask ventilated during the procedure with no significant drops in oxygenation saturation and tolerated the procedure well without any notable adverse effects. Condition: The patient was in stable condition with stable vital signs and able to follow commands when moved to recovery.

## 2021-05-12 NOTE — INTERVAL H&P NOTE
Update History & Physical    The patient's History and Physical of April 26, 2021 was reviewed with the patient and I examined the patient. I concur with findings. Changes are as mentioned below. Lesley Sapp is 64 y.o. female, here for ECT treatment. Last ECT treatment was 05/05/2021. Pt tolerated last treatment well. However, Pt reports she does have fatigue the day of ECT as well as some short term memory loss since beginning ECT treatments. Pt rating her average mood 6-7/10 with 10 being the best mood. She reports this week her mood has been down related to weather and \"family things\". Denies suicidal or homicidal ideations. She reports she has had hopelessness, helplessness, worthlessness. She did see her therapist yesterday and discussed this. She also reports her and her  had a long discussion as well. Patient's sleep has been fair. Appetite has been fair. Pt denies auditory/visual hallucinations. Pt has been taking psychiatric medications as prescribed. However, Pt reports she ran out of Lithium on Monday. She reports she is going to discuss this with Dr. Corie Donovan today. Pt does not have any other somatic complaints at this time. NPO. Took acetaminophen this am with sip of water. Denies chest pain/pressure, palpitations, SOB, N/V/D or constipation, recent URI, fever or chills.         Electronically signed by BLADIMIR Rodriguez CNP on 5/12/2021 at 6:49 AM

## 2021-05-12 NOTE — PROGRESS NOTES
Pre ECT Assessment Note  Mio Francois, [facility]  Psychiatry  5/12/2021      Red Knapp  1960  [id number]      Subjective:     Patient is a 64 y.o.  female seen for an evaluation prior to today's electroconvulsive therapy treatment. Today is treatment number 11, today we are switching to right unilateral.  This is course number 1. The patient has previously received no ECT. Camron Garcia reports that she had a stressful weekend because of being in a confined space with poor weather at the [facility]. She reports that she was overwhelmed having a puppy and her grandchildren in the camper. She states \"I did not handle it that well\". She reports that she continues to have feelings of worthlessness and feels hopeless at times however denies that it leads to suicidal ideation. She denies having any passive thoughts that she would be better off if God took her. She states that she and her  had a good conversation last night and she feels that she \"turned a corner\". She looks forward to skipping ECT next week and continues to have the intent to discontinue treatment. She reports that she has been out of her lithium since Monday and is requesting a refill. She was advised that we would send her most recent lab work as well as her discharge medication list to [facility] for refills. Patient verbalized understanding. We also again discussed her clonidine and trazodone. We again reinforced that she should be taking trazodone as needed and clonidine every night. She reports that she does sleep better on the nights that she takes the clonidine.     Screening Tools:    MADRS Score 5/12/2021 = 42, repeat score 4/15/2021 = 25, repeat 5/5/21 = 15    Patient Active Problem List    Diagnosis Date Noted    Severe recurrent major depression with psychotic features ([facility] [address] 04/02/2021    Major depression, single episode 04/01/2021     Past Medical History:   Diagnosis Date    Anxiety     Depression     Fracture of right wrist     GERD (gastroesophageal reflux disease)     Hyperlipidemia     Left wrist fracture     CHILDHOOD    KURT (obstructive sleep apnea)     CPAP nightly    Renal insufficiency     Creatinine 1.08 on 4-6-21    S/P ECT (electroconvulsive therapy)     [facility] with [facility] 04/02/2021    Short-term memory loss     Suicidal ideation       Past Surgical History:   Procedure Laterality Date    COSMETIC SURGERY      face- CHILDHOOD    EYE SURGERY Bilateral     lasik    FRACTURE SURGERY Right     wrist    OTHER SURGICAL HISTORY      ECT tx's    WISDOM TOOTH EXTRACTION        Not in a hospital admission. Allergies   Allergen Reactions    Bupropion       Social History     Tobacco Use    Smoking status: Current Some Day Smoker     Types: Cigarettes    Smokeless tobacco: Never Used    Tobacco comment: SMOKES 3 CIGARETTES OR SO A DAY, BUT NOT EVERY DAY   Substance Use Topics    Alcohol use: Yes     Comment: occas wine      Family History   Problem Relation Age of Onset    Bipolar Disorder Daughter         X4 DAUGHTERS, SOME HAVE BIPOLAR/ANXIETY/ADHD    Anxiety Disorder Daughter     ADHD Daughter           Review Of Systems:       Psychiatric Review Of Systems:  Depression continues to improve, low motivation lingering. Increased anxiety and stress over the past weekend though coping well today. No A/V hallucinations. No suicidal ideation, intent or plan.     Objective:       Mental Status Evaluation:  Appearance:  age appropriate and casually dressed   Behavior:   Calm, cooperative   Speech:  normal pitch, normal volume and soft   Mood:   \"Still down\"   Affect:  mood-congruent   Thought Process:  goal directed   Thought Content:   Denies suicidal ideation    Sensorium:  person, place, time/date and situation   Cognition:   Impaired   Insight:  good   Judgment:  good     Assessment:     Diagnosis: Major depressive disorder, recurrent, severe

## 2021-05-17 DIAGNOSIS — Z01.818 PREOPERATIVE CLEARANCE: Primary | ICD-10-CM

## 2021-05-18 DIAGNOSIS — F33.3 SEVERE RECURRENT MAJOR DEPRESSION WITH PSYCHOTIC FEATURES (HCC): Primary | ICD-10-CM

## 2021-05-24 LAB — SARS-COV-2: NORMAL

## 2021-05-25 ENCOUNTER — ANESTHESIA EVENT (OUTPATIENT)
Dept: POSTOP/PACU | Age: 61
End: 2021-05-25

## 2021-05-26 ENCOUNTER — ANESTHESIA (OUTPATIENT)
Dept: POSTOP/PACU | Age: 61
End: 2021-05-26

## 2021-05-26 ENCOUNTER — HOSPITAL ENCOUNTER (OUTPATIENT)
Dept: POSTOP/PACU | Age: 61
Discharge: HOME | End: 2021-05-26
Payer: COMMERCIAL

## 2021-05-26 VITALS
OXYGEN SATURATION: 94 % | HEIGHT: 66 IN | SYSTOLIC BLOOD PRESSURE: 116 MMHG | HEART RATE: 54 BPM | BODY MASS INDEX: 26.52 KG/M2 | DIASTOLIC BLOOD PRESSURE: 68 MMHG | TEMPERATURE: 98.2 F | WEIGHT: 165 LBS | RESPIRATION RATE: 17 BRPM

## 2021-05-26 VITALS — OXYGEN SATURATION: 99 %

## 2021-05-26 PROCEDURE — 90870 ELECTROCONVULSIVE THERAPY: CPT | Performed by: PSYCHIATRY & NEUROLOGY

## 2021-05-26 PROCEDURE — 3700000000 HC ANESTHESIA ATTENDED CARE

## 2021-05-26 PROCEDURE — 90870 ELECTROCONVULSIVE THERAPY: CPT

## 2021-05-26 PROCEDURE — 6360000002 HC RX W HCPCS: Performed by: NURSE ANESTHETIST, CERTIFIED REGISTERED

## 2021-05-26 PROCEDURE — 7100000001 HC PACU RECOVERY - ADDTL 15 MIN

## 2021-05-26 PROCEDURE — 7100000000 HC PACU RECOVERY - FIRST 15 MIN

## 2021-05-26 PROCEDURE — 3700000001 HC ADD 15 MINUTES (ANESTHESIA)

## 2021-05-26 PROCEDURE — 2500000003 HC RX 250 WO HCPCS: Performed by: NURSE ANESTHETIST, CERTIFIED REGISTERED

## 2021-05-26 PROCEDURE — 2580000003 HC RX 258: Performed by: ANESTHESIOLOGY

## 2021-05-26 RX ORDER — OXYCODONE HYDROCHLORIDE AND ACETAMINOPHEN 5; 325 MG/1; MG/1
1 TABLET ORAL PRN
Status: ACTIVE | OUTPATIENT
Start: 2021-05-26 | End: 2021-05-26

## 2021-05-26 RX ORDER — LIDOCAINE HYDROCHLORIDE 10 MG/ML
1 INJECTION, SOLUTION EPIDURAL; INFILTRATION; INTRACAUDAL; PERINEURAL
Status: ACTIVE | OUTPATIENT
Start: 2021-05-26 | End: 2021-05-26

## 2021-05-26 RX ORDER — SODIUM CHLORIDE 0.9 % (FLUSH) 0.9 %
10 SYRINGE (ML) INJECTION PRN
Status: DISCONTINUED | OUTPATIENT
Start: 2021-05-26 | End: 2021-05-27 | Stop reason: HOSPADM

## 2021-05-26 RX ORDER — LABETALOL HYDROCHLORIDE 5 MG/ML
5 INJECTION, SOLUTION INTRAVENOUS EVERY 10 MIN PRN
Status: DISCONTINUED | OUTPATIENT
Start: 2021-05-26 | End: 2021-05-27 | Stop reason: HOSPADM

## 2021-05-26 RX ORDER — SUCCINYLCHOLINE CHLORIDE 20 MG/ML
INJECTION INTRAMUSCULAR; INTRAVENOUS PRN
Status: DISCONTINUED | OUTPATIENT
Start: 2021-05-26 | End: 2021-05-26 | Stop reason: SDUPTHER

## 2021-05-26 RX ORDER — DIPHENHYDRAMINE HYDROCHLORIDE 50 MG/ML
12.5 INJECTION INTRAMUSCULAR; INTRAVENOUS
Status: ACTIVE | OUTPATIENT
Start: 2021-05-26 | End: 2021-05-26

## 2021-05-26 RX ORDER — ONDANSETRON 2 MG/ML
4 INJECTION INTRAMUSCULAR; INTRAVENOUS
Status: ACTIVE | OUTPATIENT
Start: 2021-05-26 | End: 2021-05-26

## 2021-05-26 RX ORDER — OXYCODONE HYDROCHLORIDE AND ACETAMINOPHEN 5; 325 MG/1; MG/1
2 TABLET ORAL PRN
Status: ACTIVE | OUTPATIENT
Start: 2021-05-26 | End: 2021-05-26

## 2021-05-26 RX ORDER — SODIUM CHLORIDE 9 MG/ML
25 INJECTION, SOLUTION INTRAVENOUS PRN
Status: DISCONTINUED | OUTPATIENT
Start: 2021-05-26 | End: 2021-05-27 | Stop reason: HOSPADM

## 2021-05-26 RX ORDER — SODIUM CHLORIDE, SODIUM LACTATE, POTASSIUM CHLORIDE, CALCIUM CHLORIDE 600; 310; 30; 20 MG/100ML; MG/100ML; MG/100ML; MG/100ML
INJECTION, SOLUTION INTRAVENOUS CONTINUOUS
Status: DISCONTINUED | OUTPATIENT
Start: 2021-05-26 | End: 2021-05-27 | Stop reason: HOSPADM

## 2021-05-26 RX ORDER — SUCCINYLCHOLINE/SOD CL,ISO/PF 200MG/10ML
SYRINGE (ML) INTRAVENOUS
Status: DISPENSED
Start: 2021-05-26 | End: 2021-05-26

## 2021-05-26 RX ORDER — SODIUM CHLORIDE 0.9 % (FLUSH) 0.9 %
10 SYRINGE (ML) INJECTION EVERY 12 HOURS SCHEDULED
Status: DISCONTINUED | OUTPATIENT
Start: 2021-05-26 | End: 2021-05-27 | Stop reason: HOSPADM

## 2021-05-26 RX ADMIN — Medication 90 MG: at 08:00

## 2021-05-26 RX ADMIN — SUCCINYLCHOLINE CHLORIDE 90 MG: 20 INJECTION, SOLUTION INTRAMUSCULAR; INTRAVENOUS at 08:00

## 2021-05-26 RX ADMIN — SODIUM CHLORIDE, POTASSIUM CHLORIDE, SODIUM LACTATE AND CALCIUM CHLORIDE: 600; 310; 30; 20 INJECTION, SOLUTION INTRAVENOUS at 07:53

## 2021-05-26 ASSESSMENT — LIFESTYLE VARIABLES: SMOKING_STATUS: 1

## 2021-05-26 ASSESSMENT — PAIN SCALES - GENERAL: PAINLEVEL_OUTOF10: 0

## 2021-05-26 NOTE — PROGRESS NOTES
Pre ECT Assessment Note  Psychiatry  5/26/2021      [facility]  1960  [id number]      Subjective:     Patient is a 64 y.o.  female seen for an evaluation prior to today's electroconvulsive therapy treatment. Today is treatment number 12. Today we are utilizing right unilateral for the third time. This is course number 1. The patient has previously received no ECT. Patient is evaluated face-to-face. She appears well groomed. She is anxious. States that she is experiencing cognitive side effects to ECT treatment and is unsure if she would like to continue with treatment. Patient spoke with Dr. Shane Montoya regarding this concern. They have scheduled an appointment to discuss treatment next week. Patient is not accompanied by anyone today. She states that her mood is okay but she continues to have feelings of hopelessness and worthlessness. Denies suicidal ideation. Denies having thoughts that life is not worth living. She is able to contract for safety. States that her sleep has been improved with compliance with her medication regimen. Reviewed patient's labs from recent ECT treatment on 5/5/2021. Patient was reporting increased shaking and had concerns that she may be having side effects to her home medication lithium. Lithium level is 0.3. All lab values appear appropriate. No significant concerns.     Screening Tools:    MADRS Score original = 42, repeat score 4/15/2021 = 25, repeat 5/5/21 = 15    Patient Active Problem List    Diagnosis Date Noted    Severe recurrent major depression with psychotic features ([facility] [address] 04/02/2021    Major depression, single episode 04/01/2021     Past Medical History:   Diagnosis Date    Anxiety     Depression     Fracture of right wrist     GERD (gastroesophageal reflux disease)     Hyperlipidemia     Left wrist fracture     CHILDHOOD    Memory loss     Short and long term    KURT (obstructive sleep apnea)     CPAP nightly    Renal insufficiency     Creatinine 1.08 on 4-6-21    S/P ECT (electroconvulsive therapy)     Severe recurrent major depression with psychotic features ([facility] [address] 04/02/2021    Suicidal ideation     Visual hallucinations       Past Surgical History:   Procedure Laterality Date    COSMETIC SURGERY      face- CHILDHOOD    EYE SURGERY Bilateral     lasik    FRACTURE SURGERY Right     wrist    OTHER SURGICAL HISTORY      ECT tx's    WISDOM TOOTH EXTRACTION        Not in a hospital admission. Allergies   Allergen Reactions    Bupropion       Social History     Tobacco Use    Smoking status: Current Some Day Smoker     Types: Cigarettes    Smokeless tobacco: Never Used    Tobacco comment: SMOKES 3 CIGARETTES OR SO A DAY, BUT NOT EVERY DAY- does vape   Substance Use Topics    Alcohol use: Yes     Comment: occas wine      Family History   Problem Relation Age of Onset    Bipolar Disorder Daughter         X4 DAUGHTERS, SOME HAVE BIPOLAR/ANXIETY/ADHD    Anxiety Disorder Daughter     ADHD Daughter           Review Of Systems:       Psychiatric Review Of Systems:  Depression continues to improve, low motivation lingering. More emotional over the last weekend, but engaging well with family. Some anxiety. No A/V hallucinations. No suicidal ideation, intent or plan. Objective:       Mental Status Evaluation:  Appearance:  age appropriate and well groomed   Behavior:   Calm, cooperative   Speech:  normal pitch, normal volume and soft   Mood:   Anxious   Affect:  mood-congruent   Thought Process:  goal directed   Thought Content:  suicidal ideation improved   Sensorium:  person, place, time/date and situation   Cognition:   Impaired   Insight:  good   Judgment:  good     Assessment:     Diagnosis: Major depressive disorder, recurrent, severe with psychotic symptoms    Plan:     Continue right unilateral, transition to biweekly treatments. Plan to speak with Dr. Vivi Martin next Tuesday to discuss treatment.     Update consent, labs and H&P every 30 days while undergoing ECT treatment. Patient verbalizes understanding of risks/benefits/alternatives to ECT. Last informed consent signed on 5/5/2021.

## 2021-05-26 NOTE — INTERVAL H&P NOTE
98 05/05/2021    CALCIUM 8.6 05/05/2021    PROT 6.4 04/05/2021    LABALBU 4.3 04/05/2021    BILITOT 0.29 (L) 04/05/2021    ALKPHOS 58 04/05/2021    AST 11 04/05/2021    ALT 15 04/05/2021    LABGLOM >60 05/05/2021    GFRAA >60 05/05/2021       Lab Results   Component Value Date    WBC 5.9 04/05/2021    HGB 13.9 04/05/2021    HCT 41.6 04/05/2021    MCV 92.3 04/05/2021     04/05/2021       Surgical site was confirmed per myself and the patient.   Electronically signed by BLADIMIR Luna CNP on 5/26/2021 at 6:50 AM

## 2021-05-26 NOTE — ANESTHESIA POSTPROCEDURE EVALUATION
Department of Anesthesiology  Postprocedure Note    Patient: Glenn Roy  MRN: [id number]  YOB: 1960  Date of evaluation: 5/26/2021  Time:  1:34 PM     Procedure Summary     Date: 05/26/21 Room / Location: [address] [place] [address] PACU    Anesthesia Start: 9911 Anesthesia Stop: 0813    Procedure: ECT W/ ANESTHESIA Diagnosis:     Scheduled Providers:  Responsible Provider: Francis Max MD    Anesthesia Type: MAC ASA Status: 3          Anesthesia Type: MAC    Tarik Phase I: Tarik Score: 10    Tarik Phase II:      Last vitals: Reviewed and per EMR flowsheets.        Anesthesia Post Evaluation    Comments: POST- ANESTHESIA EVALUATION       Pt Name: Glenn Roy  MRN: [id number]  YOB: 1960  Date of evaluation: 5/26/2021  Time:  1:34 PM      /68   Pulse 54   Temp 98.2 °F (36.8 °C)   Resp 17   Ht 5' 6\" (1.676 m)   Wt 165 lb (74.8 kg)   SpO2 94%   BMI 26.63 kg/m²      Consciousness Level  Awake  Cardiopulmonary Status  Stable  Pain Adequately Treated YES  Nausea / Vomiting  NO  Adequate Hydration  YES  Anesthesia Related Complications NONE      Electronically signed by Francis Max MD on 5/26/2021 at 1:34 PM

## 2021-05-26 NOTE — PROGRESS NOTES
BP CUFF DEFLATED LEFT ANKLE    PULSE WIDTH- 0.5  FREQUENCY- 60  DURATION % POWER- 50  CURRENT- 0.9  EEG- 54  MOTOR- 26  STATIC- 2240  DYNAMIC- 290

## 2021-05-26 NOTE — PROCEDURES
Right Unilateral ECT Procedure Report  Manny Villa   5/26/2021  1960         Attending:Duc Wagner MD  Preprocedure diagnosis: Major depressive disorder, recurrent, severe with psychotic symptoms (F33.3)  Postprocedure diagnosis: SAME AS PRE-PROCEDURE DIAGNOSIS  Treatment Number: This is treatment # 12 (3rd RUL , 9 previous BL)   Patient Status: Outpatient   Type of ECT: Ultra Brief Pulse Right Unilateral  Medications  Preprocedure: Tylenol 650mg  Anesthetic: Methohexital 80 mg  Paralytic: Succinylcholine 80 mg  Post procedure: none needed     Cuff placement: Left Lower Extremity    Thymatron Settings 1st Stimulus:     Pulse width: 0.5 ms   Frequency:  60 hz   % Power:   50 %   Static Impedance: 2240 ohms             Dynamic Impedance: 290 ohms             Motor Seizure Duration: 26 seconds  EEG Seizure Duration: 54 seconds                 The patient's ECT treatment was performed using Thymatron machine  . Timeout:  Time out was performed using two patient identifiers and confirmation of procedure. Patient Preparation: Preprocedure documentation, labs, H&P were all verified and reviewed. The patient was placed in supine position. EEG leads were placed for monitoring of seizure. Buddhist areas bilaterally cleaned and prepped. Pre-Tac solution was applied over stimulus electrode site on right temporal area. Saline solution was used to moisten the scalp and hair 1 inch ipsilateral to the right side of the vertex of the skull. Thymapad's then applied to stimulus electrode site bilaterally with the center of the right temporal lead placed approximately 1 inch superior to the midpoint of line between canthus and the tragus and the center of the 2nd lead place one inch ipsilateral to the right side of the vertex of the skull. The patient was pre-oxygenated. Procedure in detail: Medications were administered by anesthesia using intravenous access at the doses listed previously in this summary.

## 2021-05-26 NOTE — ANESTHESIA PRE PROCEDURE
(DESYREL) 50 MG tablet Take 1 tablet by mouth nightly as needed for Sleep 30 tablet 0     No current facility-administered medications for this visit. Facility-Administered Medications Ordered in Other Visits   Medication Dose Route Frequency Provider Last Rate Last Admin    lactated ringers infusion   Intravenous Continuous Lauren Valenzuela MD        sodium chloride flush 0.9 % injection 10 mL  10 mL Intravenous 2 times per day Lauren Valenzuela MD        sodium chloride flush 0.9 % injection 10 mL  10 mL Intravenous PRN Lauren Valenzuela MD        0.9 % sodium chloride infusion  25 mL Intravenous PRN Lauren Valenzuela MD        lidocaine PF 1 % injection 1 mL  1 mL Intradermal Once PRN Lauren Valenzuela MD        methohexital (BREVITAL SODIUM) 100 MG/10ML injection             succinylcholine (ANECTINE) 200 MG/10ML injection                Allergies:     Allergies   Allergen Reactions    Bupropion        Problem List:    Patient Active Problem List   Diagnosis Code    Major depression, single episode F32.9    Severe recurrent major depression with psychotic features ([address] F33.3       Past Medical History:        Diagnosis Date    Anxiety     Depression     Fracture of right wrist     GERD (gastroesophageal reflux disease)     Hyperlipidemia     Left wrist fracture     CHILDHOOD    Memory loss     Short and long term    KURT (obstructive sleep apnea)     CPAP nightly    Renal insufficiency     Creatinine 1.08 on 4-6-21    S/P ECT (electroconvulsive therapy)     Severe recurrent major depression with psychotic features ([address] 04/02/2021    Suicidal ideation     Visual hallucinations        Past Surgical History:        Procedure Laterality Date    COSMETIC SURGERY      face- CHILDHOOD    EYE SURGERY Bilateral     lasik    FRACTURE SURGERY Right     wrist    OTHER SURGICAL HISTORY      ECT tx's    WISDOM TOOTH EXTRACTION         Social History:    Social History     Tobacco Use    Smoking status: Current Some Day Smoker     Types: Cigarettes    Smokeless tobacco: Never Used    Tobacco comment: SMOKES 3 CIGARETTES OR SO A DAY, BUT NOT EVERY DAY- does vape   Substance Use Topics    Alcohol use: Yes     Comment: occas wine                                Ready to quit: Not Answered  Counseling given: Not Answered  Comment: SMOKES 3 CIGARETTES OR SO A DAY, BUT NOT EVERY DAY- does vape      Vital Signs (Current): There were no vitals filed for this visit. BP Readings from Last 3 Encounters:   05/26/21 (!) 107/52   05/12/21 109/69   05/05/21 133/76       NPO Status:                                                                                 BMI:   Wt Readings from Last 3 Encounters:   05/26/21 165 lb (74.8 kg)   05/12/21 165 lb (74.8 kg)   05/03/21 165 lb (74.8 kg)     There is no height or weight on file to calculate BMI.    CBC:   Lab Results   Component Value Date    WBC 5.9 04/05/2021    RBC 4.51 04/05/2021    HGB 13.9 04/05/2021    HCT 41.6 04/05/2021    MCV 92.3 04/05/2021    RDW 12.9 04/05/2021     04/05/2021       CMP:   Lab Results   Component Value Date     05/05/2021    K 4.4 05/05/2021     05/05/2021    CO2 23 05/05/2021    BUN 14 05/05/2021    CREATININE 0.90 05/05/2021    GFRAA >60 05/05/2021    LABGLOM >60 05/05/2021    GLUCOSE 98 05/05/2021    PROT 6.4 04/05/2021    CALCIUM 8.6 05/05/2021    BILITOT 0.29 04/05/2021    ALKPHOS 58 04/05/2021    AST 11 04/05/2021    ALT 15 04/05/2021       POC Tests: No results for input(s): POCGLU, POCNA, POCK, POCCL, POCBUN, POCHEMO, POCHCT in the last 72 hours.     Coags: No results found for: PROTIME, INR, APTT    HCG (If Applicable): No results found for: PREGTESTUR, PREGSERUM, HCG, HCGQUANT     ABGs: No results found for: PHART, PO2ART, PIF8GGY, CDY8JSL, BEART, P6IQWHHA     Type & Screen (If Applicable):  No results found for: LABABO, LABRH    Drug/Infectious Status (If Applicable):  No results found for: HIV, HEPCAB    COVID-19 Screening (If Applicable):   Lab Results   Component Value Date    COVID19 Not Detected 05/05/2021           Anesthesia Evaluation  Patient summary reviewed and Nursing notes reviewed no history of anesthetic complications:   Airway: Mallampati: II  TM distance: >3 FB   Neck ROM: full  Mouth opening: > = 3 FB Dental: normal exam         Pulmonary:normal exam  breath sounds clear to auscultation  (+) sleep apnea:  current smoker                           Cardiovascular:    (+) hyperlipidemia        Rhythm: regular  Rate: normal                    Neuro/Psych:   (+) psychiatric history:            GI/Hepatic/Renal:   (+) GERD:, renal disease: CRI,           Endo/Other: Negative Endo/Other ROS                    Abdominal:           Vascular:                                          Anesthesia Plan      MAC     ASA 3       Induction: intravenous. MIPS: Prophylactic antiemetics administered. Anesthetic plan and risks discussed with patient. Plan discussed with CRNA.                   Néstor Marie MD   5/26/2021

## 2021-06-01 ENCOUNTER — HOSPITAL ENCOUNTER (OUTPATIENT)
Dept: PSYCHIATRY | Age: 61
Setting detail: THERAPIES SERIES
Discharge: HOME | End: 2021-06-01
Payer: COMMERCIAL

## 2021-06-01 DIAGNOSIS — F33.3 SEVERE RECURRENT MAJOR DEPRESSION WITH PSYCHOTIC FEATURES (HCC): Primary | ICD-10-CM

## 2021-06-01 PROCEDURE — 99214 OFFICE O/P EST MOD 30 MIN: CPT | Performed by: PSYCHIATRY & NEUROLOGY

## 2021-06-02 NOTE — PROGRESS NOTES
another outpatient provider and we did discuss the possibility of Dr. Honorio Shi at [facility]. This benefits and alternatives discussed with the patient to cessation of ECT. We did discuss potential relapse and warning signs. Patient encouraged to call at any time. Patient opting to follow-up in another 6 to 8 weeks to touch base again before terminating care with this provider. Patient was reporting some jitteriness in her fingers. She was pondering whether this was a side effect to ECT. I notified her that I did not believe this was from ECT but more likely from her lithium. We did see a lithium level in May that was 0.3. Patient following up with her outpatient provider with regards to lithium no other side effects to medications noted    Mental Status:  Patient is alert and oriented to time , place , person and self. She  is cooperative with fair eye contact and fair grooming . Appears of stated age . Mood is 'pretty good'. Affect is congruent . Thought process is linear and goal directed . Thought content is negative for  Suicidal or homicidal thoughts . Denies any hallucinations or delusions . Insight is fair . Judgement is fair. Memory : Intact for conversation  Attention : No deficits noted in conversation  Intellect :  average per vocabulary and fund of knowledge . Psychomotor activity : normal.    No abnormal movements noted . Speech is regular rate and rhythm. Review of Systems:  Review of Systems   Constitutional: Negative. HENT: Negative. Eyes: Negative. Respiratory: Negative. Cardiovascular: Negative. Gastrointestinal: Negative. Genitourinary: Negative . Musculoskeletal: Negative. Skin: Negative. Neurological: Negative. Endo/Heme/Allergies: Negative.      Labs -  No results found for: LABA1C  No results found for: EAG   Lab Results   Component Value Date    WBC 5.9 04/05/2021    HGB 13.9 04/05/2021    HCT 41.6 04/05/2021    MCV 92.3 04/05/2021     04/05/2021      Lab Results   Component Value Date     05/05/2021    K 4.4 05/05/2021     05/05/2021    CO2 23 05/05/2021    BUN 14 05/05/2021    CREATININE 0.90 05/05/2021    GLUCOSE 98 05/05/2021    CALCIUM 8.6 05/05/2021       No results found for: TSH, T0CIPJA, H6OXQID, THYROIDAB, FT3, T4FREE   No results found for: CHOL  No results found for: TRIG  No results found for: HDL  No results found for: LDLCHOLESTEROL, LDLCALC  No results found for: LABVLDL, VLDL  No results found for: [facility]   Lab Results   Component Value Date    LITHIUM 0.3 (L) 05/05/2021     05/05/2021    BUN 14 05/05/2021    CREATININE 0.90 05/05/2021    WBC 5.9 04/05/2021      No results found for: PHENYTOIN, PHENOBARB, VALPROATE, CBMZ       Vital Signs    There were no vitals taken for this visit. Current Medications-   Current Outpatient Medications   Medication Sig Dispense Refill    acetaminophen (TYLENOL) 500 MG tablet Take 500 mg by mouth every 6 hours as needed for Pain      atorvastatin (LIPITOR) 10 MG tablet Take 2 tablets by mouth daily 30 tablet 0    busPIRone (BUSPAR) 15 MG tablet Take 15 mg by mouth 2 times daily 60 tablet 0    cloNIDine (CATAPRES) 0.1 MG tablet Take 1 tablet by mouth nightly 60 tablet 3    lithium (LITHOBID) 300 MG extended release tablet Take 1 tablet by mouth daily 60 tablet 3    omeprazole (PRILOSEC) 20 MG delayed release capsule Take 1 capsule by mouth daily 30 capsule 3    sertraline (ZOLOFT) 50 MG tablet Take 3 tablets by mouth daily 30 tablet 3    traZODone (DESYREL) 50 MG tablet Take 1 tablet by mouth nightly as needed for Sleep 30 tablet 0     No current facility-administered medications for this encounter. Medication reconciliation - done        Impression      1. Severe recurrent major depression with psychotic features [facility]               PLAN  Patient currently doing very well. No suicidal ideations. No psychotic symptoms.   Patient trying to establish with new outpatient psychiatrist.  At present time wishes to cease ECT treatments. Risk benefits and alternatives were discussed. Patient can call back at any time if her mood is worsening. We will follow up in 6 to 8 weeks to reassess patient progress.

## 2021-07-27 ENCOUNTER — HOSPITAL ENCOUNTER (OUTPATIENT)
Dept: PSYCHIATRY | Age: 61
Setting detail: THERAPIES SERIES
Discharge: HOME | End: 2021-07-27
Payer: COMMERCIAL

## 2021-07-27 DIAGNOSIS — F33.41 RECURRENT MAJOR DEPRESSIVE DISORDER, IN PARTIAL REMISSION (HCC): Primary | ICD-10-CM

## 2021-07-27 PROCEDURE — 99214 OFFICE O/P EST MOD 30 MIN: CPT | Performed by: PSYCHIATRY & NEUROLOGY

## 2021-07-27 NOTE — PROGRESS NOTES
Psychiatry Progress  Note     Annie Butler  64 y.o.  female    Patient's chart reviewed . Patient seen today as follow-up to 12 ECT treatments, the last 3 of which were right unilateral ECT treatments. Patient reports her depression is substantially improved and maintaining relatively well. She presently rates her depression as a 3 out of 10 intensity on a scale of 1-10 where 10 is the highest level of depression and 1 is no depression at all. She denies any suicidal ideation intent or plan. She is following up with Dr. Adriana Mercer at [facility]. She reports that she has memory loss from the period of time that she was receiving ECT treatments but has established that she is able to make new memories moving forward. She is engaging in life and camping and spending time with her family and feels that she is able to experience rashel and pleasure. At present time we discussed the possibility of terminating any follow-up versus touching base in 2 to 3 months again. Patient feels more comfortable with touching base in 2 to 3 months follow-up just to make sure things continue to go well. We also discussed the possibility in the future if ECT was required of starting with right unilateral to minimize cognitive side effects as it appears she did relatively well with her last 3 treatments at right unilateral.  As such patient was encouraged not to wait if things started to deteriorate with her mood until she was suicidal and needed hospitalization but to reach out sooner in order to head off any hospitalization. Patient expressed understanding. Patient reporting that she has had no psychotic symptoms since completing ECT. Mental Status:  Patient is alert and oriented to time , place , person and self. She  is cooperative with fair eye contact and fair grooming . Appears of stated age . Mood is 'pretty good'  . Affect is congruent . Thought process is linear and goal directed .    Thought content is negative for  Suicidal or homicidal thoughts . Denies any hallucinations or delusions . Insight is fair . Judgement is fair. Memory : immediate and after 5 min recall is 3/3 . Long term memory is impaired from time during ECT treatments. Attention : could spell 'chair ' forwards and backwards . Intellect :  average per vocabulary and fund of knowledge . Psychomotor activity : normal.    No abnormal movements noted . Speech is regular rate and rhythm. Review of Systems:  Review of Systems   Constitutional: Negative. HENT: Negative. Eyes: Negative. Respiratory: Negative. Cardiovascular: Negative. Gastrointestinal: Negative. Genitourinary: Negative . Musculoskeletal: Negative. Skin: Negative. Neurological: Negative. Endo/Heme/Allergies: Negative. Labs -  No results found for: LABA1C  No results found for: EAG   Lab Results   Component Value Date    WBC 5.9 04/05/2021    HGB 13.9 04/05/2021    HCT 41.6 04/05/2021    MCV 92.3 04/05/2021     04/05/2021      Lab Results   Component Value Date     05/05/2021    K 4.4 05/05/2021     05/05/2021    CO2 23 05/05/2021    BUN 14 05/05/2021    CREATININE 0.90 05/05/2021    GLUCOSE 98 05/05/2021    CALCIUM 8.6 05/05/2021       No results found for: TSH, Y7EOBCZ, C7HPAML, THYROIDAB, FT3, T4FREE   No results found for: CHOL  No results found for: TRIG  No results found for: HDL  No results found for: LDLCHOLESTEROL, LDLCALC  No results found for: LABVLDL, VLDL  No results found for: [facility]   Lab Results   Component Value Date    LITHIUM 0.3 (L) 05/05/2021     05/05/2021    BUN 14 05/05/2021    CREATININE 0.90 05/05/2021    WBC 5.9 04/05/2021      No results found for: PHENYTOIN, PHENOBARB, VALPROATE, CBMZ       Vital Signs    There were no vitals taken for this visit.        Current Medications-   Current Outpatient Medications   Medication Sig Dispense Refill    acetaminophen (TYLENOL) 500 MG tablet Take 500 mg by mouth every 6 hours as needed for Pain      atorvastatin (LIPITOR) 10 MG tablet Take 2 tablets by mouth daily 30 tablet 0    busPIRone (BUSPAR) 15 MG tablet Take 15 mg by mouth 2 times daily 60 tablet 0    cloNIDine (CATAPRES) 0.1 MG tablet Take 1 tablet by mouth nightly 60 tablet 3    lithium (LITHOBID) 300 MG extended release tablet Take 1 tablet by mouth daily 60 tablet 3    omeprazole (PRILOSEC) 20 MG delayed release capsule Take 1 capsule by mouth daily 30 capsule 3    sertraline (ZOLOFT) 50 MG tablet Take 3 tablets by mouth daily 30 tablet 3    traZODone (DESYREL) 50 MG tablet Take 1 tablet by mouth nightly as needed for Sleep 30 tablet 0     No current facility-administered medications for this encounter. Medication reconciliation - done        Impression      1. Recurrent major depressive disorder, in partial remission [facility])               PLAN  Patient to continue to follow-up with Dr. Maria Eugenia Pappas. Follow-up in 2 to 3 months with this provider. Patient encouraged to follow-up at any time should depression be worsening or suicidal ideations developing in order to evaluate reentry into ECT treatments if needed. Patient encouraged to follow-up with her outpatient prescribing provider, Dr. Brinda Baumgarten races for stop but not to hesitate to reach out.

## 2021-10-27 ENCOUNTER — HOSPITAL ENCOUNTER (OUTPATIENT)
Dept: PSYCHIATRY | Age: 61
Setting detail: THERAPIES SERIES
Discharge: HOME | End: 2021-10-27
Payer: COMMERCIAL

## 2021-10-27 DIAGNOSIS — F33.1 MODERATE RECURRENT MAJOR DEPRESSION (HCC): Primary | ICD-10-CM

## 2021-10-27 PROCEDURE — 99213 OFFICE O/P EST LOW 20 MIN: CPT | Performed by: PSYCHIATRY & NEUROLOGY

## 2021-10-27 NOTE — PROGRESS NOTES
Psychiatry Progress  Note     Johnnie Olvera  63 y.o.  female    10/27/2021    TELEHEALTH EVALUATION -- [facility]Visual [facility]    HPI:    Johnnie Olvera (:  1960) has requested an audio/video evaluation for the following concern(s):    Reduce exposure to COVID-19    Johnnie Olvera, was evaluated through a synchronous (real-time) audio-video encounter. The patient (or guardian if applicable) is aware that this is a billable service. Verbal consent to proceed has been obtained within the past 12 months. The visit was conducted pursuant to the emergency declaration under the [address] [place] [address] authority and the [employer] Act. Patient identification was verified, and a caregiver was present when appropriate. The patient was located in a state where the provider was credentialed to provide care. Total time spent on this encounter: Not billed by time    --Sloane Estes MD on 10/27/2021 at 4:30 PM    An electronic signature was used to authenticate this note. Patient is seen today for follow-up of ECT. Last follow-up approximately 2 to 3 months ago. Last ECT treatment in May. Patient reports she has been doing relatively well. She does feel that mood has slipped back a little bit but cites significant family stressors, needs to babysit her grandchildren, dogs that, increased demands on her time is a mild trigger. However she reports that relative to her pre-ECT status she remains free of suicidal ideation and significantly improved status post ECT. She is following up with Dr. Matt Maynard. She is denying any safety concerns whatsoever. We discussed again that we could follow-up again to assess versus having her call if needed. She states that this time she is feeling comfortable with calling us if needed.     Mental Status:  Patient is alert and oriented to time , place , person and self. She  is cooperative with fair eye contact and fair grooming . Appears of stated age . Mood is ' ok'  . Affect is congruent . Thought process is linear and goal directed . Thought content is negative for  Suicidal or homicidal thoughts . Denies any hallucinations or delusions . Insight is fair . Judgement is fair. Memory : immediate and after 5 min recall is 3/3 . Long term memory is intact . Attention : could spell 'chair ' forwards and backwards . Intellect :  average per vocabulary and fund of knowledge . Psychomotor activity : normal.    No abnormal movements noted . Speech is regular rate and rhyth no worries m. Review of Systems:  Review of Systems   Constitutional: Negative. HENT: Negative. Eyes: Negative. Respiratory: Negative. Cardiovascular: Negative. Gastrointestinal: Negative. Genitourinary: Negative . Musculoskeletal: Negative. Skin: Negative. Neurological: Negative. Endo/Heme/Allergies: Negative.      Labs -  No results found for: LABA1C  No results found for: EAG   Lab Results   Component Value Date    WBC 5.9 04/05/2021    HGB 13.9 04/05/2021    HCT 41.6 04/05/2021    MCV 92.3 04/05/2021     04/05/2021      Lab Results   Component Value Date     05/05/2021    K 4.4 05/05/2021     05/05/2021    CO2 23 05/05/2021    BUN 14 05/05/2021    CREATININE 0.90 05/05/2021    GLUCOSE 98 05/05/2021    CALCIUM 8.6 05/05/2021       No results found for: TSH, [id number], X9SQFWT, THYROIDAB, FT3, T4FREE   No results found for: CHOL  No results found for: TRIG  No results found for: HDL  No results found for: LDLCHOLESTEROL, LDLCALC  No results found for: LABVLDL, VLDL  No results found for: [facility]   Lab Results   Component Value Date    LITHIUM 0.3 (L) 05/05/2021     05/05/2021    BUN 14 05/05/2021    CREATININE 0.90 05/05/2021    WBC 5.9 04/05/2021      No results found for: PHENYTOIN, PHENOBARB, VALPROATE, CBMZ       Vital Signs    There were no vitals taken for this visit. Current Medications-   Current Outpatient Medications   Medication Sig Dispense Refill    acetaminophen (TYLENOL) 500 MG tablet Take 500 mg by mouth every 6 hours as needed for Pain      atorvastatin (LIPITOR) 10 MG tablet Take 2 tablets by mouth daily 30 tablet 0    busPIRone (BUSPAR) 15 MG tablet Take 15 mg by mouth 2 times daily 60 tablet 0    cloNIDine (CATAPRES) 0.1 MG tablet Take 1 tablet by mouth nightly 60 tablet 3    lithium (LITHOBID) 300 MG extended release tablet Take 1 tablet by mouth daily 60 tablet 3    omeprazole (PRILOSEC) 20 MG delayed release capsule Take 1 capsule by mouth daily 30 capsule 3    sertraline (ZOLOFT) 50 MG tablet Take 3 tablets by mouth daily 30 tablet 3    traZODone (DESYREL) 50 MG tablet Take 1 tablet by mouth nightly as needed for Sleep 30 tablet 0     No current facility-administered medications for this encounter. Medication reconciliation - done        Impression      1.  Moderate recurrent major depression ([facility] [address]               PLAN  Continue to follow-up with outpatient provider Dr. Sharrell Ganser  Recommended therapy  Patient may call us at anytime with questions or with concerns related to ECT  No follow-up scheduled per patient request as she is transitioning back to full-time care with her outpatient provider